# Patient Record
Sex: FEMALE | Race: WHITE | NOT HISPANIC OR LATINO | Employment: OTHER | URBAN - METROPOLITAN AREA
[De-identification: names, ages, dates, MRNs, and addresses within clinical notes are randomized per-mention and may not be internally consistent; named-entity substitution may affect disease eponyms.]

---

## 2017-03-09 ENCOUNTER — OFFICE VISIT (OUTPATIENT)
Dept: FAMILY MEDICINE | Facility: OTHER | Age: 32
End: 2017-03-09
Attending: FAMILY MEDICINE
Payer: COMMERCIAL

## 2017-03-09 VITALS
BODY MASS INDEX: 27.31 KG/M2 | HEART RATE: 76 BPM | WEIGHT: 160 LBS | SYSTOLIC BLOOD PRESSURE: 100 MMHG | DIASTOLIC BLOOD PRESSURE: 62 MMHG | HEIGHT: 64 IN | TEMPERATURE: 97.1 F | RESPIRATION RATE: 14 BRPM

## 2017-03-09 DIAGNOSIS — M62.830 SPASM OF LUMBAR PARASPINOUS MUSCLE: Primary | ICD-10-CM

## 2017-03-09 PROCEDURE — 99214 OFFICE O/P EST MOD 30 MIN: CPT | Performed by: FAMILY MEDICINE

## 2017-03-09 RX ORDER — TIZANIDINE 2 MG/1
2 TABLET ORAL 3 TIMES DAILY PRN
Qty: 30 TABLET | Refills: 0 | Status: SHIPPED | OUTPATIENT
Start: 2017-03-09 | End: 2017-04-13

## 2017-03-09 NOTE — MR AVS SNAPSHOT
"              After Visit Summary   3/9/2017    Rachel Villarreal    MRN: [id number]           Patient Information     Date Of Birth          1985        Visit Information        Provider Department      3/9/2017 2:45 PM Mary Rankin MD [facility]        Today's Diagnoses     Spasm of lumbar paraspinous muscle    -  1       Follow-ups after your visit        Who to contact     If you have questions or need follow up information about today's clinic visit or your schedule please contact [facility] directly at [phone].  Normal or non-critical lab and imaging results will be communicated to you by SeroMatchhart, letter or phone within 4 business days after the clinic has received the results. If you do not hear from us within 7 days, please contact the clinic through UpTapt or phone. If you have a critical or abnormal lab result, we will notify you by phone as soon as possible.  Submit refill requests through YesGraph or call your pharmacy and they will forward the refill request to us. Please allow 3 business days for your refill to be completed.          Additional Information About Your Visit        MyChart Information     YesGraph lets you send messages to your doctor, view your test results, renew your prescriptions, schedule appointments and more. To sign up, go to [email or web address] . Click on \"Log in\" on the left side of the screen, which will take you to the Welcome page. Then click on \"Sign up Now\" on the right side of the page.     You will be asked to enter the access code listed below, as well as some personal information. Please follow the directions to create your username and password.     Your access code is: [email or web address]  Expires: 2017  7:52 AM     Your access code will  in 90 days. If you need help or a new code, please call your [facility] or [phone].        Care EveryWhere ID     This is your Care EveryWhere ID. This could be used by " "other organizations to access your [place] medical records  [id number]        Your Vitals Were     Pulse Temperature Respirations Height BMI (Body Mass Index)       76 97.1  F (36.2  C) (Tympanic) 14 5' 3.5\" (1.613 m) 27.9 kg/m2        Blood Pressure from Last 3 Encounters:   03/09/17 100/62   09/08/16 110/80    Weight from Last 3 Encounters:   03/09/17 160 lb (72.6 kg)   09/08/16 155 lb (70.3 kg)              Today, you had the following     No orders found for display         Today's Medication Changes          These changes are accurate as of: 3/9/17 11:59 PM.  If you have any questions, ask your nurse or doctor.               Start taking these medicines.        Dose/Directions    tiZANidine 2 MG tablet   Commonly known as:  ZANAFLEX   Used for:  Spasm of lumbar paraspinous muscle   Started by:  Mary Rankin MD        Dose:  2 mg   Take 1 tablet (2 mg) by mouth 3 times daily as needed for muscle spasms   Quantity:  30 tablet   Refills:  0            Where to get your medicines      These medications were sent to [facility] [address] [address]  AT [facility] [address]  [address] [facility] [address]     Phone:  [phone]     tiZANidine 2 MG tablet                Primary Care Provider    None Specified       No primary provider on file.        Thank you!     Thank you for choosing [facility]  for your care. Our goal is always to provide you with excellent care. Hearing back from our patients is one way we can continue to improve our services. Please take a few minutes to complete the written survey that you may receive in the mail after your visit with us. Thank you!             Your Updated Medication List - Protect others around you: Learn how to safely use, store and throw away your medicines at www.disposemymeds.org.          This list is accurate as of: 3/9/17 11:59 PM.  Always use your most recent med list.                   Brand " Name Dispense Instructions for use    sertraline 50 MG tablet    ZOLOFT    90 tablet    Take 1 tablet (50 mg) by mouth daily       tiZANidine 2 MG tablet    ZANAFLEX    30 tablet    Take 1 tablet (2 mg) by mouth 3 times daily as needed for muscle spasms       valACYclovir 500 MG tablet    VALTREX     Take 500 mg by mouth daily as needed

## 2017-03-09 NOTE — NURSING NOTE
"Chief Complaint   Patient presents with     Back Pain     Patient reports low back pain x1 week.       Initial /62 (BP Location: Right arm, Patient Position: Chair, Cuff Size: Adult Regular)  Pulse 76  Temp 97.1  F (36.2  C) (Tympanic)  Resp 14  Ht 5' 3.5\" (1.613 m)  Wt 160 lb (72.6 kg)  BMI 27.9 kg/m2 Estimated body mass index is 27.9 kg/(m^2) as calculated from the following:    Height as of this encounter: 5' 3.5\" (1.613 m).    Weight as of this encounter: 160 lb (72.6 kg).  Medication Reconciliation: complete   Analilia Cobos      "

## 2017-03-10 ASSESSMENT — PATIENT HEALTH QUESTIONNAIRE - PHQ9: SUM OF ALL RESPONSES TO PHQ QUESTIONS 1-9: 0

## 2017-03-10 NOTE — PROGRESS NOTES
SUBJECTIVE:                                                    Rachel Villarreal is a 31 year old female who presents to clinic today for the following health issues:    Back Pain      Duration: about one week        Specific cause: none    Description:   Location of pain: low back left  Character of pain: gnawing, cramping and intermittently will get sharp  Pain radiation:none  New numbness or weakness in legs, not attributed to pain:  no     Intensity: moderate    History:   Pain interferes with job: No  History of back problems: no prior back problems  Any previous MRI or X-rays: None  Sees a specialist for back pain:  No    Alleviating factors:   Improved by: NSAIDs and stretch      Precipitating factors:  Worsened by: sitting on couch, then getting up    Functional and Psychosocial Screen (Terese STarT Back):      Not performed today       Accompanying Signs & Symptoms:  Risk of Fracture:  None  Risk of Cauda Equina:  None  Risk of Infection:  None  Risk of Cancer:  None  Risk of Ankylosing Spondylitis:  Onset at age <35, male, AND morning back stiffness. no                        Problem list and histories reviewed & adjusted, as indicated.  Additional history: as documented    Patient Active Problem List   Diagnosis     ACP (advance care planning)     Mild episode of recurrent major depressive disorder (H)     Advance care planning     Past Surgical History   Procedure Laterality Date     Ent surgery  2001     TONSILECTOMY     Gyn surgery  2011     invetro fertilization       Social History   Substance Use Topics     Smoking status: Never Smoker     Smokeless tobacco: Never Used     Alcohol use 0.0 oz/week     0 Standard drinks or equivalent per week      Comment: OCCASIONAL     Family History   Problem Relation Age of Onset     Hypertension Mother      Other Cancer Maternal Grandfather      DIABETES Paternal Grandfather          Current Outpatient Prescriptions   Medication Sig Dispense Refill      "tiZANidine (ZANAFLEX) 2 MG tablet Take 1 tablet (2 mg) by mouth 3 times daily as needed for muscle spasms 30 tablet 0     sertraline (ZOLOFT) 50 MG tablet Take 1 tablet (50 mg) by mouth daily 90 tablet 1     valACYclovir (VALTREX) 500 MG tablet Take 500 mg by mouth daily as needed       Allergies   Allergen Reactions     Penicillins Rash       Reviewed and updated as needed this visit by clinical staff  Tobacco  Allergies  Meds       Reviewed and updated as needed this visit by Provider         ROS:  Constitutional, HEENT, cardiovascular, pulmonary, gi and gu systems are negative, except as otherwise noted.    OBJECTIVE:                                                    /62 (BP Location: Right arm, Patient Position: Chair, Cuff Size: Adult Regular)  Pulse 76  Temp 97.1  F (36.2  C) (Tympanic)  Resp 14  Ht 5' 3.5\" (1.613 m)  Wt 160 lb (72.6 kg)  BMI 27.9 kg/m2  Body mass index is 27.9 kg/(m^2).  GENERAL: healthy, alert and no distress  BACK: no CVA tenderness, paralumbar tenderness present on the left with spasm, no tenderness along the sciatic groove area, straight leg raise negative bilaterally, reflexes 2+ bilaterally and symmetric at the patella  PSYCH: mentation appears normal, affect normal/bright    Diagnostic Test Results:  none      ASSESSMENT/PLAN:                                                      1. Spasm of lumbar paraspinous muscle  Continue NSAIDs.  Use Zanaflex at night.  Consider PT referral if no improvement noted.  - tiZANidine (ZANAFLEX) 2 MG tablet; Take 1 tablet (2 mg) by mouth 3 times daily as needed for muscle spasms  Dispense: 30 tablet; Refill: 0    Over 25 minutes are spent with the patient, over 50% of which was in education and counseling regarding current conditions and treatment/therapy options/risks/benefits/etc.      Mary Rankin MD  [facility]    "

## 2017-04-13 ENCOUNTER — OFFICE VISIT (OUTPATIENT)
Dept: FAMILY MEDICINE | Facility: OTHER | Age: 32
End: 2017-04-13
Attending: FAMILY MEDICINE
Payer: COMMERCIAL

## 2017-04-13 VITALS
HEART RATE: 80 BPM | BODY MASS INDEX: 27.49 KG/M2 | SYSTOLIC BLOOD PRESSURE: 110 MMHG | HEIGHT: 64 IN | WEIGHT: 161 LBS | RESPIRATION RATE: 14 BRPM | DIASTOLIC BLOOD PRESSURE: 70 MMHG

## 2017-04-13 DIAGNOSIS — N92.6 MISSED PERIODS: Primary | ICD-10-CM

## 2017-04-13 DIAGNOSIS — Z32.01 PREGNANCY TEST POSITIVE: ICD-10-CM

## 2017-04-13 LAB — HCG UR QL: POSITIVE

## 2017-04-13 PROCEDURE — 99213 OFFICE O/P EST LOW 20 MIN: CPT | Performed by: FAMILY MEDICINE

## 2017-04-13 PROCEDURE — 81025 URINE PREGNANCY TEST: CPT | Performed by: FAMILY MEDICINE

## 2017-04-13 NOTE — NURSING NOTE
"Chief Complaint   Patient presents with     Confirmation Of Pregnancy     LMP 2-26-17       Initial /70 (BP Location: Left arm, Patient Position: Chair, Cuff Size: Adult Regular)  Pulse 80  Resp 14  Ht 5' 3.5\" (1.613 m)  Wt 161 lb (73 kg)  LMP 02/26/2017  BMI 28.07 kg/m2 Estimated body mass index is 28.07 kg/(m^2) as calculated from the following:    Height as of this encounter: 5' 3.5\" (1.613 m).    Weight as of this encounter: 161 lb (73 kg).  Medication Reconciliation: complete     Skylar Rocha      "

## 2017-04-13 NOTE — MR AVS SNAPSHOT
"              After Visit Summary   4/13/2017    Rachel Villarreal    MRN: [id number]           Patient Information     Date Of Birth          1985        Visit Information        Provider Department      4/13/2017 2:30 PM Mary Rankin MD [facility]        Today's Diagnoses     Missed periods    -  1    Pregnancy test positive           Follow-ups after your visit        Follow-up notes from your care team     Return in about 4 weeks (around 5/11/2017).      Your next 10 appointments already scheduled     May 11, 2017  2:00 PM CDT   (Arrive by 1:45 PM)   New Prenatal with Mary Rankin MD   [facility] ([facility] )    [address] [facility] [address]   [phone]              Who to contact     If you have questions or need follow up information about today's clinic visit or your schedule please contact [facility] directly at [phone].  Normal or non-critical lab and imaging results will be communicated to you by MyChart, letter or phone within 4 business days after the clinic has received the results. If you do not hear from us within 7 days, please contact the clinic through Ultimate Football Networkhart or phone. If you have a critical or abnormal lab result, we will notify you by phone as soon as possible.  Submit refill requests through Split or call your pharmacy and they will forward the refill request to us. Please allow 3 business days for your refill to be completed.          Additional Information About Your Visit        Ultimate Football NetworkharCareShare Information     Split lets you send messages to your doctor, view your test results, renew your prescriptions, schedule appointments and more. To sign up, go to [email or web address] . Click on \"Log in\" on the left side of the screen, which will take you to the Welcome page. Then click on \"Sign up Now\" on the right side of the page.     You will be asked to enter the access code listed below, as well as " "some personal information. Please follow the directions to create your username and password.     Your access code is: [email or web address]  Expires: 2017  8:52 AM     Your access code will  in 90 days. If you need help or a new code, please call your [place] clinic or [phone].        Care EveryWhere ID     This is your Care EveryWhere ID. This could be used by other organizations to access your [place] medical records  [id number]        Your Vitals Were     Pulse Respirations Height Last Period BMI (Body Mass Index)       80 14 5' 3.5\" (1.613 m) 2017 28.07 kg/m2        Blood Pressure from Last 3 Encounters:   17 110/70   17 100/62   16 110/80    Weight from Last 3 Encounters:   17 161 lb (73 kg)   17 160 lb (72.6 kg)   16 155 lb (70.3 kg)              We Performed the Following     HCG qualitative urine          Today's Medication Changes          These changes are accurate as of: 17 11:59 PM.  If you have any questions, ask your nurse or doctor.               Stop taking these medicines if you haven't already. Please contact your care team if you have questions.     tiZANidine 2 MG tablet   Commonly known as:  ZANAFLEX   Stopped by:  Mary Rankin MD                    Primary Care Provider Office Phone # Fax #    Mary Rankin [phone]       [address] [address]        Thank you!     Thank you for choosing [facility]  for your care. Our goal is always to provide you with excellent care. Hearing back from our patients is one way we can continue to improve our services. Please take a few minutes to complete the written survey that you may receive in the mail after your visit with us. Thank you!             Your Updated Medication List - Protect others around you: Learn how to safely use, store and throw away your medicines at www.disposemymeds.org.          This " list is accurate as of: 4/13/17 11:59 PM.  Always use your most recent med list.                   Brand Name Dispense Instructions for use    PRENATAL VITAMINS PO          sertraline 50 MG tablet    ZOLOFT    90 tablet    Take 1 tablet (50 mg) by mouth daily       valACYclovir 500 MG tablet    VALTREX     Take 500 mg by mouth daily as needed

## 2017-04-14 NOTE — PROGRESS NOTES
Subjective:  Rachel Villarreal is a 31 year old female   Chief Complaint   Patient presents with     Confirmation Of Pregnancy     LMP 2-26-17       Patient presents today for confirmation of pregnancy.  LMP was 2/26, pregnancy was not being prevented.  This would be patient's first pregnancy.    Medical, surgical, social, and family histories, medications and allergies reviewed and updated.    Review Of Systems  10 point ROS neg other than the symptoms noted above in the HPI.      Objective:  B/P: 110/70, T: Data Unavailable, P: 80, R: 14  Constitutional: healthy, alert and no distress  Psychiatric: mentation appears normal and affect normal/bright    Results for orders placed or performed in visit on 04/13/17   HCG qualitative urine   Result Value Ref Range    HCG Qual Urine Positive (A) NEG         ASSESSMENT / PLAN:  (N92.6) Missed periods  (primary encounter diagnosis)  Comment:   Plan: HCG qualitative urine            (Z32.01) Pregnancy test positive  Comment:   Plan: With LMP of 2/26, EDC would be 12/2/17, and current gestation would be 6 weeks 3 days.  First OB visit in 4-5 weeks.  Signs of miscarriage discussed.  Follow-up sooner as needed.        Mary Sim MD

## 2017-04-18 ENCOUNTER — TELEPHONE (OUTPATIENT)
Dept: FAMILY MEDICINE | Facility: OTHER | Age: 32
End: 2017-04-18

## 2017-04-18 DIAGNOSIS — F33.0 MILD EPISODE OF RECURRENT MAJOR DEPRESSIVE DISORDER (H): Primary | ICD-10-CM

## 2017-04-18 NOTE — TELEPHONE ENCOUNTER
Called her back and she has been on sertraline for abut 3 years and has a very low sex drive and feels this is important and does not want to feel this way for her entire pregnancy

## 2017-04-18 NOTE — TELEPHONE ENCOUNTER
SEEN recently to confirm pregnancy and calling to request an antidepressant change from sertraline to wellbutrin if that is the one you discussed previously

## 2017-04-19 RX ORDER — BUPROPION HYDROCHLORIDE 150 MG/1
150 TABLET ORAL EVERY MORNING
Qty: 30 TABLET | Refills: 1 | Status: SHIPPED | OUTPATIENT
Start: 2017-04-19 | End: 2017-07-10

## 2017-04-19 NOTE — TELEPHONE ENCOUNTER
Start Wellbutrin 150 mg daily, sent to pharmacy.  Should wean off Zoloft, take 1/2 tablet daily for one week then stop.  Can take both medication together, wean off Zoloft while starting Wellbutrin.

## 2017-05-11 ENCOUNTER — PRENATAL OFFICE VISIT (OUTPATIENT)
Dept: FAMILY MEDICINE | Facility: OTHER | Age: 32
End: 2017-05-11
Attending: FAMILY MEDICINE
Payer: COMMERCIAL

## 2017-05-11 VITALS
BODY MASS INDEX: 28.53 KG/M2 | WEIGHT: 161 LBS | RESPIRATION RATE: 14 BRPM | HEIGHT: 63 IN | DIASTOLIC BLOOD PRESSURE: 64 MMHG | HEART RATE: 88 BPM | SYSTOLIC BLOOD PRESSURE: 110 MMHG | TEMPERATURE: 98.2 F

## 2017-05-11 DIAGNOSIS — Z34.81 ENCOUNTER FOR SUPERVISION OF OTHER NORMAL PREGNANCY, FIRST TRIMESTER: Primary | ICD-10-CM

## 2017-05-11 DIAGNOSIS — A60.00 HERPES SIMPLEX INFECTION OF GENITOURINARY SYSTEM: ICD-10-CM

## 2017-05-11 PROBLEM — Z34.80 SUPERVISION OF OTHER NORMAL PREGNANCY, ANTEPARTUM: Status: ACTIVE | Noted: 2017-05-11

## 2017-05-11 LAB
ALBUMIN UR-MCNC: NEGATIVE MG/DL
AMORPH CRY #/AREA URNS HPF: ABNORMAL /HPF
APPEARANCE UR: CLEAR
BACTERIA #/AREA URNS HPF: ABNORMAL /HPF
BILIRUB UR QL STRIP: NEGATIVE
COLOR UR AUTO: YELLOW
ERYTHROCYTE [DISTWIDTH] IN BLOOD BY AUTOMATED COUNT: 12.4 % (ref 10–15)
GLUCOSE UR STRIP-MCNC: NEGATIVE MG/DL
HCT VFR BLD AUTO: 33.6 % (ref 35–47)
HGB BLD-MCNC: 11.6 G/DL (ref 11.7–15.7)
HGB UR QL STRIP: ABNORMAL
KETONES UR STRIP-MCNC: NEGATIVE MG/DL
LEUKOCYTE ESTERASE UR QL STRIP: ABNORMAL
MCH RBC QN AUTO: 30 PG (ref 26.5–33)
MCHC RBC AUTO-ENTMCNC: 34.5 G/DL (ref 31.5–36.5)
MCV RBC AUTO: 87 FL (ref 78–100)
NITRATE UR QL: NEGATIVE
NON-SQ EPI CELLS #/AREA URNS LPF: ABNORMAL /LPF
PH UR STRIP: 7 PH (ref 5–7)
PLATELET # BLD AUTO: 229 10E9/L (ref 150–450)
RBC # BLD AUTO: 3.87 10E12/L (ref 3.8–5.2)
RBC #/AREA URNS AUTO: ABNORMAL /HPF (ref 0–2)
SP GR UR STRIP: 1.02 (ref 1–1.03)
URN SPEC COLLECT METH UR: ABNORMAL
UROBILINOGEN UR STRIP-ACNC: 0.2 EU/DL (ref 0.2–1)
WBC # BLD AUTO: 9.1 10E9/L (ref 4–11)
WBC #/AREA URNS AUTO: ABNORMAL /HPF (ref 0–2)

## 2017-05-11 PROCEDURE — 81001 URINALYSIS AUTO W/SCOPE: CPT | Performed by: FAMILY MEDICINE

## 2017-05-11 PROCEDURE — 86900 BLOOD TYPING SEROLOGIC ABO: CPT | Performed by: FAMILY MEDICINE

## 2017-05-11 PROCEDURE — 86787 VARICELLA-ZOSTER ANTIBODY: CPT | Mod: 90 | Performed by: FAMILY MEDICINE

## 2017-05-11 PROCEDURE — 86780 TREPONEMA PALLIDUM: CPT | Mod: 90 | Performed by: FAMILY MEDICINE

## 2017-05-11 PROCEDURE — 99207 ZZC FIRST OB VISIT: CPT | Performed by: FAMILY MEDICINE

## 2017-05-11 PROCEDURE — 86762 RUBELLA ANTIBODY: CPT | Mod: 90 | Performed by: FAMILY MEDICINE

## 2017-05-11 PROCEDURE — 85027 COMPLETE CBC AUTOMATED: CPT | Performed by: FAMILY MEDICINE

## 2017-05-11 PROCEDURE — 86901 BLOOD TYPING SEROLOGIC RH(D): CPT | Performed by: FAMILY MEDICINE

## 2017-05-11 PROCEDURE — 86850 RBC ANTIBODY SCREEN: CPT | Performed by: FAMILY MEDICINE

## 2017-05-11 PROCEDURE — 36415 COLL VENOUS BLD VENIPUNCTURE: CPT | Performed by: FAMILY MEDICINE

## 2017-05-11 PROCEDURE — 87389 HIV-1 AG W/HIV-1&-2 AB AG IA: CPT | Mod: 90 | Performed by: FAMILY MEDICINE

## 2017-05-11 PROCEDURE — 87591 N.GONORRHOEAE DNA AMP PROB: CPT | Mod: 90 | Performed by: FAMILY MEDICINE

## 2017-05-11 PROCEDURE — 87491 CHLMYD TRACH DNA AMP PROBE: CPT | Mod: 90 | Performed by: FAMILY MEDICINE

## 2017-05-11 PROCEDURE — 87340 HEPATITIS B SURFACE AG IA: CPT | Mod: 90 | Performed by: FAMILY MEDICINE

## 2017-05-11 PROCEDURE — 99000 SPECIMEN HANDLING OFFICE-LAB: CPT | Performed by: FAMILY MEDICINE

## 2017-05-11 NOTE — NURSING NOTE
"Chief Complaint   Patient presents with     Prenatal Care     Patient is here for a new OB visit, reports nausea. Patient is 10 weeks, 4 days.       Initial /64 (BP Location: Left arm, Patient Position: Chair, Cuff Size: Adult Regular)  Pulse 88  Temp 98.2  F (36.8  C) (Tympanic)  Resp 14  Ht 5' 3\" (1.6 m)  Wt 161 lb (73 kg)  LMP 02/26/2017  Breastfeeding? No  BMI 28.52 kg/m2 Estimated body mass index is 28.52 kg/(m^2) as calculated from the following:    Height as of this encounter: 5' 3\" (1.6 m).    Weight as of this encounter: 161 lb (73 kg).  Medication Reconciliation: complete   Analilia Cobos      "

## 2017-05-11 NOTE — MR AVS SNAPSHOT
"              After Visit Summary   5/11/2017    Rachel Villarreal    MRN: [id number]           Patient Information     Date Of Birth          1985        Visit Information        Provider Department      5/11/2017 2:00 PM Mayr Rankin MD [facility]        Today's Diagnoses     Encounter for supervision of other normal pregnancy, first trimester    -  1    Herpes simplex infection of genitourinary system           Follow-ups after your visit        Follow-up notes from your care team     Return in about 4 weeks (around 6/8/2017).      Your next 10 appointments already scheduled     Jun 08, 2017  3:30 PM CDT   (Arrive by 3:15 PM)   ESTABLISHED PRENATAL with Mary Rankin MD   [facility] ([facility] )    [address]  [facility] [address]   [phone]              Who to contact     If you have questions or need follow up information about today's clinic visit or your schedule please contact [facility] directly at [phone].  Normal or non-critical lab and imaging results will be communicated to you by Mobile Possehart, letter or phone within 4 business days after the clinic has received the results. If you do not hear from us within 7 days, please contact the clinic through Contextorst or phone. If you have a critical or abnormal lab result, we will notify you by phone as soon as possible.  Submit refill requests through Forgotten [place] or call your pharmacy and they will forward the refill request to us. Please allow 3 business days for your refill to be completed.          Additional Information About Your Visit        Mobile Possehart Information     Forgotten [place] lets you send messages to your doctor, view your test results, renew your prescriptions, schedule appointments and more. To sign up, go to [email or web address] . Click on \"Log in\" on the left side of the screen, which will take you to the Welcome page. Then click on \"Sign up Now\" on the right side " "of the page.     You will be asked to enter the access code listed below, as well as some personal information. Please follow the directions to create your username and password.     Your access code is: [email or web address]  Expires: 2017  8:52 AM     Your access code will  in 90 days. If you need help or a new code, please call your [facility] or [phone].        Care EveryWhere ID     This is your Care EveryWhere ID. This could be used by other organizations to access your [place] medical records  [id number]        Your Vitals Were     Pulse Temperature Respirations Height Last Period Breastfeeding?    88 98.2  F (36.8  C) (Tympanic) 14 5' 3\" (1.6 m) 2017 No    BMI (Body Mass Index)                   28.52 kg/m2            Blood Pressure from Last 3 Encounters:   17 110/64   17 110/70   17 100/62    Weight from Last 3 Encounters:   17 161 lb (73 kg)   17 161 lb (73 kg)   17 160 lb (72.6 kg)              We Performed the Following     ABO/Rh type and screen     Anti Treponema     CBC with platelets     CHLAMYDIA TRACHOMATIS PCR     Hepatitis B surface antigen     HIV Antigen Antibody Combo     NEISSERIA GONORRHOEA PCR     Rubella Antibody IgG Quantitative     UA with Microscopic     Varicella Zoster Virus Antibody IgG        Primary Care Provider Office Phone # Fax #    Mary Rankin [phone]       [facility] [address] [place] [address]        Thank you!     Thank you for choosing [facility]  for your care. Our goal is always to provide you with excellent care. Hearing back from our patients is one way we can continue to improve our services. Please take a few minutes to complete the written survey that you may receive in the mail after your visit with us. Thank you!             Your Updated Medication List - Protect others around you: Learn how to safely use, store and throw away your " medicines at www.disposemymeds.org.          This list is accurate as of: 5/11/17  2:53 PM.  Always use your most recent med list.                   Brand Name Dispense Instructions for use    buPROPion 150 MG 24 hr tablet    WELLBUTRIN XL    30 tablet    Take 1 tablet (150 mg) by mouth every morning       PRENATAL VITAMINS PO      Reported on 5/11/2017       valACYclovir 500 MG tablet    VALTREX     Take 500 mg by mouth daily as needed Reported on 5/11/2017

## 2017-05-11 NOTE — PROGRESS NOTES
"SUBJECTIVE: Rachel Villarreal is an 31 year old female here for initial OB visit.    Past Medical History of Father of Baby: No significant medical history    Review of Systems:   Constitutional, HEENT, cardiovascular, pulmonary, gi and gu systems are negative, except as otherwise noted.     History Since Last Menstrual Period: mild nausea    EXAM: Blood pressure 110/64, pulse 88, temperature 98.2  F (36.8  C), temperature source Tympanic, resp. rate 14, height 5' 3\" (1.6 m), weight 161 lb (73 kg), last menstrual period 02/26/2017, not currently breastfeeding.  GENERAL APPEARANCE: healthy, alert and no distress  EYES: EOMI,  PERRL  RESP: lungs clear to auscultation - no rales, rhonchi or wheezes  CV: regular rates and rhythm, normal S1 S2, no S3 or S4 and no murmur, click or rub -  ABDOMEN: fundus just above symphysis, FHTs 160s  PSYCH: mentation appears normal and affect normal/bright    Pelvix exam:  Deferred, pelvis proven    ASSESSMENT/ PLAN:  Follow up in 4 weeks.  NFT testing discussed, she and  will discuss  Normal exercise.  Normal sexual activity.  Prenatal vitamins.  Anticipated weight gain.  Herpes prophylaxis starting at 34 weeks discussed.    Mary Rankin MD      "

## 2017-05-12 LAB
ABO + RH BLD: NORMAL
ABO + RH BLD: NORMAL
BLD GP AB SCN SERPL QL: NORMAL
BLOOD BANK CMNT PATIENT-IMP: NORMAL
SPECIMEN EXP DATE BLD: NORMAL

## 2017-05-12 ASSESSMENT — PATIENT HEALTH QUESTIONNAIRE - PHQ9: SUM OF ALL RESPONSES TO PHQ QUESTIONS 1-9: 4

## 2017-05-13 LAB — T PALLIDUM IGG+IGM SER QL: NEGATIVE

## 2017-05-14 LAB
C TRACH DNA SPEC QL NAA+PROBE: NORMAL
N GONORRHOEA DNA SPEC QL NAA+PROBE: NORMAL
SPECIMEN SOURCE: NORMAL
SPECIMEN SOURCE: NORMAL

## 2017-05-15 LAB
HBV SURFACE AG SERPL QL IA: NONREACTIVE
HIV 1+2 AB+HIV1 P24 AG SERPL QL IA: NORMAL
RUBV IGG SERPL IA-ACNC: 16 IU/ML
VZV IGG SER QL IA: 1.4 AI (ref 0–0.8)

## 2017-06-08 ENCOUNTER — APPOINTMENT (OUTPATIENT)
Dept: LAB | Facility: OTHER | Age: 32
End: 2017-06-08
Attending: FAMILY MEDICINE
Payer: COMMERCIAL

## 2017-06-08 ENCOUNTER — PRENATAL OFFICE VISIT (OUTPATIENT)
Dept: FAMILY MEDICINE | Facility: OTHER | Age: 32
End: 2017-06-08
Attending: FAMILY MEDICINE
Payer: COMMERCIAL

## 2017-06-08 VITALS
HEART RATE: 80 BPM | RESPIRATION RATE: 14 BRPM | SYSTOLIC BLOOD PRESSURE: 92 MMHG | WEIGHT: 161 LBS | DIASTOLIC BLOOD PRESSURE: 62 MMHG | HEIGHT: 63 IN | BODY MASS INDEX: 28.53 KG/M2

## 2017-06-08 DIAGNOSIS — Z34.82 ENCOUNTER FOR SUPERVISION OF OTHER NORMAL PREGNANCY IN SECOND TRIMESTER: Primary | ICD-10-CM

## 2017-06-08 PROCEDURE — 99207 ZZC PRENATAL VISIT: CPT | Performed by: FAMILY MEDICINE

## 2017-06-08 NOTE — MR AVS SNAPSHOT
After Visit Summary   6/8/2017    Rachel Villarreal    MRN: [id number]           Patient Information     Date Of Birth          1985        Visit Information        Provider Department      6/8/2017 3:30 PM Mary Rankin MD [facility]        Today's Diagnoses     Encounter for supervision of other normal pregnancy in second trimester    -  1       Follow-ups after your visit        Follow-up notes from your care team     Return in about 4 weeks (around 7/6/2017).      Your next 10 appointments already scheduled     Jul 10, 2017  3:30 PM CDT   LAB with [facility] ([facility] )    [address]  [facility] [address]   [phone]            Jul 10, 2017  3:45 PM CDT   (Arrive by 3:30 PM)   ESTABLISHED PRENATAL with Mary Rankin MD   [facility] ([facility] )    [address] [facility] [address]   [phone]              Future tests that were ordered for you today     Open Standing Orders        Priority Remaining Interval Expires Ordered    UA without Microscopic Routine 20/20 6/9/2018 6/9/2017            Who to contact     If you have questions or need follow up information about today's clinic visit or your schedule please contact [facility] directly at [phone].  Normal or non-critical lab and imaging results will be communicated to you by MyChart, letter or phone within 4 business days after the clinic has received the results. If you do not hear from us within 7 days, please contact the clinic through MyChart or phone. If you have a critical or abnormal lab result, we will notify you by phone as soon as possible.  Submit refill requests through [employer] or call your pharmacy and they will forward the refill request to us. Please allow 3 business days for your refill to be completed.          Additional Information About Your Visit        MyChart Information      "[employer] lets you send messages to your doctor, view your test results, renew your prescriptions, schedule appointments and more. To sign up, go to [email or web address] . Click on \"Log in\" on the left side of the screen, which will take you to the Welcome page. Then click on \"Sign up Now\" on the right side of the page.     You will be asked to enter the access code listed below, as well as some personal information. Please follow the directions to create your username and password.     Your access code is: [email or web address]  Expires: 2017  5:43 PM     Your access code will  in 90 days. If you need help or a new code, please call your [place] clinic or [phone].        Care EveryWhere ID     This is your Care EveryWhere ID. This could be used by other organizations to access your [place] medical records  [id number]        Your Vitals Were     Pulse Respirations Height Last Period BMI (Body Mass Index)       80 14 5' 3\" (1.6 m) 2017 28.52 kg/m2        Blood Pressure from Last 3 Encounters:   17 92/62   17 110/64   17 110/70    Weight from Last 3 Encounters:   17 161 lb (73 kg)   17 161 lb (73 kg)   17 161 lb (73 kg)               Primary Care Provider Office Phone # Fax #    Marykevin Rankin [phone]       [address] [address]        Thank you!     Thank you for choosing [facility]  for your care. Our goal is always to provide you with excellent care. Hearing back from our patients is one way we can continue to improve our services. Please take a few minutes to complete the written survey that you may receive in the mail after your visit with us. Thank you!             Your Updated Medication List - Protect others around you: Learn how to safely use, store and throw away your medicines at www.disposemymeds.org.          This list is accurate as of: 17 11:59 PM.  Always use " your most recent med list.                   Brand Name Dispense Instructions for use    buPROPion 150 MG 24 hr tablet    WELLBUTRIN XL    30 tablet    Take 1 tablet (150 mg) by mouth every morning       PRENATAL VITAMINS PO      Reported on 5/11/2017       valACYclovir 500 MG tablet    VALTREX     Take 500 mg by mouth daily as needed Reported on 5/11/2017

## 2017-06-08 NOTE — NURSING NOTE
"Chief Complaint   Patient presents with     Prenatal Care     14 weeks,       Initial BP 92/62 (BP Location: Left arm, Patient Position: Chair, Cuff Size: Adult Regular)  Pulse 80  Resp 14  Ht 5' 3\" (1.6 m)  Wt 161 lb (73 kg)  LMP 02/26/2017  BMI 28.52 kg/m2 Estimated body mass index is 28.52 kg/(m^2) as calculated from the following:    Height as of this encounter: 5' 3\" (1.6 m).    Weight as of this encounter: 161 lb (73 kg).  Medication Reconciliation: complete       Skylar Rocha      "

## 2017-06-09 NOTE — PROGRESS NOTES
"S:  Doing well, no cramping or contractions, no bleeding or spotting, good fetal movement    O:  BP 92/62 (BP Location: Left arm, Patient Position: Chair, Cuff Size: Adult Regular)  Pulse 80  Resp 14  Ht 5' 3\" (1.6 m)  Wt 161 lb (73 kg)  LMP 02/26/2017  BMI 28.52 kg/m2  Abd: fundus at 14  FHTs 150s    ASSESSMENT / PLAN:  (Z34.82) Encounter for supervision of other normal pregnancy in second trimester  (primary encounter diagnosis)  Comment:   Plan: Quad screen discussed, patient will consider but likely will decline.  Follow-up in four weeks, sooner as needed.        Mary Rankin MD      "

## 2017-06-29 DIAGNOSIS — F33.0 MILD EPISODE OF RECURRENT MAJOR DEPRESSIVE DISORDER (H): ICD-10-CM

## 2017-06-30 NOTE — TELEPHONE ENCOUNTER
zoloft     Last Written Prescription Date: 10/27/2016- medication dc off med list   Last Fill Quantity: 90, # refills: 0  Last Office Visit with G primary care provider:  4/13/2017   Next 5 appointments (look out 90 days)     Jul 10, 2017  3:45 PM CDT   (Arrive by 3:30 PM)   ESTABLISHED PRENATAL with Mary Rankin MD   [facility] ([facility] )    [address]  [place] [address]   [phone]                   Last PHQ-9 score on record=   PHQ-9 SCORE 5/11/2017   Total Score 4

## 2017-06-30 NOTE — TELEPHONE ENCOUNTER
Zoloft not on current medication list.  Discontinued 4/19/17 with reason none.  Please see notes below.  Medication pended.  Thank you.

## 2017-07-10 ENCOUNTER — PRENATAL OFFICE VISIT (OUTPATIENT)
Dept: FAMILY MEDICINE | Facility: OTHER | Age: 32
End: 2017-07-10
Attending: FAMILY MEDICINE
Payer: COMMERCIAL

## 2017-07-10 VITALS — BODY MASS INDEX: 28.52 KG/M2 | WEIGHT: 161 LBS | SYSTOLIC BLOOD PRESSURE: 100 MMHG | DIASTOLIC BLOOD PRESSURE: 64 MMHG

## 2017-07-10 DIAGNOSIS — Z34.82 ENCOUNTER FOR SUPERVISION OF OTHER NORMAL PREGNANCY IN SECOND TRIMESTER: ICD-10-CM

## 2017-07-10 LAB
ALBUMIN UR-MCNC: NEGATIVE MG/DL
APPEARANCE UR: CLEAR
BILIRUB UR QL STRIP: NEGATIVE
COLOR UR AUTO: YELLOW
GLUCOSE UR STRIP-MCNC: NEGATIVE MG/DL
HGB UR QL STRIP: ABNORMAL
KETONES UR STRIP-MCNC: NEGATIVE MG/DL
LEUKOCYTE ESTERASE UR QL STRIP: NEGATIVE
NITRATE UR QL: NEGATIVE
PH UR STRIP: 6 PH (ref 5–7)
SP GR UR STRIP: 1.02 (ref 1–1.03)
URN SPEC COLLECT METH UR: ABNORMAL
UROBILINOGEN UR STRIP-ACNC: 0.2 EU/DL (ref 0.2–1)

## 2017-07-10 PROCEDURE — 99207 ZZC PRENATAL VISIT: CPT | Performed by: FAMILY MEDICINE

## 2017-07-10 ASSESSMENT — ANXIETY QUESTIONNAIRES
5. BEING SO RESTLESS THAT IT IS HARD TO SIT STILL: NOT AT ALL
GAD7 TOTAL SCORE: 0
2. NOT BEING ABLE TO STOP OR CONTROL WORRYING: NOT AT ALL
1. FEELING NERVOUS, ANXIOUS, OR ON EDGE: NOT AT ALL
7. FEELING AFRAID AS IF SOMETHING AWFUL MIGHT HAPPEN: NOT AT ALL
3. WORRYING TOO MUCH ABOUT DIFFERENT THINGS: NOT AT ALL
4. TROUBLE RELAXING: NOT AT ALL
6. BECOMING EASILY ANNOYED OR IRRITABLE: NOT AT ALL

## 2017-07-10 NOTE — MR AVS SNAPSHOT
After Visit Summary   7/10/2017    Rachel Villarreal    MRN: [id number]           Patient Information     Date Of Birth          1985        Visit Information        Provider Department      7/10/2017 3:45 PM Mary Rankin MD [facility]        Today's Diagnoses     Encounter for supervision of other normal pregnancy in second trimester           Follow-ups after your visit        Follow-up notes from your care team     Return in about 4 weeks (around 8/7/2017).      Your next 10 appointments already scheduled     Jul 17, 2017 12:30 PM CDT   Radiology with [facility]   [facility] ([facility] )    [address] [place] [address]   [phone]            Aug 11, 2017  2:00 PM CDT   LAB with [facility]   [facility] ([facility] )    [address] [place] [address]   [phone]            Aug 11, 2017  2:15 PM CDT   (Arrive by 2:00 PM)   ESTABLISHED PRENATAL with Mary Rankin MD   [facility] ([facility] )    [address]  [place] [address]   [phone]              Who to contact     If you have questions or need follow up information about today's clinic visit or your schedule please contact [facility] directly at [phone].  Normal or non-critical lab and imaging results will be communicated to you by MyChart, letter or phone within 4 business days after the clinic has received the results. If you do not hear from us within 7 days, please contact the clinic through MyChart or phone. If you have a critical or abnormal lab result, we will notify you by phone as soon as possible.  Submit refill requests through [employer] or call your pharmacy and they will forward the refill request to us. Please allow 3 business days for your refill to be completed.          Additional Information About Your Visit        MyChart Information   "   [employer] lets you send messages to your doctor, view your test results, renew your prescriptions, schedule appointments and more. To sign up, go to [email or web address] . Click on \"Log in\" on the left side of the screen, which will take you to the Welcome page. Then click on \"Sign up Now\" on the right side of the page.     You will be asked to enter the access code listed below, as well as some personal information. Please follow the directions to create your username and password.     Your access code is: [email or web address]  Expires: 2017  5:43 PM     Your access code will  in 90 days. If you need help or a new code, please call your [place] clinic or [phone].        Care EveryWhere ID     This is your Care EveryWhere ID. This could be used by other organizations to access your [place] medical records  [id number]        Your Vitals Were     Last Period BMI (Body Mass Index)                2017 28.52 kg/m2           Blood Pressure from Last 3 Encounters:   07/10/17 100/64   17 92/62   17 110/64    Weight from Last 3 Encounters:   07/10/17 161 lb (73 kg)   17 161 lb (73 kg)   17 161 lb (73 kg)                 Today's Medication Changes          These changes are accurate as of: 7/10/17 11:59 PM.  If you have any questions, ask your nurse or doctor.               These medicines have changed or have updated prescriptions.        Dose/Directions    sertraline 50 MG tablet   Commonly known as:  ZOLOFT   This may have changed:  See the new instructions.   Used for:  Mild episode of recurrent major depressive disorder (H)        TAKE 1 TABLET BY MOUTH EVERY DAY   Quantity:  90 tablet   Refills:  0         Stop taking these medicines if you haven't already. Please contact your care team if you have questions.     buPROPion 150 MG 24 hr tablet   Commonly known as:  WELLBUTRIN XL   Stopped by:  Mary Rankin MD                    Primary Care Provider Office Phone # Fax " #    Mary Rankin [phone]       [facility] [address] [facility] [address]        Equal Access to Services     FRANKIE HAGAN : Nabeel Santiago, avi mo, kayleneyashira kachancirilo marin, corrine mortensen haymaisha mcmahoncorazonbakari katz. So [facility] [phone].    ATENCIÓN: Si habla español, tiene a davis disposición servicios gratuitos de asistencia lingüística. Llame al [phone].    We comply with applicable federal civil rights laws and [place] laws. We do not discriminate on the basis of race, color, national origin, age, disability sex, sexual orientation or gender identity.            Thank you!     Thank you for choosing [facility]  for your care. Our goal is always to provide you with excellent care. Hearing back from our patients is one way we can continue to improve our services. Please take a few minutes to complete the written survey that you may receive in the mail after your visit with us. Thank you!             Your Updated Medication List - Protect others around you: Learn how to safely use, store and throw away your medicines at www.disposemymeds.org.          This list is accurate as of: 7/10/17 11:59 PM.  Always use your most recent med list.                   Brand Name Dispense Instructions for use Diagnosis    PRENATAL VITAMINS PO      Reported on 5/11/2017        sertraline 50 MG tablet    ZOLOFT    90 tablet    TAKE 1 TABLET BY MOUTH EVERY DAY    Mild episode of recurrent major depressive disorder (H)       valACYclovir 500 MG tablet    VALTREX     Take 500 mg by mouth daily as needed Reported on 5/11/2017

## 2017-07-10 NOTE — NURSING NOTE
"Chief Complaint   Patient presents with     Prenatal Care     19 weeks        Initial /64 (BP Location: Right arm, Patient Position: Sitting, Cuff Size: Adult Regular)  Wt 161 lb (73 kg)  LMP 02/26/2017  BMI 28.52 kg/m2 Estimated body mass index is 28.52 kg/(m^2) as calculated from the following:    Height as of 6/8/17: 5' 3\" (1.6 m).    Weight as of this encounter: 161 lb (73 kg).  Medication Reconciliation: anne Hutson      "

## 2017-07-11 ASSESSMENT — ANXIETY QUESTIONNAIRES: GAD7 TOTAL SCORE: 0

## 2017-07-11 ASSESSMENT — PATIENT HEALTH QUESTIONNAIRE - PHQ9: SUM OF ALL RESPONSES TO PHQ QUESTIONS 1-9: 0

## 2017-07-13 NOTE — PROGRESS NOTES
S:  Doing well, no cramping or contractions, no bleeding or spotting, good fetal movement    O:  /64 (BP Location: Right arm, Patient Position: Sitting, Cuff Size: Adult Regular)  Wt 161 lb (73 kg)  LMP 02/26/2017  BMI 28.52 kg/m2  Abd: fundus at 19 cm  FHTs 140s    ASSESSMENT / PLAN:  (Z34.82) Encounter for supervision of other normal pregnancy in second trimester  Comment:   Plan: US OB > 14 Weeks Complete Single        OB ultrasound ordered.  Follow-up in four weeks, 1 hr GTT at that time.        Mary Rankin MD

## 2017-07-17 ENCOUNTER — HOSPITAL ENCOUNTER (OUTPATIENT)
Dept: ULTRASOUND IMAGING | Facility: HOSPITAL | Age: 32
Discharge: HOME | End: 2017-07-17
Attending: FAMILY MEDICINE | Admitting: FAMILY MEDICINE
Payer: COMMERCIAL

## 2017-07-17 PROCEDURE — 76805 OB US >/= 14 WKS SNGL FETUS: CPT | Mod: TC

## 2017-08-09 ENCOUNTER — PRENATAL OFFICE VISIT (OUTPATIENT)
Dept: FAMILY MEDICINE | Facility: OTHER | Age: 32
End: 2017-08-09
Attending: FAMILY MEDICINE
Payer: COMMERCIAL

## 2017-08-09 VITALS — DIASTOLIC BLOOD PRESSURE: 64 MMHG | SYSTOLIC BLOOD PRESSURE: 100 MMHG | BODY MASS INDEX: 29.76 KG/M2 | WEIGHT: 168 LBS

## 2017-08-09 DIAGNOSIS — Z34.82 ENCOUNTER FOR SUPERVISION OF OTHER NORMAL PREGNANCY IN SECOND TRIMESTER: ICD-10-CM

## 2017-08-09 LAB
ALBUMIN UR-MCNC: NEGATIVE MG/DL
APPEARANCE UR: CLEAR
BILIRUB UR QL STRIP: NEGATIVE
COLOR UR AUTO: YELLOW
GLUCOSE UR STRIP-MCNC: NEGATIVE MG/DL
HGB BLD-MCNC: 11.4 G/DL (ref 11.7–15.7)
HGB UR QL STRIP: ABNORMAL
KETONES UR STRIP-MCNC: NEGATIVE MG/DL
LEUKOCYTE ESTERASE UR QL STRIP: NEGATIVE
NITRATE UR QL: NEGATIVE
PH UR STRIP: 7 PH (ref 5–7)
SP GR UR STRIP: 1.02 (ref 1–1.03)
URN SPEC COLLECT METH UR: ABNORMAL
UROBILINOGEN UR STRIP-ACNC: 0.2 EU/DL (ref 0.2–1)

## 2017-08-09 PROCEDURE — 99207 ZZC PRENATAL VISIT: CPT | Performed by: FAMILY MEDICINE

## 2017-08-09 PROCEDURE — 36415 COLL VENOUS BLD VENIPUNCTURE: CPT | Performed by: FAMILY MEDICINE

## 2017-08-09 PROCEDURE — 85018 HEMOGLOBIN: CPT | Performed by: FAMILY MEDICINE

## 2017-08-09 PROCEDURE — 82950 GLUCOSE TEST: CPT | Performed by: FAMILY MEDICINE

## 2017-08-09 PROCEDURE — 86850 RBC ANTIBODY SCREEN: CPT | Performed by: FAMILY MEDICINE

## 2017-08-09 ASSESSMENT — PATIENT HEALTH QUESTIONNAIRE - PHQ9: SUM OF ALL RESPONSES TO PHQ QUESTIONS 1-9: 0

## 2017-08-09 ASSESSMENT — ANXIETY QUESTIONNAIRES
1. FEELING NERVOUS, ANXIOUS, OR ON EDGE: NOT AT ALL
3. WORRYING TOO MUCH ABOUT DIFFERENT THINGS: NOT AT ALL
GAD7 TOTAL SCORE: 0
6. BECOMING EASILY ANNOYED OR IRRITABLE: NOT AT ALL
7. FEELING AFRAID AS IF SOMETHING AWFUL MIGHT HAPPEN: NOT AT ALL
2. NOT BEING ABLE TO STOP OR CONTROL WORRYING: NOT AT ALL
5. BEING SO RESTLESS THAT IT IS HARD TO SIT STILL: NOT AT ALL
4. TROUBLE RELAXING: NOT AT ALL

## 2017-08-09 NOTE — NURSING NOTE
"Chief Complaint   Patient presents with     Prenatal Care     23 weeks and 3 days, doing glucose today       Initial /64 (BP Location: Left arm, Patient Position: Sitting, Cuff Size: Adult Regular)  Wt 168 lb (76.2 kg)  LMP 02/26/2017  BMI 29.76 kg/m2 Estimated body mass index is 29.76 kg/(m^2) as calculated from the following:    Height as of 6/8/17: 5' 3\" (1.6 m).    Weight as of this encounter: 168 lb (76.2 kg).  Medication Reconciliation: complete     Gayle Hutson      "

## 2017-08-09 NOTE — NURSING NOTE
Breastfeeding education provided:  - Cue Feeding  - Supply and Demand  - Exclusivity   - Good Latch  - Avoiding Artifical Nipples

## 2017-08-09 NOTE — PROGRESS NOTES
S:  Doing well, no cramping or contractions, no bleeding or spotting, good fetal movement    O:  /64 (BP Location: Left arm, Patient Position: Sitting, Cuff Size: Adult Regular)  Wt 168 lb (76.2 kg)  LMP 02/26/2017  BMI 29.76 kg/m2  Abd: fundus at 23 cm  FHTs 140s    ASSESSMENT / PLAN:  (Z34.82) Encounter for supervision of other normal pregnancy in second trimester  Comment:   Plan: Completing 1 hr GTT today.  Follow-up in four week, sooner as needed.  Baby Friendly reading completed.        Mary Rankin MD

## 2017-08-10 DIAGNOSIS — O99.810 ABNORMAL MATERNAL GLUCOSE TOLERANCE, ANTEPARTUM: Primary | ICD-10-CM

## 2017-08-10 LAB
BLD GP AB SCN SERPL QL: NORMAL
GLUCOSE 1H P 50 G GLC PO SERPL-MCNC: 145 MG/DL (ref 60–129)

## 2017-08-10 ASSESSMENT — ANXIETY QUESTIONNAIRES: GAD7 TOTAL SCORE: 0

## 2017-08-11 DIAGNOSIS — O99.810 ABNORMAL MATERNAL GLUCOSE TOLERANCE, ANTEPARTUM: ICD-10-CM

## 2017-08-11 LAB
GLUCOSE 1H P 100 G GLC PO SERPL-MCNC: 121 MG/DL (ref 60–179)
GLUCOSE 2H P 100 G GLC PO SERPL-MCNC: 93 MG/DL (ref 60–154)
GLUCOSE 3H P 100 G GLC PO SERPL-MCNC: 108 MG/DL (ref 60–139)
GLUCOSE P FAST SERPL-MCNC: 79 MG/DL (ref 60–94)

## 2017-08-11 PROCEDURE — 36415 COLL VENOUS BLD VENIPUNCTURE: CPT | Performed by: FAMILY MEDICINE

## 2017-08-11 PROCEDURE — 82951 GLUCOSE TOLERANCE TEST (GTT): CPT | Performed by: FAMILY MEDICINE

## 2017-08-11 PROCEDURE — 82952 GTT-ADDED SAMPLES: CPT | Performed by: FAMILY MEDICINE

## 2017-09-08 ENCOUNTER — PRENATAL OFFICE VISIT (OUTPATIENT)
Dept: FAMILY MEDICINE | Facility: OTHER | Age: 32
End: 2017-09-08
Attending: FAMILY MEDICINE
Payer: COMMERCIAL

## 2017-09-08 VITALS
SYSTOLIC BLOOD PRESSURE: 104 MMHG | DIASTOLIC BLOOD PRESSURE: 58 MMHG | WEIGHT: 173 LBS | BODY MASS INDEX: 30.65 KG/M2 | HEIGHT: 63 IN

## 2017-09-08 DIAGNOSIS — Z34.82 ENCOUNTER FOR SUPERVISION OF OTHER NORMAL PREGNANCY IN SECOND TRIMESTER: ICD-10-CM

## 2017-09-08 DIAGNOSIS — Z23 NEED FOR VACCINATION: ICD-10-CM

## 2017-09-08 DIAGNOSIS — Z34.83 ENCOUNTER FOR SUPERVISION OF OTHER NORMAL PREGNANCY IN THIRD TRIMESTER: Primary | ICD-10-CM

## 2017-09-08 LAB
ALBUMIN UR-MCNC: NEGATIVE MG/DL
APPEARANCE UR: CLEAR
BILIRUB UR QL STRIP: NEGATIVE
COLOR UR AUTO: YELLOW
GLUCOSE UR STRIP-MCNC: NEGATIVE MG/DL
HGB UR QL STRIP: ABNORMAL
KETONES UR STRIP-MCNC: NEGATIVE MG/DL
LEUKOCYTE ESTERASE UR QL STRIP: NEGATIVE
NITRATE UR QL: NEGATIVE
PH UR STRIP: 6 PH (ref 5–7)
SOURCE: ABNORMAL
SP GR UR STRIP: 1.02 (ref 1–1.03)
UROBILINOGEN UR STRIP-ACNC: 1 EU/DL (ref 0.2–1)

## 2017-09-08 PROCEDURE — 90471 IMMUNIZATION ADMIN: CPT | Performed by: FAMILY MEDICINE

## 2017-09-08 PROCEDURE — 99207 ZZC PRENATAL VISIT: CPT | Performed by: FAMILY MEDICINE

## 2017-09-08 PROCEDURE — 90715 TDAP VACCINE 7 YRS/> IM: CPT | Performed by: FAMILY MEDICINE

## 2017-09-08 ASSESSMENT — ANXIETY QUESTIONNAIRES
7. FEELING AFRAID AS IF SOMETHING AWFUL MIGHT HAPPEN: NOT AT ALL
2. NOT BEING ABLE TO STOP OR CONTROL WORRYING: NOT AT ALL
1. FEELING NERVOUS, ANXIOUS, OR ON EDGE: NOT AT ALL
3. WORRYING TOO MUCH ABOUT DIFFERENT THINGS: NOT AT ALL
IF YOU CHECKED OFF ANY PROBLEMS ON THIS QUESTIONNAIRE, HOW DIFFICULT HAVE THESE PROBLEMS MADE IT FOR YOU TO DO YOUR WORK, TAKE CARE OF THINGS AT HOME, OR GET ALONG WITH OTHER PEOPLE: NOT DIFFICULT AT ALL
6. BECOMING EASILY ANNOYED OR IRRITABLE: NOT AT ALL
5. BEING SO RESTLESS THAT IT IS HARD TO SIT STILL: NOT AT ALL
GAD7 TOTAL SCORE: 0

## 2017-09-08 ASSESSMENT — PATIENT HEALTH QUESTIONNAIRE - PHQ9
SUM OF ALL RESPONSES TO PHQ QUESTIONS 1-9: 0
5. POOR APPETITE OR OVEREATING: NOT AT ALL

## 2017-09-08 NOTE — MR AVS SNAPSHOT
After Visit Summary   9/8/2017    Rachel Villarreal    MRN: [id number]           Patient Information     Date Of Birth          1985        Visit Information        Provider Department      9/8/2017 2:00 PM Mary Rankin MD [facility]        Today's Diagnoses     Encounter for supervision of other normal pregnancy in third trimester    -  1    Need for vaccination           Follow-ups after your visit        Follow-up notes from your care team     Return in about 3 weeks (around 9/29/2017).      Your next 10 appointments already scheduled     Sep 29, 2017  3:45 PM CDT   LAB with [facility] ([facility] )    [address]  [facility] [address]   [phone]            Sep 29, 2017  4:00 PM CDT   (Arrive by 3:45 PM)   ESTABLISHED PRENATAL with Mary Rankin MD   [facility] ([facility] )    [address]  [facility] [address]   [phone]              Who to contact     If you have questions or need follow up information about today's clinic visit or your schedule please contact [facility] directly at [phone].  Normal or non-critical lab and imaging results will be communicated to you by MyChart, letter or phone within 4 business days after the clinic has received the results. If you do not hear from us within 7 days, please contact the clinic through MyChart or phone. If you have a critical or abnormal lab result, we will notify you by phone as soon as possible.  Submit refill requests through [employer] or call your pharmacy and they will forward the refill request to us. Please allow 3 business days for your refill to be completed.          Additional Information About Your Visit        MyChart Information     [employer] lets you send messages to your doctor, view your test results, renew your prescriptions, schedule appointments and more. To sign  "up, go to [email or web address] . Click on \"Log in\" on the left side of the screen, which will take you to the Welcome page. Then click on \"Sign up Now\" on the right side of the page.     You will be asked to enter the access code listed below, as well as some personal information. Please follow the directions to create your username and password.     Your access code is: [email or web address]  Expires: 2017  3:58 PM     Your access code will  in 90 days. If you need help or a new code, please call your [place] clinic or [phone].        Care EveryWhere ID     This is your Care EveryWhere ID. This could be used by other organizations to access your [place] medical records  [id number]        Your Vitals Were     Height Last Period BMI (Body Mass Index)             5' 3\" (1.6 m) 2017 30.65 kg/m2          Blood Pressure from Last 3 Encounters:   17 104/58   17 100/64   07/10/17 100/64    Weight from Last 3 Encounters:   17 173 lb (78.5 kg)   17 168 lb (76.2 kg)   07/10/17 161 lb (73 kg)              We Performed the Following     1st  Administration  [79371]     TDAP VACCINE (ADACEL) [44900.002]          Today's Medication Changes          These changes are accurate as of: 17  3:58 PM.  If you have any questions, ask your nurse or doctor.               These medicines have changed or have updated prescriptions.        Dose/Directions    sertraline 50 MG tablet   Commonly known as:  ZOLOFT   This may have changed:  See the new instructions.   Used for:  Mild episode of recurrent major depressive disorder (H)        TAKE 1 TABLET BY MOUTH EVERY DAY   Quantity:  90 tablet   Refills:  0                Primary Care Provider Office Phone # Fax #    Mary Rankin [phone] [address] [facility] [address]        Equal Access to Services     FRANKIE HAGAN AH: avi Mccord qaybta kaalmada adeegyada, waxay " balbina mcmahonvivi la'aan ah. So [facility] [phone].    ATENCIÓN: Si habla mariela, tiene a davis disposición servicios gratuitos de asistencia lingüística. Saray al [phone].    We comply with applicable federal civil rights laws and [place] laws. We do not discriminate on the basis of race, color, national origin, age, disability sex, sexual orientation or gender identity.            Thank you!     Thank you for choosing [facility]  for your care. Our goal is always to provide you with excellent care. Hearing back from our patients is one way we can continue to improve our services. Please take a few minutes to complete the written survey that you may receive in the mail after your visit with us. Thank you!             Your Updated Medication List - Protect others around you: Learn how to safely use, store and throw away your medicines at www.disposemymeds.org.          This list is accurate as of: 9/8/17  3:58 PM.  Always use your most recent med list.                   Brand Name Dispense Instructions for use Diagnosis    PRENATAL VITAMINS PO      Reported on 5/11/2017        sertraline 50 MG tablet    ZOLOFT    90 tablet    TAKE 1 TABLET BY MOUTH EVERY DAY    Mild episode of recurrent major depressive disorder (H)       valACYclovir 500 MG tablet    VALTREX     Take 500 mg by mouth daily as needed Reported on 5/11/2017

## 2017-09-08 NOTE — PROGRESS NOTES
"S:  Doing well, no cramping or contractions, no bleeding or spotting, good fetal movement    O:  /58  Ht 5' 3\" (1.6 m)  Wt 173 lb (78.5 kg)  LMP 02/26/2017  BMI 30.65 kg/m2  Abd: fundus at 26  FHTs 150s    ASSESSMENT / PLAN:  (Z34.83) Encounter for supervision of other normal pregnancy in third trimester  (primary encounter diagnosis)  Comment:   Plan: Tdap updated today.  Follow-up in about 3 weeks, sooner as needed.    (Z23) Need for vaccination  Comment:   Plan: TDAP VACCINE (ADACEL) [13159.002], 1st          Administration  [81820]                Mary Rankin MD      "

## 2017-09-08 NOTE — NURSING NOTE
"Chief Complaint   Patient presents with     Prenatal Care       Initial /58  Ht 5' 3\" (1.6 m)  Wt 173 lb (78.5 kg)  LMP 02/26/2017  BMI 30.65 kg/m2 Estimated body mass index is 30.65 kg/(m^2) as calculated from the following:    Height as of this encounter: 5' 3\" (1.6 m).    Weight as of this encounter: 173 lb (78.5 kg).  Medication Reconciliation: complete     KARI MCCARTY      "

## 2017-09-09 ASSESSMENT — ANXIETY QUESTIONNAIRES: GAD7 TOTAL SCORE: 0

## 2017-09-17 ENCOUNTER — HEALTH MAINTENANCE LETTER (OUTPATIENT)
Age: 32
End: 2017-09-17

## 2017-09-29 ENCOUNTER — PRENATAL OFFICE VISIT (OUTPATIENT)
Dept: FAMILY MEDICINE | Facility: OTHER | Age: 32
End: 2017-09-29
Attending: FAMILY MEDICINE
Payer: COMMERCIAL

## 2017-09-29 VITALS — WEIGHT: 176 LBS | BODY MASS INDEX: 31.18 KG/M2 | DIASTOLIC BLOOD PRESSURE: 58 MMHG | SYSTOLIC BLOOD PRESSURE: 102 MMHG

## 2017-09-29 DIAGNOSIS — Z34.82 ENCOUNTER FOR SUPERVISION OF OTHER NORMAL PREGNANCY IN SECOND TRIMESTER: ICD-10-CM

## 2017-09-29 DIAGNOSIS — Z34.83 ENCOUNTER FOR SUPERVISION OF OTHER NORMAL PREGNANCY IN THIRD TRIMESTER: Primary | ICD-10-CM

## 2017-09-29 DIAGNOSIS — Z23 NEED FOR PROPHYLACTIC VACCINATION AND INOCULATION AGAINST INFLUENZA: ICD-10-CM

## 2017-09-29 LAB
ALBUMIN UR-MCNC: NEGATIVE MG/DL
APPEARANCE UR: CLEAR
BILIRUB UR QL STRIP: NEGATIVE
COLOR UR AUTO: YELLOW
GLUCOSE UR STRIP-MCNC: NEGATIVE MG/DL
HGB UR QL STRIP: NEGATIVE
KETONES UR STRIP-MCNC: NEGATIVE MG/DL
LEUKOCYTE ESTERASE UR QL STRIP: ABNORMAL
NITRATE UR QL: NEGATIVE
PH UR STRIP: 6 PH (ref 5–7)
SOURCE: ABNORMAL
SP GR UR STRIP: <=1.005 (ref 1–1.03)
UROBILINOGEN UR STRIP-ACNC: 0.2 EU/DL (ref 0.2–1)

## 2017-09-29 PROCEDURE — 99207 ZZC PRENATAL VISIT: CPT | Performed by: FAMILY MEDICINE

## 2017-09-29 PROCEDURE — 90686 IIV4 VACC NO PRSV 0.5 ML IM: CPT | Performed by: FAMILY MEDICINE

## 2017-09-29 PROCEDURE — 90471 IMMUNIZATION ADMIN: CPT | Performed by: FAMILY MEDICINE

## 2017-09-29 ASSESSMENT — ANXIETY QUESTIONNAIRES
1. FEELING NERVOUS, ANXIOUS, OR ON EDGE: NOT AT ALL
7. FEELING AFRAID AS IF SOMETHING AWFUL MIGHT HAPPEN: NOT AT ALL
6. BECOMING EASILY ANNOYED OR IRRITABLE: NOT AT ALL
3. WORRYING TOO MUCH ABOUT DIFFERENT THINGS: NOT AT ALL
5. BEING SO RESTLESS THAT IT IS HARD TO SIT STILL: NOT AT ALL
2. NOT BEING ABLE TO STOP OR CONTROL WORRYING: NOT AT ALL
GAD7 TOTAL SCORE: 0
4. TROUBLE RELAXING: NOT AT ALL

## 2017-09-29 ASSESSMENT — PATIENT HEALTH QUESTIONNAIRE - PHQ9: SUM OF ALL RESPONSES TO PHQ QUESTIONS 1-9: 0

## 2017-09-29 NOTE — MR AVS SNAPSHOT
After Visit Summary   9/29/2017    Rachel Villarreal    MRN: [id number]           Patient Information     Date Of Birth          1985        Visit Information        Provider Department      9/29/2017 4:00 PM Mary Rankin MD [facility]        Today's Diagnoses     Encounter for supervision of other normal pregnancy in third trimester    -  1    Need for prophylactic vaccination and inoculation against influenza           Follow-ups after your visit        Follow-up notes from your care team     Return in about 2 weeks (around 10/13/2017).      Your next 10 appointments already scheduled     Oct 12, 2017  4:15 PM CDT   LAB with MT LAB   [facility] ([facility] )    [address]  [place] [address]   [phone]            Oct 12, 2017  4:30 PM CDT   (Arrive by 4:15 PM)   ESTABLISHED PRENATAL with Mary Rankin MD   [facility] ([facility] )    [address]  [place] [address]   [phone]            Oct 27, 2017  3:45 PM CDT   LAB with MT LAB   [facility] ([facility] )    [address]  [place] [address]   [phone]            Oct 27, 2017  4:00 PM CDT   (Arrive by 3:45 PM)   ESTABLISHED PRENATAL with Mary Rankin MD   [facility] ([facility] )    [address]  [place] [address]   [phone]            Nov 10, 2017  3:45 PM CST   LAB with MT LAB   [facility] ([facility] )    [address]  [place] [address]   [phone]            Nov 10, 2017  4:00 PM CST   (Arrive by 3:45 PM)   ESTABLISHED PRENATAL with Mary Rankin MD   [facility] ([facility] )    [address]  [place] [address]   [phone]            Nov 17, 2017  3:45 PM  "CST   LAB with MT LAB   [facility] ([facility] )    [address]  [place] [address]   [phone]            Nov 17, 2017  4:00 PM CST   (Arrive by 3:45 PM)   ESTABLISHED PRENATAL with Mary Rankin MD   [facility] ([facility] )    [address]  [place] [address]   [phone]            Nov 21, 2017  3:45 PM CST   LAB with MT LAB   [facility] ([facility] )    [address]  [place] [address]   [phone]            Nov 21, 2017  4:00 PM CST   (Arrive by 3:45 PM)   ESTABLISHED PRENATAL with Mary Rankin MD   [facility] ([facility] )    [address] [place] [address]   [phone]              Who to contact     If you have questions or need follow up information about today's clinic visit or your schedule please contact [facility] directly at [phone].  Normal or non-critical lab and imaging results will be communicated to you by MyChart, letter or phone within 4 business days after the clinic has received the results. If you do not hear from us within 7 days, please contact the clinic through BoB Partnershart or phone. If you have a critical or abnormal lab result, we will notify you by phone as soon as possible.  Submit refill requests through Right90 or call your pharmacy and they will forward the refill request to us. Please allow 3 business days for your refill to be completed.          Additional Information About Your Visit        MyChart Information     [email or web address] lets you send messages to your doctor, view your test results, renew your prescriptions, schedule appointments and more. To sign up, go to [email or web address] . Click on \"Log in\" on the left side of the screen, which will take you to the Welcome page. Then click on \"Sign up Now\" on the right side of the " page.     You will be asked to enter the access code listed below, as well as some personal information. Please follow the directions to create your username and password.     Your access code is: [email or web address]  Expires: 2017  3:58 PM     Your access code will  in 90 days. If you need help or a new code, please call your [place] clinic or [phone].        Care EveryWhere ID     This is your Care EveryWhere ID. This could be used by other organizations to access your [place] medical records  [id number]        Your Vitals Were     Last Period BMI (Body Mass Index)                2017 31.18 kg/m2           Blood Pressure from Last 3 Encounters:   17 102/58   17 104/58   17 100/64    Weight from Last 3 Encounters:   17 176 lb (79.8 kg)   17 173 lb (78.5 kg)   17 168 lb (76.2 kg)              We Performed the Following     FLU VAC, SPLIT VIRUS IM > 3 YO (QUADRIVALENT) [38705]     Vaccine Administration, Initial [96866]          Today's Medication Changes          These changes are accurate as of: 17  4:24 PM.  If you have any questions, ask your nurse or doctor.               These medicines have changed or have updated prescriptions.        Dose/Directions    sertraline 50 MG tablet   Commonly known as:  ZOLOFT   This may have changed:  See the new instructions.   Used for:  Mild episode of recurrent major depressive disorder (H)        TAKE 1 TABLET BY MOUTH EVERY DAY   Quantity:  90 tablet   Refills:  0                Primary Care Provider Office Phone # Fax #    Mary Rankin [phone] [address] [facility] [address]        Equal Access to Services     MARIE HAGAN : Nabeel Santiago, avi mo, stew linkalmacorrine musa. So [facility] [phone].    ATENCIÓN: Si habla español, tiene a davis disposición servicios gratuitos de asistencia lingüística.  Saray sanabria [phone].    We comply with applicable federal civil rights laws and [place] laws. We do not discriminate on the basis of race, color, national origin, age, disability, sex, sexual orientation, or gender identity.            Thank you!     Thank you for choosing [facility]  for your care. Our goal is always to provide you with excellent care. Hearing back from our patients is one way we can continue to improve our services. Please take a few minutes to complete the written survey that you may receive in the mail after your visit with us. Thank you!             Your Updated Medication List - Protect others around you: Learn how to safely use, store and throw away your medicines at www.disposemymeds.org.          This list is accurate as of: 9/29/17  4:24 PM.  Always use your most recent med list.                   Brand Name Dispense Instructions for use Diagnosis    PRENATAL VITAMINS PO      Reported on 5/11/2017        sertraline 50 MG tablet    ZOLOFT    90 tablet    TAKE 1 TABLET BY MOUTH EVERY DAY    Mild episode of recurrent major depressive disorder (H)       valACYclovir 500 MG tablet    VALTREX     Take 500 mg by mouth daily as needed Reported on 5/11/2017

## 2017-09-29 NOTE — PROGRESS NOTES
Injectable Influenza Immunization Documentation    1.  Is the person to be vaccinated sick today?   No    2. Does the person to be vaccinated have an allergy to a component   of the vaccine?   No    3. Has the person to be vaccinated ever had a serious reaction   to influenza vaccine in the past?   No    4. Has the person to be vaccinated ever had Guillain-Barré syndrome?   No    Form completed by Gayle Hutson

## 2017-09-29 NOTE — PROGRESS NOTES
S:  Doing well, no cramping or contractions, no bleeding or spotting, good fetal movement    O:  /58  Wt 176 lb (79.8 kg)  LMP 02/26/2017  BMI 31.18 kg/m2  Abd: gravid  FHTs 140-150s    ASSESSMENT / PLAN:  (Z34.83) Encounter for supervision of other normal pregnancy in third trimester  (primary encounter diagnosis)  Comment:   Plan: Follow-up in 2 weeks, sooner as needed.    (Z23) Need for prophylactic vaccination and inoculation against influenza  Comment:   Plan: FLU VAC, SPLIT VIRUS IM > 3 YO (QUADRIVALENT)         [33114], Vaccine Administration, Initial         [00003]        Updated.        Mary Rankin MD

## 2017-09-29 NOTE — NURSING NOTE
"Chief Complaint   Patient presents with     Prenatal Care     30 weeks, soing well       Initial LMP 02/26/2017 Estimated body mass index is 30.65 kg/(m^2) as calculated from the following:    Height as of 9/8/17: 5' 3\" (1.6 m).    Weight as of 9/8/17: 173 lb (78.5 kg).  Medication Reconciliation: complete     Gayle Hutson      "

## 2017-09-30 ASSESSMENT — ANXIETY QUESTIONNAIRES: GAD7 TOTAL SCORE: 0

## 2017-10-12 ENCOUNTER — PRENATAL OFFICE VISIT (OUTPATIENT)
Dept: FAMILY MEDICINE | Facility: OTHER | Age: 32
End: 2017-10-12
Attending: FAMILY MEDICINE
Payer: COMMERCIAL

## 2017-10-12 VITALS
TEMPERATURE: 96.9 F | WEIGHT: 176 LBS | HEIGHT: 63 IN | HEART RATE: 90 BPM | DIASTOLIC BLOOD PRESSURE: 62 MMHG | OXYGEN SATURATION: 98 % | RESPIRATION RATE: 14 BRPM | SYSTOLIC BLOOD PRESSURE: 102 MMHG | BODY MASS INDEX: 31.18 KG/M2

## 2017-10-12 DIAGNOSIS — Z34.82 ENCOUNTER FOR SUPERVISION OF OTHER NORMAL PREGNANCY IN SECOND TRIMESTER: ICD-10-CM

## 2017-10-12 DIAGNOSIS — F33.0 MILD EPISODE OF RECURRENT MAJOR DEPRESSIVE DISORDER (H): ICD-10-CM

## 2017-10-12 DIAGNOSIS — Z34.83 ENCOUNTER FOR SUPERVISION OF OTHER NORMAL PREGNANCY IN THIRD TRIMESTER: ICD-10-CM

## 2017-10-12 LAB
ALBUMIN UR-MCNC: NEGATIVE MG/DL
APPEARANCE UR: CLEAR
BILIRUB UR QL STRIP: NEGATIVE
COLOR UR AUTO: YELLOW
GLUCOSE UR STRIP-MCNC: NEGATIVE MG/DL
HGB UR QL STRIP: ABNORMAL
KETONES UR STRIP-MCNC: NEGATIVE MG/DL
LEUKOCYTE ESTERASE UR QL STRIP: NEGATIVE
NITRATE UR QL: NEGATIVE
PH UR STRIP: 7.5 PH (ref 5–7)
SOURCE: ABNORMAL
SP GR UR STRIP: 1.01 (ref 1–1.03)
UROBILINOGEN UR STRIP-ACNC: 0.2 EU/DL (ref 0.2–1)

## 2017-10-12 PROCEDURE — 99207 ZZC PRENATAL VISIT: CPT | Performed by: FAMILY MEDICINE

## 2017-10-12 ASSESSMENT — ANXIETY QUESTIONNAIRES
7. FEELING AFRAID AS IF SOMETHING AWFUL MIGHT HAPPEN: NOT AT ALL
3. WORRYING TOO MUCH ABOUT DIFFERENT THINGS: NOT AT ALL
5. BEING SO RESTLESS THAT IT IS HARD TO SIT STILL: NOT AT ALL
6. BECOMING EASILY ANNOYED OR IRRITABLE: NOT AT ALL
GAD7 TOTAL SCORE: 0
2. NOT BEING ABLE TO STOP OR CONTROL WORRYING: NOT AT ALL
1. FEELING NERVOUS, ANXIOUS, OR ON EDGE: NOT AT ALL

## 2017-10-12 ASSESSMENT — PATIENT HEALTH QUESTIONNAIRE - PHQ9
SUM OF ALL RESPONSES TO PHQ QUESTIONS 1-9: 0
5. POOR APPETITE OR OVEREATING: NOT AT ALL

## 2017-10-12 NOTE — NURSING NOTE
"Chief Complaint   Patient presents with     Prenatal Care     34 weeks, 2 days.       Initial /62 (BP Location: Left arm, Patient Position: Sitting, Cuff Size: Adult Regular)  Pulse 90  Temp 96.9  F (36.1  C)  Resp 14  Ht 5' 3\" (1.6 m)  Wt 176 lb (79.8 kg)  LMP 02/26/2017  SpO2 98%  BMI 31.18 kg/m2 Estimated body mass index is 31.18 kg/(m^2) as calculated from the following:    Height as of this encounter: 5' 3\" (1.6 m).    Weight as of this encounter: 176 lb (79.8 kg).  Medication Reconciliation: complete   Analilia Cobos        "

## 2017-10-12 NOTE — MR AVS SNAPSHOT
After Visit Summary   10/12/2017    Rachel Villarreal    MRN: [id number]           Patient Information     Date Of Birth          1985        Visit Information        Provider Department      10/12/2017 4:30 PM Mary Rankin MD [facility]        Today's Diagnoses     Encounter for supervision of other normal pregnancy in third trimester        Mild episode of recurrent major depressive disorder (H)           Follow-ups after your visit        Follow-up notes from your care team     Return in about 2 weeks (around 10/26/2017).      Your next 10 appointments already scheduled     Oct 27, 2017  3:45 PM CDT   LAB with MT LAB   [facility] ([facility] )    [address]  [place] [address]   [phone]            Oct 27, 2017  4:00 PM CDT   (Arrive by 3:45 PM)   ESTABLISHED PRENATAL with Mary Rankin MD   [facility] ([facility] )    [address]  [place] [address]   [phone]            Nov 10, 2017  3:45 PM CST   LAB with MT LAB   [facility] ([facility] )    [address]  [place] [address]   [phone]            Nov 10, 2017  4:00 PM CST   (Arrive by 3:45 PM)   ESTABLISHED PRENATAL with Mary Rankin MD   [facility] ([facility] )    [address]  [place] [address]   [phone]            Nov 17, 2017  3:45 PM CST   LAB with MT LAB   [facility] ([facility] )    [address]  [place] [address]   [phone]            Nov 17, 2017  4:00 PM CST   (Arrive by 3:45 PM)   ESTABLISHED PRENATAL with Mary Rankin MD   [facility]    [address]  [place] [address]   [phone]            Nov 21, 2017  3:45 PM CST   LAB with  "MT LAB   [facility] ([facility] )    [address]  [place] [address]   [phone]            2017  4:00 PM CST   (Arrive by 3:45 PM)   ESTABLISHED PRENATAL with Mary Rankin MD   [facility] ([facility] )    [address]  [place] [address]   [phone]              Who to contact     If you have questions or need follow up information about today's clinic visit or your schedule please contact [facility] directly at [phone].  Normal or non-critical lab and imaging results will be communicated to you by MyChart, letter or phone within 4 business days after the clinic has received the results. If you do not hear from us within 7 days, please contact the clinic through MyChart or phone. If you have a critical or abnormal lab result, we will notify you by phone as soon as possible.  Submit refill requests through Photo Rankr or call your pharmacy and they will forward the refill request to us. Please allow 3 business days for your refill to be completed.          Additional Information About Your Visit        Photo Rankr Information     Photo Rankr lets you send messages to your doctor, view your test results, renew your prescriptions, schedule appointments and more. To sign up, go to [email or web address] . Click on \"Log in\" on the left side of the screen, which will take you to the Welcome page. Then click on \"Sign up Now\" on the right side of the page.     You will be asked to enter the access code listed below, as well as some personal information. Please follow the directions to create your username and password.     Your access code is: [email or web address]  Expires: 2017  3:58 PM     Your access code will  in 90 days. If you need help or a new code, please call your [facility] or [phone].        Care EveryWhere ID     This is your Care EveryWhere ID. This could be " "used by other organizations to access your [place] medical records  [id number]        Your Vitals Were     Pulse Temperature Respirations Height Last Period Pulse Oximetry    90 96.9  F (36.1  C) 14 5' 3\" (1.6 m) 02/26/2017 98%    BMI (Body Mass Index)                   31.18 kg/m2            Blood Pressure from Last 3 Encounters:   10/12/17 102/62   09/29/17 102/58   09/08/17 104/58    Weight from Last 3 Encounters:   10/12/17 176 lb (79.8 kg)   09/29/17 176 lb (79.8 kg)   09/08/17 173 lb (78.5 kg)              Today, you had the following     No orders found for display         Today's Medication Changes          These changes are accurate as of: 10/12/17  5:27 PM.  If you have any questions, ask your nurse or doctor.               These medicines have changed or have updated prescriptions.        Dose/Directions    sertraline 50 MG tablet   Commonly known as:  ZOLOFT   This may have changed:  See the new instructions.   Used for:  Mild episode of recurrent major depressive disorder (H)   Changed by:  Mary Rankin MD        Dose:  50 mg   Take 1 tablet (50 mg) by mouth daily   Quantity:  90 tablet   Refills:  0            Where to get your medicines      These medications were sent to [facility] [address] [place] [address]  AT [facility] [address]  [address] [place] [address] [address] [facility] [address]     Phone:  [phone]     sertraline 50 MG tablet                Primary Care Provider Office Phone # Fax #    Mary Rankin [phone] [address] [facility] [address]        Equal Access to Services     MARIE HAGAN : Hadjulio Santiago, waaxda luqadaha, qaybta kaalcorrine campbell. So [facility] [phone].    ATENCIÓN: Si habla español, tiene a davis disposición servicios gratuitos de asistencia lingüística. Llame al [phone].    We comply with applicable federal civil rights laws and " [place] laws. We do not discriminate on the basis of race, color, national origin, age, disability, sex, sexual orientation, or gender identity.            Thank you!     Thank you for choosing [facility]  for your care. Our goal is always to provide you with excellent care. Hearing back from our patients is one way we can continue to improve our services. Please take a few minutes to complete the written survey that you may receive in the mail after your visit with us. Thank you!             Your Updated Medication List - Protect others around you: Learn how to safely use, store and throw away your medicines at www.disposemymeds.org.          This list is accurate as of: 10/12/17  5:27 PM.  Always use your most recent med list.                   Brand Name Dispense Instructions for use Diagnosis    PRENATAL VITAMINS PO      Reported on 5/11/2017        sertraline 50 MG tablet    ZOLOFT    90 tablet    Take 1 tablet (50 mg) by mouth daily    Mild episode of recurrent major depressive disorder (H)       valACYclovir 500 MG tablet    VALTREX     Take 500 mg by mouth daily as needed Reported on 5/11/2017

## 2017-10-12 NOTE — PROGRESS NOTES
"S:  Doing well, no cramping or contractions, no bleeding or spotting, good fetal movement    O:  /62 (BP Location: Left arm, Patient Position: Sitting, Cuff Size: Adult Regular)  Pulse 90  Temp 96.9  F (36.1  C)  Resp 14  Ht 5' 3\" (1.6 m)  Wt 176 lb (79.8 kg)  LMP 02/26/2017  SpO2 98%  BMI 31.18 kg/m2  Abd: fundus at 32 cm  FHTs 140s    ASSESSMENT / PLAN:  (Z34.83) Encounter for supervision of other normal pregnancy in third trimester  Comment:   Plan: Follow-up in two weeks, start HSV prophylaxis at that time    (F33.0) Mild episode of recurrent major depressive disorder (H)  Comment:   Plan: sertraline (ZOLOFT) 50 MG tablet        Refilled, increase to 50 mg daily.        Mary Rankin MD      "

## 2017-10-13 ASSESSMENT — ANXIETY QUESTIONNAIRES: GAD7 TOTAL SCORE: 0

## 2017-10-27 ENCOUNTER — PRENATAL OFFICE VISIT (OUTPATIENT)
Dept: FAMILY MEDICINE | Facility: OTHER | Age: 32
End: 2017-10-27
Attending: FAMILY MEDICINE
Payer: COMMERCIAL

## 2017-10-27 VITALS
SYSTOLIC BLOOD PRESSURE: 108 MMHG | TEMPERATURE: 97.9 F | RESPIRATION RATE: 20 BRPM | DIASTOLIC BLOOD PRESSURE: 70 MMHG | HEART RATE: 89 BPM | WEIGHT: 174 LBS | BODY MASS INDEX: 30.83 KG/M2 | OXYGEN SATURATION: 99 % | HEIGHT: 63 IN

## 2017-10-27 DIAGNOSIS — Z34.82 ENCOUNTER FOR SUPERVISION OF OTHER NORMAL PREGNANCY IN SECOND TRIMESTER: ICD-10-CM

## 2017-10-27 DIAGNOSIS — A60.00 HERPES SIMPLEX INFECTION OF GENITOURINARY SYSTEM: ICD-10-CM

## 2017-10-27 DIAGNOSIS — Z34.83 ENCOUNTER FOR SUPERVISION OF OTHER NORMAL PREGNANCY IN THIRD TRIMESTER: Primary | ICD-10-CM

## 2017-10-27 LAB
ALBUMIN UR-MCNC: NEGATIVE MG/DL
APPEARANCE UR: CLEAR
BILIRUB UR QL STRIP: NEGATIVE
COLOR UR AUTO: YELLOW
GLUCOSE UR STRIP-MCNC: NEGATIVE MG/DL
HGB UR QL STRIP: NEGATIVE
KETONES UR STRIP-MCNC: NEGATIVE MG/DL
LEUKOCYTE ESTERASE UR QL STRIP: NEGATIVE
NITRATE UR QL: NEGATIVE
PH UR STRIP: 6 PH (ref 5–7)
SOURCE: NORMAL
SP GR UR STRIP: 1.02 (ref 1–1.03)
UROBILINOGEN UR STRIP-ACNC: 0.2 EU/DL (ref 0.2–1)

## 2017-10-27 PROCEDURE — 99207 ZZC PRENATAL VISIT: CPT | Performed by: FAMILY MEDICINE

## 2017-10-27 RX ORDER — ACYCLOVIR 400 MG/1
400 TABLET ORAL 2 TIMES DAILY
Qty: 60 TABLET | Refills: 2 | Status: ON HOLD | OUTPATIENT
Start: 2017-10-27 | End: 2017-11-27

## 2017-10-27 ASSESSMENT — ANXIETY QUESTIONNAIRES
GAD7 TOTAL SCORE: 0
5. BEING SO RESTLESS THAT IT IS HARD TO SIT STILL: NOT AT ALL
6. BECOMING EASILY ANNOYED OR IRRITABLE: NOT AT ALL
1. FEELING NERVOUS, ANXIOUS, OR ON EDGE: NOT AT ALL
2. NOT BEING ABLE TO STOP OR CONTROL WORRYING: NOT AT ALL
7. FEELING AFRAID AS IF SOMETHING AWFUL MIGHT HAPPEN: NOT AT ALL
4. TROUBLE RELAXING: NOT AT ALL
3. WORRYING TOO MUCH ABOUT DIFFERENT THINGS: NOT AT ALL

## 2017-10-27 ASSESSMENT — PATIENT HEALTH QUESTIONNAIRE - PHQ9: SUM OF ALL RESPONSES TO PHQ QUESTIONS 1-9: 0

## 2017-10-27 ASSESSMENT — PAIN SCALES - GENERAL: PAINLEVEL: NO PAIN (0)

## 2017-10-27 NOTE — MR AVS SNAPSHOT
After Visit Summary   10/27/2017    Rachel Villarreal    MRN: [id number]           Patient Information     Date Of Birth          1985        Visit Information        Provider Department      10/27/2017 4:00 PM Mary Rankin MD [facility]        Today's Diagnoses     Encounter for supervision of other normal pregnancy in third trimester    -  1    Herpes simplex infection of genitourinary system           Follow-ups after your visit        Follow-up notes from your care team     Return in about 2 weeks (around 11/10/2017).      Your next 10 appointments already scheduled     Nov 10, 2017  3:45 PM CST   LAB with MT LAB   [facility] ([facility] )    [address]  [place] [address]   [phone]            Nov 10, 2017  4:00 PM CST   (Arrive by 3:45 PM)   ESTABLISHED PRENATAL with Mary Rankin MD   [facility] ([facility] )    [address]  [place] [address]   [phone]            Nov 17, 2017  3:45 PM CST   LAB with MT LAB   [facility] ([facility] )    [address]  [place] [address]   [phone]            Nov 17, 2017  4:00 PM CST   (Arrive by 3:45 PM)   ESTABLISHED PRENATAL with Mary Rankin MD   [facility] ([facility] )    [address]  [place] [address]   [phone]            Nov 21, 2017  3:45 PM CST   LAB with MT LAB   [facility]    [address]  [place] [address]   [phone]            Nov 21, 2017  4:00 PM CST   (Arrive by 3:45 PM)   ESTABLISHED PRENATAL with Mary Rankin MD   [facility] ([facility] )    [address]  [place] [address]   [phone]              Who to contact     If you have  "questions or need follow up information about today's clinic visit or your schedule please contact [facility] directly at [phone].  Normal or non-critical lab and imaging results will be communicated to you by MyChart, letter or phone within 4 business days after the clinic has received the results. If you do not hear from us within 7 days, please contact the clinic through Durianahart or phone. If you have a critical or abnormal lab result, we will notify you by phone as soon as possible.  Submit refill requests through Marketo or call your pharmacy and they will forward the refill request to us. Please allow 3 business days for your refill to be completed.          Additional Information About Your Visit        DurianaharGecko Audio Information     Marketo lets you send messages to your doctor, view your test results, renew your prescriptions, schedule appointments and more. To sign up, go to [email or web address] . Click on \"Log in\" on the left side of the screen, which will take you to the Welcome page. Then click on \"Sign up Now\" on the right side of the page.     You will be asked to enter the access code listed below, as well as some personal information. Please follow the directions to create your username and password.     Your access code is: [email or web address]  Expires: 2017  3:58 PM     Your access code will  in 90 days. If you need help or a new code, please call your [place] clinic or [phone].        Care EveryWhere ID     This is your Care EveryWhere ID. This could be used by other organizations to access your [place] medical records  [id number]        Your Vitals Were     Pulse Temperature Respirations Height Last Period Pulse Oximetry    89 97.9  F (36.6  C) (Tympanic) 20 5' 3\" (1.6 m) 2017 99%    BMI (Body Mass Index)                   30.82 kg/m2            Blood Pressure from Last 3 Encounters:   10/27/17 108/70   10/12/17 102/62   17 102/58    Weight from Last 3 " Encounters:   10/27/17 174 lb (78.9 kg)   10/12/17 176 lb (79.8 kg)   09/29/17 176 lb (79.8 kg)              Today, you had the following     No orders found for display         Today's Medication Changes          These changes are accurate as of: 10/27/17  5:06 PM.  If you have any questions, ask your nurse or doctor.               Start taking these medicines.        Dose/Directions    acyclovir 400 MG tablet   Commonly known as:  ZOVIRAX   Used for:  Herpes simplex infection of genitourinary system   Started by:  Mary Rnakin MD        Dose:  400 mg   Take 1 tablet (400 mg) by mouth 2 times daily   Quantity:  60 tablet   Refills:  2            Where to get your medicines      These medications were sent to [facility] [address] [place] [address]  AT [facility] [address]  [address] [place] [address] [place] [address]     Phone:  [phone]     acyclovir 400 MG tablet                Primary Care Provider Office Phone # Fax #    Mary Rankin [phone] [address] [facility] [address]        Equal Access to Services     MARIE HAGAN AH: Hadii alanis ku hadasho Sopaali, waaxda luqadaha, qaybta kaalmada adeegyada, corrine christina . So [facility] [phone].    ATENCIÓN: Si habla español, tiene a davis disposición servicios gratuitos de asistencia lingüística. Llame al [phone].    We comply with applicable federal civil rights laws and [place] laws. We do not discriminate on the basis of race, color, national origin, age, disability, sex, sexual orientation, or gender identity.            Thank you!     Thank you for choosing [facility]  for your care. Our goal is always to provide you with excellent care. Hearing back from our patients is one way we can continue to improve our services. Please take a few minutes to complete the written survey that you may receive in the mail after your visit with us.  Thank you!             Your Updated Medication List - Protect others around you: Learn how to safely use, store and throw away your medicines at www.disposemymeds.org.          This list is accurate as of: 10/27/17  5:06 PM.  Always use your most recent med list.                   Brand Name Dispense Instructions for use Diagnosis    acyclovir 400 MG tablet    ZOVIRAX    60 tablet    Take 1 tablet (400 mg) by mouth 2 times daily    Herpes simplex infection of genitourinary system       PRENATAL VITAMINS PO      Reported on 5/11/2017        sertraline 50 MG tablet    ZOLOFT    90 tablet    Take 1 tablet (50 mg) by mouth daily    Mild episode of recurrent major depressive disorder (H)       valACYclovir 500 MG tablet    VALTREX     Take 500 mg by mouth daily as needed Reported on 5/11/2017

## 2017-10-27 NOTE — PROGRESS NOTES
"S:  Doing well, no cramping or contractions, no bleeding or spotting, good fetal movement    O:  /70 (BP Location: Right arm, Patient Position: Chair, Cuff Size: Adult Regular)  Pulse 89  Temp 97.9  F (36.6  C) (Tympanic)  Resp 20  Ht 5' 3\" (1.6 m)  Wt 174 lb (78.9 kg)  LMP 02/26/2017  SpO2 99%  BMI 30.82 kg/m2  Abd: fundus at 34 cm  FHTs 150s    ASSESSMENT / PLAN:  (Z34.83) Encounter for supervision of other normal pregnancy in third trimester  (primary encounter diagnosis)  Comment:   Plan: Follow-up in 2 weeks, GBS and cervical exam at that time    (A60.00) Herpes simplex infection of genitourinary system  Comment:   Plan: acyclovir (ZOVIRAX) 400 MG tablet        Will start HSV prophylaxis at this time.        Mary Rankin MD      "

## 2017-10-27 NOTE — NURSING NOTE
"Chief Complaint   Patient presents with     Prenatal Care     34w5d       Initial /70 (BP Location: Right arm, Patient Position: Chair, Cuff Size: Adult Regular)  Pulse 89  Temp 97.9  F (36.6  C) (Tympanic)  Resp 20  Ht 5' 3\" (1.6 m)  Wt 174 lb (78.9 kg)  LMP 02/26/2017  SpO2 99%  BMI 30.82 kg/m2 Estimated body mass index is 30.82 kg/(m^2) as calculated from the following:    Height as of this encounter: 5' 3\" (1.6 m).    Weight as of this encounter: 174 lb (78.9 kg).  Medication Reconciliation: anne iSnha      "

## 2017-10-28 ASSESSMENT — ANXIETY QUESTIONNAIRES: GAD7 TOTAL SCORE: 0

## 2017-11-10 ENCOUNTER — PRENATAL OFFICE VISIT (OUTPATIENT)
Dept: FAMILY MEDICINE | Facility: OTHER | Age: 32
End: 2017-11-10
Attending: FAMILY MEDICINE
Payer: COMMERCIAL

## 2017-11-10 VITALS
HEART RATE: 88 BPM | DIASTOLIC BLOOD PRESSURE: 70 MMHG | HEIGHT: 63 IN | RESPIRATION RATE: 14 BRPM | TEMPERATURE: 97.8 F | SYSTOLIC BLOOD PRESSURE: 110 MMHG | BODY MASS INDEX: 32.07 KG/M2 | WEIGHT: 181 LBS

## 2017-11-10 DIAGNOSIS — Z34.83 ENCOUNTER FOR SUPERVISION OF OTHER NORMAL PREGNANCY IN THIRD TRIMESTER: Primary | ICD-10-CM

## 2017-11-10 DIAGNOSIS — Z34.82 ENCOUNTER FOR SUPERVISION OF OTHER NORMAL PREGNANCY IN SECOND TRIMESTER: ICD-10-CM

## 2017-11-10 PROCEDURE — 99207 ZZC PRENATAL VISIT: CPT | Performed by: FAMILY MEDICINE

## 2017-11-10 PROCEDURE — 87653 STREP B DNA AMP PROBE: CPT | Performed by: FAMILY MEDICINE

## 2017-11-10 ASSESSMENT — ANXIETY QUESTIONNAIRES
3. WORRYING TOO MUCH ABOUT DIFFERENT THINGS: NOT AT ALL
2. NOT BEING ABLE TO STOP OR CONTROL WORRYING: NOT AT ALL
1. FEELING NERVOUS, ANXIOUS, OR ON EDGE: NOT AT ALL
GAD7 TOTAL SCORE: 0
5. BEING SO RESTLESS THAT IT IS HARD TO SIT STILL: NOT AT ALL
7. FEELING AFRAID AS IF SOMETHING AWFUL MIGHT HAPPEN: NOT AT ALL
6. BECOMING EASILY ANNOYED OR IRRITABLE: NOT AT ALL

## 2017-11-10 ASSESSMENT — PATIENT HEALTH QUESTIONNAIRE - PHQ9
SUM OF ALL RESPONSES TO PHQ QUESTIONS 1-9: 0
5. POOR APPETITE OR OVEREATING: NOT AT ALL

## 2017-11-10 NOTE — PROGRESS NOTES
"S:  Doing well, no cramping or contractions, no bleeding or spotting, good fetal movement    O:  /70 (BP Location: Right arm, Patient Position: Sitting, Cuff Size: Adult Regular)  Pulse 88  Temp 97.8  F (36.6  C) (Tympanic)  Resp 14  Ht 5' 3\" (1.6 m)  Wt 181 lb (82.1 kg)  LMP 02/26/2017  BMI 32.06 kg/m2  Abd: vtx by Leopolds  FHTs 140s  Cervix thinning, -4 station, pretty sure vtx positioning    ASSESSMENT / PLAN:  (Z34.83) Encounter for supervision of other normal pregnancy in third trimester  (primary encounter diagnosis)  Comment:   Plan: Group B strep PCR, US OB Limited One Or More         Fetuses ([place])        GBS collected, will order ultrasound for fetal positioning.  Follow-up weekly through delivery.        Mary Rankin MD      "

## 2017-11-10 NOTE — MR AVS SNAPSHOT
After Visit Summary   11/10/2017    Rachel Villarreal    MRN: [id number]           Patient Information     Date Of Birth          1985        Visit Information        Provider Department      11/10/2017 4:00 PM Mary Rankin MD [facility]        Today's Diagnoses     Encounter for supervision of other normal pregnancy in third trimester    -  1       Follow-ups after your visit        Follow-up notes from your care team     Return in about 1 week (around 11/17/2017).      Your next 10 appointments already scheduled     Nov 16, 2017  3:45 PM CST   LAB with MT LAB   [facility] ([facility] [place] )    [address]  [place] [address]   [phone]            Nov 16, 2017  4:00 PM CST   (Arrive by 3:45 PM)   ESTABLISHED PRENATAL with Mary Rankin MD   [facility] ([facility] [place] )    [address]  [place] [address]   [phone]            Nov 21, 2017  3:45 PM CST   LAB with MT LAB   [facility] ([facility] [place] )    [address]  [place] [address]   [phone]            Nov 21, 2017  4:00 PM CST   (Arrive by 3:45 PM)   ESTABLISHED PRENATAL with Mary Rankin MD   [facility] ([facility] [place] )    [address]  [place] [address]   [phone]              Future tests that were ordered for you today     Open Future Orders        Priority Expected Expires Ordered    US OB Limited One Or More Fetuses ([place]) Routine  11/10/2018 11/10/2017            Who to contact     If you have questions or need follow up information about today's clinic visit or your schedule please contact [facility] directly at [phone].  Normal or non-critical lab and imaging results will be communicated to you by MyChart, letter or phone within 4 business days after the clinic  "has received the results. If you do not hear from us within 7 days, please contact the clinic through [employer] or phone. If you have a critical or abnormal lab result, we will notify you by phone as soon as possible.  Submit refill requests through [employer] or call your pharmacy and they will forward the refill request to us. Please allow 3 business days for your refill to be completed.          Additional Information About Your Visit        [employer] Information     [employer] lets you send messages to your doctor, view your test results, renew your prescriptions, schedule appointments and more. To sign up, go to [email or web address] . Click on \"Log in\" on the left side of the screen, which will take you to the Welcome page. Then click on \"Sign up Now\" on the right side of the page.     You will be asked to enter the access code listed below, as well as some personal information. Please follow the directions to create your username and password.     Your access code is: [email or web address]  Expires: 2017  2:58 PM     Your access code will  in 90 days. If you need help or a new code, please call your [place] clinic or [phone].        Care EveryWhere ID     This is your Care EveryWhere ID. This could be used by other organizations to access your [place] medical records  [id number]        Your Vitals Were     Pulse Temperature Respirations Height Last Period BMI (Body Mass Index)    88 97.8  F (36.6  C) (Tympanic) 14 5' 3\" (1.6 m) 2017 32.06 kg/m2       Blood Pressure from Last 3 Encounters:   11/10/17 110/70   10/27/17 108/70   10/12/17 102/62    Weight from Last 3 Encounters:   11/10/17 181 lb (82.1 kg)   10/27/17 174 lb (78.9 kg)   10/12/17 176 lb (79.8 kg)              We Performed the Following     Group B strep PCR        Primary Care Provider Office Phone # Fax #    Mary Rankin [phone] [address] [facility] [address]        Equal Access to " Services     [facility]: Hadii alanis Santiago, wafritzda luqadaha, qaybta kaalmacirilo marin, corrine katz. So [facility] [phone].    ATENCIÓN: Si habla mariela, tiene a davis disposición servicios gratuitos de asistencia lingüística. Llame al [phone].    We comply with applicable federal civil rights laws and [place] laws. We do not discriminate on the basis of race, color, national origin, age, disability, sex, sexual orientation, or gender identity.            Thank you!     Thank you for choosing [facility]  for your care. Our goal is always to provide you with excellent care. Hearing back from our patients is one way we can continue to improve our services. Please take a few minutes to complete the written survey that you may receive in the mail after your visit with us. Thank you!             Your Updated Medication List - Protect others around you: Learn how to safely use, store and throw away your medicines at www.disposemymeds.org.          This list is accurate as of: 11/10/17  5:47 PM.  Always use your most recent med list.                   Brand Name Dispense Instructions for use Diagnosis    acyclovir 400 MG tablet    ZOVIRAX    60 tablet    Take 1 tablet (400 mg) by mouth 2 times daily    Herpes simplex infection of genitourinary system       PRENATAL VITAMINS PO      Reported on 5/11/2017        sertraline 50 MG tablet    ZOLOFT    90 tablet    Take 1 tablet (50 mg) by mouth daily    Mild episode of recurrent major depressive disorder (H)       valACYclovir 500 MG tablet    VALTREX     Take 500 mg by mouth daily as needed Reported on 5/11/2017

## 2017-11-10 NOTE — NURSING NOTE
"Chief Complaint   Patient presents with     Prenatal Care     36 weeks, 6 days.       Initial /70 (BP Location: Right arm, Patient Position: Sitting, Cuff Size: Adult Regular)  Pulse 88  Temp 97.8  F (36.6  C) (Tympanic)  Resp 14  Ht 5' 3\" (1.6 m)  Wt 181 lb (82.1 kg)  LMP 02/26/2017  BMI 32.06 kg/m2 Estimated body mass index is 32.06 kg/(m^2) as calculated from the following:    Height as of this encounter: 5' 3\" (1.6 m).    Weight as of this encounter: 181 lb (82.1 kg).  Medication Reconciliation: complete   Analilia Cobos      "

## 2017-11-10 NOTE — PROGRESS NOTES
Breastfeeding education provided:  - Supporting a non-medicated birth  - Skin to Skin  - Non-separation of mother and baby

## 2017-11-11 ASSESSMENT — ANXIETY QUESTIONNAIRES: GAD7 TOTAL SCORE: 0

## 2017-11-14 LAB
GP B STREP DNA SPEC QL NAA+PROBE: NEGATIVE
SPECIMEN SOURCE: NORMAL

## 2017-11-15 ENCOUNTER — HOSPITAL ENCOUNTER (OUTPATIENT)
Dept: ULTRASOUND IMAGING | Facility: HOSPITAL | Age: 32
Discharge: HOME | End: 2017-11-15
Attending: FAMILY MEDICINE | Admitting: FAMILY MEDICINE
Payer: COMMERCIAL

## 2017-11-15 DIAGNOSIS — Z34.83 ENCOUNTER FOR SUPERVISION OF OTHER NORMAL PREGNANCY IN THIRD TRIMESTER: ICD-10-CM

## 2017-11-15 PROCEDURE — 76815 OB US LIMITED FETUS(S): CPT | Mod: TC

## 2017-11-16 ENCOUNTER — PRENATAL OFFICE VISIT (OUTPATIENT)
Dept: FAMILY MEDICINE | Facility: OTHER | Age: 32
End: 2017-11-16
Attending: FAMILY MEDICINE
Payer: COMMERCIAL

## 2017-11-16 VITALS
SYSTOLIC BLOOD PRESSURE: 120 MMHG | WEIGHT: 181 LBS | DIASTOLIC BLOOD PRESSURE: 66 MMHG | RESPIRATION RATE: 14 BRPM | BODY MASS INDEX: 32.06 KG/M2 | HEART RATE: 80 BPM

## 2017-11-16 DIAGNOSIS — Z34.83 ENCOUNTER FOR SUPERVISION OF OTHER NORMAL PREGNANCY IN THIRD TRIMESTER: ICD-10-CM

## 2017-11-16 DIAGNOSIS — Z34.82 ENCOUNTER FOR SUPERVISION OF OTHER NORMAL PREGNANCY IN SECOND TRIMESTER: ICD-10-CM

## 2017-11-16 LAB
ALBUMIN UR-MCNC: NEGATIVE MG/DL
APPEARANCE UR: ABNORMAL
BILIRUB UR QL STRIP: NEGATIVE
COLOR UR AUTO: YELLOW
GLUCOSE UR STRIP-MCNC: NEGATIVE MG/DL
HGB UR QL STRIP: ABNORMAL
KETONES UR STRIP-MCNC: NEGATIVE MG/DL
LEUKOCYTE ESTERASE UR QL STRIP: ABNORMAL
NITRATE UR QL: NEGATIVE
PH UR STRIP: 6.5 PH (ref 5–7)
SOURCE: ABNORMAL
SP GR UR STRIP: 1.01 (ref 1–1.03)
UROBILINOGEN UR STRIP-ACNC: 1 EU/DL (ref 0.2–1)

## 2017-11-16 PROCEDURE — 99207 ZZC PRENATAL VISIT: CPT | Performed by: FAMILY MEDICINE

## 2017-11-16 ASSESSMENT — PATIENT HEALTH QUESTIONNAIRE - PHQ9: SUM OF ALL RESPONSES TO PHQ QUESTIONS 1-9: 0

## 2017-11-16 ASSESSMENT — ANXIETY QUESTIONNAIRES
7. FEELING AFRAID AS IF SOMETHING AWFUL MIGHT HAPPEN: NOT AT ALL
5. BEING SO RESTLESS THAT IT IS HARD TO SIT STILL: NOT AT ALL
6. BECOMING EASILY ANNOYED OR IRRITABLE: NOT AT ALL
2. NOT BEING ABLE TO STOP OR CONTROL WORRYING: NOT AT ALL
IF YOU CHECKED OFF ANY PROBLEMS ON THIS QUESTIONNAIRE, HOW DIFFICULT HAVE THESE PROBLEMS MADE IT FOR YOU TO DO YOUR WORK, TAKE CARE OF THINGS AT HOME, OR GET ALONG WITH OTHER PEOPLE: NOT DIFFICULT AT ALL
4. TROUBLE RELAXING: NOT AT ALL
GAD7 TOTAL SCORE: 0
3. WORRYING TOO MUCH ABOUT DIFFERENT THINGS: NOT AT ALL
1. FEELING NERVOUS, ANXIOUS, OR ON EDGE: NOT AT ALL

## 2017-11-16 NOTE — NURSING NOTE
"Chief Complaint   Patient presents with     Prenatal Care       Initial /66 (BP Location: Left arm, Patient Position: Sitting, Cuff Size: Adult Regular)  Pulse 80  Resp 14  Wt 181 lb (82.1 kg)  LMP 02/26/2017  BMI 32.06 kg/m2 Estimated body mass index is 32.06 kg/(m^2) as calculated from the following:    Height as of 11/10/17: 5' 3\" (1.6 m).    Weight as of this encounter: 181 lb (82.1 kg).  Medication Reconciliation: complete       Skylar Rocha      "

## 2017-11-16 NOTE — PROGRESS NOTES
S:  Doing well, no cramping or contractions, no bleeding or spotting, good fetal movement    O:  /66 (BP Location: Left arm, Patient Position: Sitting, Cuff Size: Adult Regular)  Pulse 80  Resp 14  Wt 181 lb (82.1 kg)  LMP 02/26/2017  BMI 32.06 kg/m2  Abd: vtx by Leopolds  FHTs 140    ASSESSMENT / PLAN:  (Z34.83) Encounter for supervision of other normal pregnancy in third trimester  Comment:   Plan: GBS was negative.  Follow-up weekly through delivery        Mary Rankin MD

## 2017-11-17 ASSESSMENT — ANXIETY QUESTIONNAIRES: GAD7 TOTAL SCORE: 0

## 2017-11-21 ENCOUNTER — PRENATAL OFFICE VISIT (OUTPATIENT)
Dept: FAMILY MEDICINE | Facility: OTHER | Age: 32
End: 2017-11-21
Attending: FAMILY MEDICINE
Payer: COMMERCIAL

## 2017-11-21 VITALS — SYSTOLIC BLOOD PRESSURE: 108 MMHG | DIASTOLIC BLOOD PRESSURE: 68 MMHG | BODY MASS INDEX: 32.06 KG/M2 | WEIGHT: 181 LBS

## 2017-11-21 DIAGNOSIS — Z34.82 ENCOUNTER FOR SUPERVISION OF OTHER NORMAL PREGNANCY IN SECOND TRIMESTER: ICD-10-CM

## 2017-11-21 DIAGNOSIS — Z34.83 ENCOUNTER FOR SUPERVISION OF OTHER NORMAL PREGNANCY IN THIRD TRIMESTER: ICD-10-CM

## 2017-11-21 LAB
ALBUMIN UR-MCNC: NEGATIVE MG/DL
APPEARANCE UR: ABNORMAL
BILIRUB UR QL STRIP: NEGATIVE
COLOR UR AUTO: YELLOW
GLUCOSE UR STRIP-MCNC: NEGATIVE MG/DL
HGB UR QL STRIP: NEGATIVE
KETONES UR STRIP-MCNC: NEGATIVE MG/DL
LEUKOCYTE ESTERASE UR QL STRIP: ABNORMAL
NITRATE UR QL: NEGATIVE
PH UR STRIP: 6 PH (ref 5–7)
SOURCE: ABNORMAL
SP GR UR STRIP: 1.02 (ref 1–1.03)
UROBILINOGEN UR STRIP-ACNC: 0.2 EU/DL (ref 0.2–1)

## 2017-11-21 PROCEDURE — 99207 ZZC PRENATAL VISIT: CPT | Performed by: FAMILY MEDICINE

## 2017-11-21 ASSESSMENT — ANXIETY QUESTIONNAIRES
3. WORRYING TOO MUCH ABOUT DIFFERENT THINGS: NOT AT ALL
6. BECOMING EASILY ANNOYED OR IRRITABLE: NOT AT ALL
4. TROUBLE RELAXING: NOT AT ALL
GAD7 TOTAL SCORE: 0
1. FEELING NERVOUS, ANXIOUS, OR ON EDGE: NOT AT ALL
7. FEELING AFRAID AS IF SOMETHING AWFUL MIGHT HAPPEN: NOT AT ALL
5. BEING SO RESTLESS THAT IT IS HARD TO SIT STILL: NOT AT ALL
2. NOT BEING ABLE TO STOP OR CONTROL WORRYING: NOT AT ALL

## 2017-11-21 ASSESSMENT — PATIENT HEALTH QUESTIONNAIRE - PHQ9: SUM OF ALL RESPONSES TO PHQ QUESTIONS 1-9: 0

## 2017-11-21 NOTE — MR AVS SNAPSHOT
After Visit Summary   11/21/2017    Rachel Villarreal    MRN: [id number]           Patient Information     Date Of Birth          1985        Visit Information        Provider Department      11/21/2017 4:00 PM Mary Rankin MD [facility]        Today's Diagnoses     Encounter for supervision of other normal pregnancy in third trimester           Follow-ups after your visit        Follow-up notes from your care team     Return if symptoms worsen or fail to improve.      Your next 10 appointments already scheduled     Nov 27, 2017  4:15 PM CST   LAB with MT LAB   [facility] ([facility] )    [address]  [facility] [address]   [phone]            Nov 27, 2017  4:30 PM CST   (Arrive by 4:15 PM)   ESTABLISHED PRENATAL with Mary Rankin MD   [facility] ([facility] )    [address]  [facility] [address]   [phone]              Who to contact     If you have questions or need follow up information about today's clinic visit or your schedule please contact [facility] directly at [phone].  Normal or non-critical lab and imaging results will be communicated to you by MyChart, letter or phone within 4 business days after the clinic has received the results. If you do not hear from us within 7 days, please contact the clinic through [employer] or phone. If you have a critical or abnormal lab result, we will notify you by phone as soon as possible.  Submit refill requests through [employer] or call your pharmacy and they will forward the refill request to us. Please allow 3 business days for your refill to be completed.          Additional Information About Your Visit        MyChart Information     [employer] lets you send messages to your doctor, view your test results, renew your prescriptions, schedule appointments and more. To sign up, go to  [email or web address] . Click on \"Log in\" on the left side of the screen, which will take you to the Welcome page. Then click on \"Sign up Now\" on the right side of the page.     You will be asked to enter the access code listed below, as well as some personal information. Please follow the directions to create your username and password.     Your access code is: [email or web address]  Expires: 2017  2:58 PM     Your access code will  in 90 days. If you need help or a new code, please call your [place] clinic or [phone].        Care EveryWhere ID     This is your Care EveryWhere ID. This could be used by other organizations to access your [place] medical records  [id number]        Your Vitals Were     Last Period BMI (Body Mass Index)                2017 32.06 kg/m2           Blood Pressure from Last 3 Encounters:   17 108/68   17 120/66   11/10/17 110/70    Weight from Last 3 Encounters:   17 181 lb (82.1 kg)   17 181 lb (82.1 kg)   11/10/17 181 lb (82.1 kg)              Today, you had the following     No orders found for display       Primary Care Provider Office Phone # Fax #    Mary Rankin [phone] [address] [facility] [address]        Equal Access to Services     [facility]: Hadii alanis mahajan hadasho Somarco a, waaxda luqadaha, qaybta kaalmada baltada, corrine katz. So [facility] [phone].    ATENCIÓN: Si habla español, tiene a davis disposición servicios gratuitos de asistencia lingüística. Llame al [phone].    We comply with applicable federal civil rights laws and [place] laws. We do not discriminate on the basis of race, color, national origin, age, disability, sex, sexual orientation, or gender identity.            Thank you!     Thank you for choosing [facility]  for your care. Our goal is always to provide you with excellent care. Hearing back from our patients is one " way we can continue to improve our services. Please take a few minutes to complete the written survey that you may receive in the mail after your visit with us. Thank you!             Your Updated Medication List - Protect others around you: Learn how to safely use, store and throw away your medicines at www.disposemymeds.org.          This list is accurate as of: 11/21/17 11:59 PM.  Always use your most recent med list.                   Brand Name Dispense Instructions for use Diagnosis    acyclovir 400 MG tablet    ZOVIRAX    60 tablet    Take 1 tablet (400 mg) by mouth 2 times daily    Herpes simplex infection of genitourinary system       PRENATAL VITAMINS PO      Reported on 5/11/2017        sertraline 50 MG tablet    ZOLOFT    90 tablet    Take 1 tablet (50 mg) by mouth daily    Mild episode of recurrent major depressive disorder (H)       valACYclovir 500 MG tablet    VALTREX     Take 500 mg by mouth daily as needed Reported on 5/11/2017

## 2017-11-21 NOTE — NURSING NOTE
"Chief Complaint   Patient presents with     Prenatal Care     38 weeks and 1 day, some tightening since last visit       Initial /68 (BP Location: Left arm, Patient Position: Sitting, Cuff Size: Adult Regular)  Wt 181 lb (82.1 kg)  LMP 02/26/2017  BMI 32.06 kg/m2 Estimated body mass index is 32.06 kg/(m^2) as calculated from the following:    Height as of 11/10/17: 5' 3\" (1.6 m).    Weight as of this encounter: 181 lb (82.1 kg).  Medication Reconciliation: complete     Gayle Hutson      "

## 2017-11-22 ASSESSMENT — ANXIETY QUESTIONNAIRES: GAD7 TOTAL SCORE: 0

## 2017-11-24 NOTE — PROGRESS NOTES
S:  Doing well, no cramping or contractions, no bleeding or spotting, good fetal movement    O:  /68 (BP Location: Left arm, Patient Position: Sitting, Cuff Size: Adult Regular)  Wt 181 lb (82.1 kg)  LMP 02/26/2017  BMI 32.06 kg/m2  Abd: vtx by Leopolds  FHTs 140s  Cervix 1.5/40/-3    ASSESSMENT / PLAN:  (Z34.83) Encounter for supervision of other normal pregnancy in third trimester  Comment:   Plan: Follow-up weekly through delivery        Mary Rankin MD

## 2017-11-25 ENCOUNTER — HOSPITAL ENCOUNTER (EMERGENCY)
Facility: HOSPITAL | Age: 32
End: 2017-11-25
Payer: COMMERCIAL

## 2017-11-25 ENCOUNTER — NURSE TRIAGE (OUTPATIENT)
Dept: NURSING | Facility: CLINIC | Age: 32
End: 2017-11-25

## 2017-11-25 ENCOUNTER — HOSPITAL ENCOUNTER (INPATIENT)
Facility: HOSPITAL | Age: 32
LOS: 2 days | Discharge: HOME | End: 2017-11-27
Attending: FAMILY MEDICINE | Admitting: FAMILY MEDICINE
Payer: COMMERCIAL

## 2017-11-25 PROBLEM — Z36.89 ENCOUNTER FOR TRIAGE IN PREGNANT PATIENT: Status: ACTIVE | Noted: 2017-11-25

## 2017-11-25 LAB
A1 MICROGLOB PLACENTAL VAG QL: POSITIVE
ABO + RH BLD: NORMAL
ABO + RH BLD: NORMAL
ERYTHROCYTE [DISTWIDTH] IN BLOOD BY AUTOMATED COUNT: 12.9 % (ref 10–15)
HCT VFR BLD AUTO: 32.8 % (ref 35–47)
HGB BLD-MCNC: 10.9 G/DL (ref 11.7–15.7)
MCH RBC QN AUTO: 27.4 PG (ref 26.5–33)
MCHC RBC AUTO-ENTMCNC: 33.2 G/DL (ref 31.5–36.5)
MCV RBC AUTO: 82 FL (ref 78–100)
PLATELET # BLD AUTO: 370 10E9/L (ref 150–450)
RBC # BLD AUTO: 3.98 10E12/L (ref 3.8–5.2)
SPECIMEN EXP DATE BLD: NORMAL
WBC # BLD AUTO: 9.2 10E9/L (ref 4–11)

## 2017-11-25 PROCEDURE — 3E0R3BZ INTRODUCTION OF ANESTHETIC AGENT INTO SPINAL CANAL, PERCUTANEOUS APPROACH: ICD-10-PCS | Performed by: NURSE ANESTHETIST, CERTIFIED REGISTERED

## 2017-11-25 PROCEDURE — 12000031 ZZH R&B OB CRITICAL

## 2017-11-25 PROCEDURE — 86901 BLOOD TYPING SEROLOGIC RH(D): CPT | Performed by: FAMILY MEDICINE

## 2017-11-25 PROCEDURE — 85027 COMPLETE CBC AUTOMATED: CPT | Performed by: FAMILY MEDICINE

## 2017-11-25 PROCEDURE — 86900 BLOOD TYPING SEROLOGIC ABO: CPT | Performed by: FAMILY MEDICINE

## 2017-11-25 PROCEDURE — 36415 COLL VENOUS BLD VENIPUNCTURE: CPT | Performed by: FAMILY MEDICINE

## 2017-11-25 PROCEDURE — 84112 EVAL AMNIOTIC FLUID PROTEIN: CPT | Performed by: FAMILY MEDICINE

## 2017-11-25 PROCEDURE — 0KQM0ZZ REPAIR PERINEUM MUSCLE, OPEN APPROACH: ICD-10-PCS | Performed by: FAMILY MEDICINE

## 2017-11-25 PROCEDURE — 25000128 H RX IP 250 OP 636: Performed by: FAMILY MEDICINE

## 2017-11-25 PROCEDURE — 00HU33Z INSERTION OF INFUSION DEVICE INTO SPINAL CANAL, PERCUTANEOUS APPROACH: ICD-10-PCS | Performed by: NURSE ANESTHETIST, CERTIFIED REGISTERED

## 2017-11-25 PROCEDURE — 86780 TREPONEMA PALLIDUM: CPT | Performed by: FAMILY MEDICINE

## 2017-11-25 RX ORDER — ONDANSETRON 2 MG/ML
4 INJECTION INTRAMUSCULAR; INTRAVENOUS EVERY 6 HOURS PRN
Status: DISCONTINUED | OUTPATIENT
Start: 2017-11-25 | End: 2017-11-26

## 2017-11-25 RX ORDER — ACETAMINOPHEN 325 MG/1
650 TABLET ORAL EVERY 4 HOURS PRN
Status: DISCONTINUED | OUTPATIENT
Start: 2017-11-25 | End: 2017-11-26

## 2017-11-25 RX ORDER — CARBOPROST TROMETHAMINE 250 UG/ML
250 INJECTION, SOLUTION INTRAMUSCULAR
Status: DISCONTINUED | OUTPATIENT
Start: 2017-11-25 | End: 2017-11-26

## 2017-11-25 RX ORDER — OXYTOCIN/0.9 % SODIUM CHLORIDE 30/500 ML
100-340 PLASTIC BAG, INJECTION (ML) INTRAVENOUS CONTINUOUS PRN
Status: DISCONTINUED | OUTPATIENT
Start: 2017-11-25 | End: 2017-11-26

## 2017-11-25 RX ORDER — ONDANSETRON 2 MG/ML
4 INJECTION INTRAMUSCULAR; INTRAVENOUS EVERY 6 HOURS PRN
Status: DISCONTINUED | OUTPATIENT
Start: 2017-11-25 | End: 2017-11-25

## 2017-11-25 RX ORDER — OXYTOCIN 10 [USP'U]/ML
10 INJECTION, SOLUTION INTRAMUSCULAR; INTRAVENOUS
Status: DISCONTINUED | OUTPATIENT
Start: 2017-11-25 | End: 2017-11-26

## 2017-11-25 RX ORDER — SODIUM CHLORIDE, SODIUM LACTATE, POTASSIUM CHLORIDE, CALCIUM CHLORIDE 600; 310; 30; 20 MG/100ML; MG/100ML; MG/100ML; MG/100ML
INJECTION, SOLUTION INTRAVENOUS CONTINUOUS
Status: DISCONTINUED | OUTPATIENT
Start: 2017-11-25 | End: 2017-11-26

## 2017-11-25 RX ORDER — EPHEDRINE SULFATE 50 MG/ML
INJECTION, SOLUTION INTRAMUSCULAR; INTRAVENOUS; SUBCUTANEOUS
Status: COMPLETED
Start: 2017-11-25 | End: 2017-11-26

## 2017-11-25 RX ORDER — OXYCODONE AND ACETAMINOPHEN 5; 325 MG/1; MG/1
1 TABLET ORAL
Status: DISCONTINUED | OUTPATIENT
Start: 2017-11-25 | End: 2017-11-26

## 2017-11-25 RX ORDER — METHYLERGONOVINE MALEATE 0.2 MG/ML
200 INJECTION INTRAVENOUS
Status: DISCONTINUED | OUTPATIENT
Start: 2017-11-25 | End: 2017-11-26

## 2017-11-25 RX ORDER — IBUPROFEN 800 MG/1
800 TABLET, FILM COATED ORAL
Status: COMPLETED | OUTPATIENT
Start: 2017-11-25 | End: 2017-11-26

## 2017-11-25 RX ORDER — NALOXONE HYDROCHLORIDE 0.4 MG/ML
.1-.4 INJECTION, SOLUTION INTRAMUSCULAR; INTRAVENOUS; SUBCUTANEOUS
Status: DISCONTINUED | OUTPATIENT
Start: 2017-11-25 | End: 2017-11-26

## 2017-11-25 RX ORDER — MISOPROSTOL 200 UG/1
TABLET ORAL
Status: DISCONTINUED
Start: 2017-11-25 | End: 2017-11-26 | Stop reason: WASHOUT

## 2017-11-25 RX ADMIN — SODIUM CHLORIDE, POTASSIUM CHLORIDE, SODIUM LACTATE AND CALCIUM CHLORIDE 100 ML/HR: 600; 310; 30; 20 INJECTION, SOLUTION INTRAVENOUS at 23:45

## 2017-11-25 RX ADMIN — SODIUM CHLORIDE, POTASSIUM CHLORIDE, SODIUM LACTATE AND CALCIUM CHLORIDE 500 ML: 600; 310; 30; 20 INJECTION, SOLUTION INTRAVENOUS at 23:30

## 2017-11-25 NOTE — IP AVS SNAPSHOT
[facility]    [address] [address]    Phone:  [phone]    Fax:  [phone]                                       After Visit Summary   11/25/2017    Rachel Villarreal    MRN: [id number]           After Visit Summary Signature Page     I have received my discharge instructions, and my questions have been answered. I have discussed any challenges I see with this plan with the nurse or doctor.    ..........................................................................................................................................  Patient/Patient Representative Signature      ..........................................................................................................................................  Patient Representative Print Name and Relationship to Patient    ..................................................               ................................................  Date                                            Time    ..........................................................................................................................................  Reviewed by Signature/Title    ...................................................              ..............................................  Date                                                            Time

## 2017-11-25 NOTE — IP AVS SNAPSHOT
MRN:[id number]                      After Visit Summary   11/25/2017    Rachel Villarreal    MRN: [id number]           Thank you!     Thank you for choosing [facility] for your care. Our goal is always to provide you with excellent care. Hearing back from our patients is one way we can continue to improve our services. Please take a few minutes to complete the written survey that you may receive in the mail after you visit with us. Thank you!        Patient Information     Date Of Birth          1985        Designated Caregiver       Most Recent Value    Caregiver    Will someone help with your care after discharge? no      About your hospital stay     You were admitted on:  November 25, 2017 You last received care in the:  [facility]    You were discharged on:  November 27, 2017       Who to Call     For medical emergencies, please call 911.  For non-urgent questions about your medical care, please call your primary care provider or clinic, [phone]          Attending Provider     Provider Specialty    Mary Rankin MD Family Practice       Primary Care Provider Office Phone # Fax #    Mary Rankin [phone]      Your next 10 appointments already scheduled     Jan 08, 2018  3:45 PM CST   (Arrive by 3:30 PM)   SHORT with Mary Rankin MD   [facility] ([facility] )    [address]  [facility] [address]   [phone]              Further instructions from your care team       Follow-up with Dr. Sim in 6 weeks for postpartum check.      Postpartum Vaginal Delivery Instructions    Activity    Ask family and friends for help when you need it.  Do not place anything in your vagina until approved by your Physician .  You are not restricted on other activities, but take it easy for a few weeks to allow your body to recover from delivery.  You are able to do any activities you feel up to that  point.  No driving until you have stopped taking narcotic pain medications.    Call your health care provider if you have any of these symptoms:    Increased pain, swelling, redness, or fluid around your stiches from an episiotomy or perineal tear.  A fever above 100.4 F (38 C) with or without chills when placing a thermometer under your tongue.  You soak a sanitary pad with blood within 1 hour, or you see blood clots larger than a golf ball.  Bleeding that lasts more than 6 weeks.  Vaginal discharge that smells bad.  Severe pain, cramping or tenderness in your lower belly area.  A need to urinate more frequently (use the toilet more often), more urgently (use the toilet very quickly), or it burns when you urinate.  Nausea and vomiting.  Redness, swelling or pain around a vein in your leg.  Problems breastfeeding or a red or painful area on your breast.  Chest pain and cough or are gasping for air.  Problems coping with sadness, anxiety, or depression.  If you have any concerns about hurting yourself or the baby, call your provider immediately. The Safe Place for Newborns law allows you to bring your baby to the hospital up to 7 days, no questions asked.  You have questions or concerns after you return home.    Keep your hands clean:  Always wash your hands before touching your perineal area and stitches.  This helps reduce your risk of infection.  If your hands aren't dirty, you may use an alcohol hand-rub to clean your hands. Keep your nails clean and short.      Pending Results     Date and Time Order Name Status Description    11/25/2017 1841 Anti Treponema In process             Statement of Approval     Ordered          11/27/17 0940  I have reviewed and agree with all the recommendations and orders detailed in this document.  EFFECTIVE NOW     Approved and electronically signed by:  Mary Rankin MD             Admission Information     Date & Time Provider Department Dept. Phone    11/25/2017   "Mary Landa MD [facility] [phone]      Your Vitals Were     Blood Pressure Pulse Temperature Respirations Last Period Pulse Oximetry    124/88 90 97.9  F (36.6  C) (Oral) 18 2017 98%      MyChart Information     [employer] lets you send messages to your doctor, view your test results, renew your prescriptions, schedule appointments and more. To sign up, go to [email or web address] . Click on \"Log in\" on the left side of the screen, which will take you to the Welcome page. Then click on \"Sign up Now\" on the right side of the page.     You will be asked to enter the access code listed below, as well as some personal information. Please follow the directions to create your username and password.     Your access code is: [email or web address]  Expires: 2017  2:58 PM     Your access code will  in 90 days. If you need help or a new code, please call your [facility] clinic or [phone].        Care EveryWhere ID     This is your Care EveryWhere ID. This could be used by other organizations to access your [facility] medical records  [id number]        Equal Access to Services     FRANKIE HAGAN AH: Nabeel Santiago, avi mo, stew marin, corrine katz. So [facility] [phone].    ATENCIÓN: Si habla español, tiene a davis disposición servicios gratuitos de asistencia lingüística. Llame al [phone].    We comply with applicable federal civil rights laws and [place] laws. We do not discriminate on the basis of race, color, national origin, age, disability, sex, sexual orientation, or gender identity.               Review of your medicines      START taking        Dose / Directions    acetaminophen 325 MG tablet   Commonly known as:  TYLENOL        Dose:  650 mg   Take 2 tablets (650 mg) by mouth every 4 hours as needed for mild pain or fever (greater than or equal to 38? C /100.4? F (oral) or 38.5? C/ 101.4? F (core).)   Quantity:  100 " tablet   Refills:  0       docusate sodium 100 MG capsule   Commonly known as:  COLACE        Dose:  100 mg   Take 1 capsule (100 mg) by mouth 2 times daily as needed for constipation   Quantity:  60 capsule   Refills:  0       ferrous sulfate 325 (65 FE) MG tablet   Commonly known as:  IRON        Dose:  325 mg   Take 1 tablet (325 mg) by mouth 2 times daily   Quantity:  60 tablet   Refills:  0       ibuprofen 800 MG tablet   Commonly known as:  ADVIL/MOTRIN        Dose:  800 mg   Take 1 tablet (800 mg) by mouth every 8 hours as needed for other (cramping)   Quantity:  90 tablet   Refills:  1       lanolin ointment        Apply topically every hour as needed for dry skin (sore nipples)   Quantity:  1 g   Refills:  0         CONTINUE these medicines which have NOT CHANGED        Dose / Directions    PRENATAL VITAMINS PO        Reported on 5/11/2017   Refills:  0       sertraline 50 MG tablet   Commonly known as:  ZOLOFT   Used for:  Mild episode of recurrent major depressive disorder (H)        Dose:  50 mg   Take 1 tablet (50 mg) by mouth daily   Quantity:  90 tablet   Refills:  0         STOP taking     acyclovir 400 MG tablet   Commonly known as:  ZOVIRAX           valACYclovir 500 MG tablet   Commonly known as:  VALTREX                Where to get your medicines      These medications were sent to [facility] [id number] [place] [address] [address] AT [facility] [address]  [address], [place] [address]     Phone:  [phone]     docusate sodium 100 MG capsule    ferrous sulfate 325 (65 FE) MG tablet    ibuprofen 800 MG tablet         Some of these will need a paper prescription and others can be bought over the counter. Ask your nurse if you have questions.     You don't need a prescription for these medications     acetaminophen 325 MG tablet    lanolin ointment                Protect others around you: Learn how to safely use, store and throw away your medicines at  www.disposemymeds.org.             Medication List: This is a list of all your medications and when to take them. Check marks below indicate your daily home schedule. Keep this list as a reference.      Medications           Morning Afternoon Evening Bedtime As Needed    acetaminophen 325 MG tablet   Commonly known as:  TYLENOL   Take 2 tablets (650 mg) by mouth every 4 hours as needed for mild pain or fever (greater than or equal to 38? C /100.4? F (oral) or 38.5? C/ 101.4? F (core).)                                docusate sodium 100 MG capsule   Commonly known as:  COLACE   Take 1 capsule (100 mg) by mouth 2 times daily as needed for constipation   Last time this was given:  100 mg on 11/27/2017  7:31 AM                                ferrous sulfate 325 (65 FE) MG tablet   Commonly known as:  IRON   Take 1 tablet (325 mg) by mouth 2 times daily                                ibuprofen 800 MG tablet   Commonly known as:  ADVIL/MOTRIN   Take 1 tablet (800 mg) by mouth every 8 hours as needed for other (cramping)   Last time this was given:  800 mg on 11/27/2017  7:30 AM                                lanolin ointment   Apply topically every hour as needed for dry skin (sore nipples)                                PRENATAL VITAMINS PO   Reported on 5/11/2017                                sertraline 50 MG tablet   Commonly known as:  ZOLOFT   Take 1 tablet (50 mg) by mouth daily                                          More Information        Understanding Postpartum Depression  You ve just had a baby. You expected to be excited and happy. But instead you find yourself crying for no reason. You may have trouble coping with your daily tasks. You feel sad, tired, and hopeless most of the time. You may even feel ashamed or guilty. But what you re going through is not your fault and you can feel better. Talk to your healthcare provider. He or she can help.    What is depression?  Depression is a mood disorder that  "affects the way you think and feel. The most common symptom is a feeling of deep sadness. You may also feel as if you just can t cope with life. Other symptoms include:    Gaining or losing a lot of weight    Sleeping too much or too little    Feeling tired all the time    Feeling restless    Crying a lot    Having too little or too much appetite.    Withdrawing from friends and family    Having headaches, aches and pains, or stomach problems that won't go away.    Fears of harming your baby    Lack of interest in your baby    Feeling worthless or guilty    No longer finding pleasure in things you used to    Having trouble thinking clearly or making decisions    Thinking about death or suicide   Depression after childbirth  You may be weepy and tired right after giving birth. These feelings are normal. They re sometimes called the  baby blues.  These blues go away after 1 to 2 weeks. However, postpartum (meaning  after birth ) depression lasts much longer and is more severe than the \"baby blues.\" It can make you feel sad and hopeless. You may also fear that your baby will be harmed and worry about being a bad mother.  What causes postpartum depression?  The exact cause of postpartum depression is unknown. Changes in brain chemistry or structure are believed to play a big role in depression. It may be due to changes in your hormones during and after childbirth. You may also be tired from caring for your baby and adjusting to being a mother. All these factors may make you feel depressed. In some cases, your genes may also play a role.  Depression can be treated  There are many ways to treat postpartum depression. Talking to your healthcare provider is the first step toward feeling better.  When to call your healthcare provider  Call your healthcare provider if you:     Cry for no clear reason    Have trouble sleeping, eating, and making choices    Questions whether you can handle caring for a baby    Have intense " feelings of sadness, anxiety, or despair that prevent you from being able to do your daily tasks  Resources    National [place] of Mental Uwminy575-794-2446ngv.[facility].nih.gov    National [place] on Mental Hsexqkn498-058-3518qhn.[facility].org    Mental Health Gntbcwe828-056-3011mkx.[facility].org    National Suicide Gcyxgfu381-120-5134 (800-SUICIDE)   Date Last Reviewed: 8/16/2015 2000-2017 [employer]. [address] [place] [address]. All rights reserved. This information is not intended as a substitute for professional medical care. Always follow your healthcare professional's instructions.

## 2017-11-25 NOTE — TELEPHONE ENCOUNTER
"  Reason for Disposition    Leakage of fluid from vagina    Additional Information    Negative: [1] SEVERE abdominal pain (e.g., excruciating) AND [2] constant AND [3] present > 1 hour    Negative: Severe bleeding (e.g., continuous red blood from vagina, or large blood clots)    Negative: Umbilical cord hanging out of the vagina (shiny, white, curled appearance, \"like telephone cord\")    Negative: Uncontrollable urge to push (i.e., feels like baby is coming out now)    Negative: Can see baby    Negative: Sounds like a life-threatening emergency to the triager    Negative: < 20 weeks pregnant    Negative: Vaginal bleeding    Answer Assessment - Initial Assessment Questions  1. ONSET: \"When did the symptoms begin?\"         Yesterday 11/24/17  2. CONTRACTIONS: \"Are you having any contractions?\" If yes, ask: \"Describe the contractions that you are having.\" (e.g., duration, frequency, regularity, severity)      No contractions  3. MIKE: \"What date are you expecting to deliver?\"      12/03/17  4. PARITY: \"Have you had a baby before?\" If yes, \"How long did the labor last?\"      First child  5. FETAL MOVEMENT: \"Has the baby's movement decreased or changed significantly from normal?\"      normal  6. OTHER SYMPTOMS: \"Do you have any other symptoms?\" (e.g., abdominal pain, vaginal bleeding, fever, hand/facial swelling)      No other symptoms    Protocols used: PREGNANCY - RUPTURE OF MEMBRANES-ADULT-[facility] on call Dr. Allen via [facility] page  at 9:30 am, to call Rachel back at [phone] regarding leakage of fluid and not sure if possible rupture of membranes, PCP is [facility]. Page  took triage name and number and also took patient number and will try and have Dr. Allen call patient directly.     Jaja Concepcion, RN, BSN  [facility]    "

## 2017-11-26 ENCOUNTER — ANESTHESIA EVENT (OUTPATIENT)
Dept: OBGYN | Facility: HOSPITAL | Age: 32
End: 2017-11-26
Payer: COMMERCIAL

## 2017-11-26 ENCOUNTER — ANESTHESIA (OUTPATIENT)
Dept: OBGYN | Facility: HOSPITAL | Age: 32
End: 2017-11-26
Payer: COMMERCIAL

## 2017-11-26 PROCEDURE — 25000128 H RX IP 250 OP 636: Performed by: NURSE ANESTHETIST, CERTIFIED REGISTERED

## 2017-11-26 PROCEDURE — 25000125 ZZHC RX 250: Performed by: FAMILY MEDICINE

## 2017-11-26 PROCEDURE — 25000125 ZZHC RX 250: Performed by: NURSE ANESTHETIST, CERTIFIED REGISTERED

## 2017-11-26 PROCEDURE — 25000125 ZZHC RX 250

## 2017-11-26 PROCEDURE — 25000132 ZZH RX MED GY IP 250 OP 250 PS 637: Performed by: FAMILY MEDICINE

## 2017-11-26 PROCEDURE — 25000128 H RX IP 250 OP 636: Performed by: FAMILY MEDICINE

## 2017-11-26 PROCEDURE — 59400 OBSTETRICAL CARE: CPT | Performed by: FAMILY MEDICINE

## 2017-11-26 PROCEDURE — 37000011 ZZH ANESTHESIA WARD SERVICE: Performed by: NURSE ANESTHETIST, CERTIFIED REGISTERED

## 2017-11-26 PROCEDURE — 12000027 ZZH R&B OB

## 2017-11-26 RX ORDER — LIDOCAINE HYDROCHLORIDE 10 MG/ML
INJECTION, SOLUTION EPIDURAL; INFILTRATION; INTRACAUDAL; PERINEURAL PRN
Status: DISCONTINUED | OUTPATIENT
Start: 2017-11-26 | End: 2017-11-26

## 2017-11-26 RX ORDER — NALOXONE HYDROCHLORIDE 0.4 MG/ML
.1-.4 INJECTION, SOLUTION INTRAMUSCULAR; INTRAVENOUS; SUBCUTANEOUS
Status: DISCONTINUED | OUTPATIENT
Start: 2017-11-26 | End: 2017-11-27 | Stop reason: HOSPADM

## 2017-11-26 RX ORDER — OXYTOCIN 10 [USP'U]/ML
10 INJECTION, SOLUTION INTRAMUSCULAR; INTRAVENOUS
Status: DISCONTINUED | OUTPATIENT
Start: 2017-11-26 | End: 2017-11-27 | Stop reason: HOSPADM

## 2017-11-26 RX ORDER — SODIUM CHLORIDE, SODIUM LACTATE, POTASSIUM CHLORIDE, CALCIUM CHLORIDE 600; 310; 30; 20 MG/100ML; MG/100ML; MG/100ML; MG/100ML
INJECTION, SOLUTION INTRAVENOUS CONTINUOUS
Status: DISCONTINUED | OUTPATIENT
Start: 2017-11-25 | End: 2017-11-26

## 2017-11-26 RX ORDER — BISACODYL 10 MG
10 SUPPOSITORY, RECTAL RECTAL DAILY PRN
Status: DISCONTINUED | OUTPATIENT
Start: 2017-11-28 | End: 2017-11-27 | Stop reason: HOSPADM

## 2017-11-26 RX ORDER — MAGNESIUM CARB/ALUMINUM HYDROX 105-160MG
TABLET,CHEWABLE ORAL
Status: DISPENSED
Start: 2017-11-26 | End: 2017-11-26

## 2017-11-26 RX ORDER — IBUPROFEN 800 MG/1
800 TABLET, FILM COATED ORAL EVERY 6 HOURS PRN
Status: DISCONTINUED | OUTPATIENT
Start: 2017-11-26 | End: 2017-11-27 | Stop reason: HOSPADM

## 2017-11-26 RX ORDER — OXYTOCIN/0.9 % SODIUM CHLORIDE 30/500 ML
1-24 PLASTIC BAG, INJECTION (ML) INTRAVENOUS CONTINUOUS
Status: DISCONTINUED | OUTPATIENT
Start: 2017-11-26 | End: 2017-11-26

## 2017-11-26 RX ORDER — LIDOCAINE 40 MG/G
CREAM TOPICAL
Status: DISCONTINUED | OUTPATIENT
Start: 2017-11-26 | End: 2017-11-26

## 2017-11-26 RX ORDER — ONDANSETRON 4 MG/1
4 TABLET, ORALLY DISINTEGRATING ORAL EVERY 6 HOURS PRN
Status: DISCONTINUED | OUTPATIENT
Start: 2017-11-26 | End: 2017-11-26

## 2017-11-26 RX ORDER — LIDOCAINE HYDROCHLORIDE AND EPINEPHRINE 15; 5 MG/ML; UG/ML
INJECTION, SOLUTION EPIDURAL PRN
Status: DISCONTINUED | OUTPATIENT
Start: 2017-11-26 | End: 2017-11-26

## 2017-11-26 RX ORDER — SODIUM CHLORIDE, SODIUM LACTATE, POTASSIUM CHLORIDE, CALCIUM CHLORIDE 600; 310; 30; 20 MG/100ML; MG/100ML; MG/100ML; MG/100ML
INJECTION, SOLUTION INTRAVENOUS CONTINUOUS
Status: DISCONTINUED | OUTPATIENT
Start: 2017-11-26 | End: 2017-11-26

## 2017-11-26 RX ORDER — NALOXONE HYDROCHLORIDE 0.4 MG/ML
.1-.4 INJECTION, SOLUTION INTRAMUSCULAR; INTRAVENOUS; SUBCUTANEOUS
Status: DISCONTINUED | OUTPATIENT
Start: 2017-11-26 | End: 2017-11-26

## 2017-11-26 RX ORDER — NALBUPHINE HYDROCHLORIDE 20 MG/ML
2.5-5 INJECTION, SOLUTION INTRAMUSCULAR; INTRAVENOUS; SUBCUTANEOUS EVERY 6 HOURS PRN
Status: DISCONTINUED | OUTPATIENT
Start: 2017-11-26 | End: 2017-11-26

## 2017-11-26 RX ORDER — OXYTOCIN/0.9 % SODIUM CHLORIDE 30/500 ML
340 PLASTIC BAG, INJECTION (ML) INTRAVENOUS CONTINUOUS PRN
Status: DISCONTINUED | OUTPATIENT
Start: 2017-11-26 | End: 2017-11-27 | Stop reason: HOSPADM

## 2017-11-26 RX ORDER — MISOPROSTOL 200 UG/1
400 TABLET ORAL
Status: DISCONTINUED | OUTPATIENT
Start: 2017-11-26 | End: 2017-11-27 | Stop reason: HOSPADM

## 2017-11-26 RX ORDER — ONDANSETRON 2 MG/ML
4 INJECTION INTRAMUSCULAR; INTRAVENOUS EVERY 6 HOURS PRN
Status: DISCONTINUED | OUTPATIENT
Start: 2017-11-26 | End: 2017-11-26

## 2017-11-26 RX ORDER — IBUPROFEN 600 MG/1
600 TABLET, FILM COATED ORAL EVERY 6 HOURS PRN
Status: DISCONTINUED | OUTPATIENT
Start: 2017-11-26 | End: 2017-11-27 | Stop reason: HOSPADM

## 2017-11-26 RX ORDER — HYDROCODONE BITARTRATE AND ACETAMINOPHEN 5; 325 MG/1; MG/1
1 TABLET ORAL EVERY 4 HOURS PRN
Status: DISCONTINUED | OUTPATIENT
Start: 2017-11-26 | End: 2017-11-27 | Stop reason: HOSPADM

## 2017-11-26 RX ORDER — OXYTOCIN/0.9 % SODIUM CHLORIDE 30/500 ML
100 PLASTIC BAG, INJECTION (ML) INTRAVENOUS CONTINUOUS
Status: DISCONTINUED | OUTPATIENT
Start: 2017-11-26 | End: 2017-11-27 | Stop reason: HOSPADM

## 2017-11-26 RX ORDER — IBUPROFEN 400 MG/1
400 TABLET, FILM COATED ORAL EVERY 6 HOURS PRN
Status: DISCONTINUED | OUTPATIENT
Start: 2017-11-26 | End: 2017-11-27 | Stop reason: HOSPADM

## 2017-11-26 RX ORDER — TERBUTALINE SULFATE 1 MG/ML
0.25 INJECTION, SOLUTION SUBCUTANEOUS
Status: DISCONTINUED | OUTPATIENT
Start: 2017-11-26 | End: 2017-11-26

## 2017-11-26 RX ORDER — DOCUSATE SODIUM 100 MG/1
100 CAPSULE, LIQUID FILLED ORAL 2 TIMES DAILY
Status: DISCONTINUED | OUTPATIENT
Start: 2017-11-26 | End: 2017-11-27 | Stop reason: HOSPADM

## 2017-11-26 RX ORDER — EPHEDRINE SULFATE 50 MG/ML
5 INJECTION, SOLUTION INTRAMUSCULAR; INTRAVENOUS; SUBCUTANEOUS
Status: DISCONTINUED | OUTPATIENT
Start: 2017-11-26 | End: 2017-11-26

## 2017-11-26 RX ORDER — HYDROCORTISONE 2.5 %
CREAM (GRAM) TOPICAL 3 TIMES DAILY PRN
Status: DISCONTINUED | OUTPATIENT
Start: 2017-11-26 | End: 2017-11-27 | Stop reason: HOSPADM

## 2017-11-26 RX ORDER — LANOLIN 100 %
OINTMENT (GRAM) TOPICAL
Status: DISCONTINUED | OUTPATIENT
Start: 2017-11-26 | End: 2017-11-27 | Stop reason: HOSPADM

## 2017-11-26 RX ORDER — ACETAMINOPHEN 325 MG/1
650 TABLET ORAL EVERY 4 HOURS PRN
Status: DISCONTINUED | OUTPATIENT
Start: 2017-11-26 | End: 2017-11-27 | Stop reason: HOSPADM

## 2017-11-26 RX ADMIN — Medication 5 MG: at 01:55

## 2017-11-26 RX ADMIN — Medication 8 ML/HR: at 01:31

## 2017-11-26 RX ADMIN — LIDOCAINE HYDROCHLORIDE 20 ML: 10 INJECTION, SOLUTION INFILTRATION; PERINEURAL at 04:55

## 2017-11-26 RX ADMIN — Medication 2 ML: at 01:19

## 2017-11-26 RX ADMIN — DOCUSATE SODIUM 100 MG: 100 CAPSULE, LIQUID FILLED ORAL at 20:55

## 2017-11-26 RX ADMIN — SODIUM CHLORIDE, POTASSIUM CHLORIDE, SODIUM LACTATE AND CALCIUM CHLORIDE 1000 ML: 600; 310; 30; 20 INJECTION, SOLUTION INTRAVENOUS at 00:38

## 2017-11-26 RX ADMIN — IBUPROFEN 800 MG: 800 TABLET ORAL at 05:09

## 2017-11-26 RX ADMIN — LIDOCAINE HYDROCHLORIDE 3 ML: 10 INJECTION, SOLUTION EPIDURAL; INFILTRATION; INTRACAUDAL; PERINEURAL at 01:19

## 2017-11-26 RX ADMIN — Medication 3 ML: at 01:17

## 2017-11-26 RX ADMIN — EPHEDRINE SULFATE 5 MG: 50 INJECTION, SOLUTION INTRAMUSCULAR; INTRAVENOUS; SUBCUTANEOUS at 02:03

## 2017-11-26 RX ADMIN — IBUPROFEN 800 MG: 800 TABLET ORAL at 17:43

## 2017-11-26 RX ADMIN — OXYTOCIN-SODIUM CHLORIDE 0.9% IV SOLN 30 UNIT/500ML 2 MILLI-UNITS/MIN: 30-0.9/5 SOLUTION at 04:08

## 2017-11-26 RX ADMIN — EPHEDRINE SULFATE 5 MG: 50 INJECTION, SOLUTION INTRAMUSCULAR; INTRAVENOUS; SUBCUTANEOUS at 01:55

## 2017-11-26 RX ADMIN — DOCUSATE SODIUM 100 MG: 100 CAPSULE, LIQUID FILLED ORAL at 08:29

## 2017-11-26 RX ADMIN — IBUPROFEN 400 MG: 400 TABLET ORAL at 11:47

## 2017-11-26 NOTE — PLAN OF CARE
OB Triage Note  Rachel Villarreal  MRN: [id number]  Gestational Age: 38w6d      Rachel Villarreal presents for rule out rupture of membranes.     Denies contractions.  Rates pain at 0/10.  Reports LOF.  Reports small clear amount. Says she feels she started leaking last night around 8pm. Amnisure postive.     Dr. Rankin notified of arrival and condition.  Oriented patient to surroundings. Call light within reach.     FHT: 140  NST Start Time:1810  NST Stop Time:1830  NST: Reactive .  Uterine Assessment: no contractions    SVE: 3, 40%, -3

## 2017-11-26 NOTE — PLAN OF CARE
Problem: Postpartum (Vaginal Delivery) (Adult,Obstetrics,Pediatric)  Goal: Signs and Symptoms of Listed Potential Problems Will be Absent, Minimized or Managed (Postpartum)  Signs and symptoms of listed potential problems will be absent, minimized or managed by discharge/transition of care (reference Postpartum (Vaginal Delivery) (Adult,Obstetrics,Pediatric) CPG).   Outcome: Improving  Assessments completed as charted. B/P: 135/75, T: 98.5, P: 94, R: 16. Rates pain: 0/10. Up to bathroom with standby assist. Voiding without difficulty. Fundus: Midline firm. Lochia: Light. Activity: unrestricted with out pain . Infant feeding: Breast feeding going well.     LATCH Score:   Latch: 2 - Good Latch  Audible Swallowin - Spontaneous & frequent  Type of Nipple: (Breast/Nipple) 2 - Everted  Comfort: 2 - Soft, Nontender  Hold: 2 - No Assist   Total LATCH Score: 10    Postpartum breastfeeding assessment completed and education provided, see Patient Education Activity.  Items included in the education are:     proper positioning and latch    effectiveness of feeding    manual expression    handling and storing breastmilk    maintenance of breastfeeding for the first 6 months    sign/symptoms of infant feeding issues requiring referral to qualified health care provider  Postpartum care education provided, see Patient Education activity. Patient denies needs. Will monitor.  Mayela Nguyen

## 2017-11-26 NOTE — ANESTHESIA PROCEDURE NOTES
Peripheral nerve/Neuraxial procedure note : epidural catheter  Pre-Procedure  Performed by   Referred by DR. LUGO  Location: OB    Procedure Times:11/26/2017 1:00 AM and 11/26/2017 1:38 AM  Pre-Anesthestic Checklist: patient identified, IV checked, site marked, risks and benefits discussed, informed consent, monitors and equipment checked, pre-op evaluation, at physician/surgeon's request and post-op pain management    Timeout  Correct Patient: Yes   Correct Procedure: Yes   Correct Site: Yes   Correct Laterality: N/A   Correct Position: Yes   Site Marked: N/A   .   Procedure Documentation    Diagnosis:labor pain.    Procedure:    Epidural catheter.  Insertion Site:L3-4  (midline approach) Injection technique: LORT air   Local skin infiltrated with 2 mL of 1% lidocaine.  SHELLY at 7 cm     Patient Prep;chlorhexidine gluconate and isopropyl alcohol.  .  Needle: Touhy needle Needle Gauge: 20.    Needle Length (Inches) 3.5  # of attempts: 1 and  # of redirects:  1 .   Catheter: 18 G . .  Catheter threaded easily  5 cm epidural space.  12 cm at skin.   .    Assessment/Narrative  Paresthesias: Resolved.  .  .  Aspiration negative for heme or CSF  . Test dose of 3 mL lidocaine 1.5% w/ 1:200,000 epinephrine at 01:17.  Test dose negative for signs of intravascular, subdural or intrathecal injection. Comments:  Prior to epidural, patient rated pain 10 out of 10 with contractions.  After test dose and bolus, patient rates pain a 7 out of 10.  Her right leg feels heavy and her left leg is starting to feel heavy.

## 2017-11-26 NOTE — ANESTHESIA PREPROCEDURE EVALUATION
Anesthesia Evaluation       history and physical reviewed .             ROS/MED HX    ENT/Pulmonary:  - neg pulmonary ROS     Neurologic:  - neg neurologic ROS     Cardiovascular:  - neg cardiovascular ROS       METS/Exercise Tolerance:     Hematologic:         Musculoskeletal:         GI/Hepatic:  - neg GI/hepatic ROS       Renal/Genitourinary:         Endo:         Psychiatric:         Infectious Disease:         Malignancy:         Other:                     Physical Exam  Normal systems: cardiovascular, pulmonary and dental    Airway   Mallampati: II  TM distance: > 3 FB  Neck ROM: full  Mouth opening: > 3 cm    Dental     Cardiovascular       Pulmonary           neg OB ROS                 Anesthesia Plan      History & Physical Review      ASA Status:  2 .  OB Epidural Asa: 2       Plan for     Discussed risks and benefits with patient including itching, sore back, infection, hematoma, spinal headache, CV complications, inability to place, nerve damage. Pt wishes to proceed.         Postoperative Care      Consents                          .

## 2017-11-26 NOTE — PROGRESS NOTES
Patient 10/100%/+1.  Very comfortable, not feeling contractions.  Will try more coaching with nursing vs contacting anesthesia to decrease epidural.    Mary Rankin MD

## 2017-11-26 NOTE — PROGRESS NOTES
Low dose pitocin started to help contractions.  Pushing well, expectant management.    Mary Rankin MD

## 2017-11-26 NOTE — ANESTHESIA POSTPROCEDURE EVALUATION
Patient: Rachel Villarreal    * No procedures listed *    Diagnosis:* No pre-op diagnosis entered *  Diagnosis Additional Information: No value filed.    Anesthesia Type:  No value filed.    Note:  Anesthesia Post Evaluation    Patient location during evaluation: Floor  Patient participation: Able to fully participate in evaluation  Level of consciousness: awake and alert  Pain management: adequate  Airway patency: patent  Cardiovascular status: acceptable  Respiratory status: acceptable  Hydration status: acceptable  PONV: none     Anesthetic complications: None          Last vitals:  Vitals:    11/26/17 0659 11/26/17 0716 11/26/17 0800   BP: 119/78 115/75 (P) 135/75   Pulse:      Resp:   (P) 16   Temp:   (P) 98.5  F (36.9  C)   SpO2:            Electronically Signed By: RAVINDRA Muñoz CRNA  November 26, 2017  8:28 AM

## 2017-11-26 NOTE — PLAN OF CARE
Labor Admission  Rachel Villarreal  MRN: [id number]  Gestational Age: 38w6d      Rachel Villarreal is admitted for active labor.  States is not kemar today.  Rates pain at o/10.  Reports LOF.  Reports scant  clear fluid seen.    Dr Rankin notified of arrival and condition and Intrapartum orders initiated.      FHT: 140  NST: Reactive .  Uterine Assessment: few contractions noted on fetal strip. Patient not feeling     Patient is alert and oriented X 3,Patient oriented to room, unit, hourly rounding, and plan of care.  Call light within reach. Explained admission packet with patient bill of rights brochure. Will continue to monitor and document as needed.     Inpatient nursing criteria listed below was met:    Health care directives status obtained and documented: Yes  Patient identifies a surrogate decision maker: Yes   If yes, who:Valente Contact Information:[phone]  Core Measure diagnosis present:: No  Vaccine assessment done and vaccines ordered if appropriate. Yes  Clergy visit ordered if patient requests: N/A  Skin issues/needs documented:N/A  Isolation needs addressed, if appropriate: N/A  Fall Prevention (Med and High risk): Care plan updated, Education given and documented and signage used: N/A  Care Plan initiated: Yes  Education Documented (Reminder to educate patient if MRSA is present on admission): Not applicable  Education Assessment documented:Yes  Patient has discharge needs (If yes, please explain): Yes

## 2017-11-26 NOTE — L&D DELIVERY NOTE
of 7 lb 4 oz. female infant over torn perineum with Apgars of 9 at one minute and 9 at five minutes.  Nuchal cord was present, loose and reduced at perineum.  Spontaneous delivery of intact placenta with 3 vessel cord.  Pitocin was run in IV after delivery of infant and firming of the uterus was noted.  EBL was 200 ml.  2nd degree perineal tear was repaired in usual fashion without difficulty, and superficial right labial tear was repaired with 3-0 vicryl as well.  All counts were correct.  Mom and baby doing well.  Rylee Teresa [place], MD

## 2017-11-26 NOTE — PLAN OF CARE
Face to face report given with opportunity to observe patient.    Report given to Elise Coffman   11/26/2017  7:12 AM  ;

## 2017-11-26 NOTE — H&P
Labor and Delivery History and Physical    Rachel Villarreal MRN# [id number]   Age: 32 year old YOB: 1985     Date of Admission:  2017    Primary care provider: Mary Rankin           Chief Complaint:   Rachel Villarreal is a 32 year old female who is 38w6d pregnant and being admitted for SROM.    Patient started to leak a small amount of fluid last night (maybe around ).  She notes she had two small gushes and had to change her underwear twice overnight.  She notes the leaking seemed to stop this morning, so she didn't come in.  Tonight, she had another gush, so she came in for evaluation.  Amni-sure was positive.            Pregnancy history:     OBSTETRIC HISTORY:    Obstetric History       T1      L0     SAB0   TAB0   Ectopic0   Multiple0   Live Births1       # Outcome Date GA Lbr Sharif/2nd Weight Sex Delivery Anes PTL Lv   2 Current            1 Term 12 40w0d  3.204 kg (7 lb 1 oz) F Vag-Spont EPI N LIVE BIRTH      Name: Matt          EDC: Estimated Date of Delivery: Dec 3, 2017    Prenatal Labs:   Lab Results   Component Value Date    ABO A 2017    RH Pos 2017    AS Neg 2017    HEPBANG Nonreactive 2017    CHPCRT  2017     Negative   Negative for C. trachomatis rRNA by transcription mediated amplification.   A negative result by transcription mediated amplification does not preclude the   presence of C. trachomatis infection because results are dependent on proper   and adequate collection, absence of inhibitors, and sufficient rRNA to be   detected.      GCPCRT  2017     Negative   Negative for N. gonorrhoeae rRNA by transcription mediated amplification.   A negative result by transcription mediated amplification does not preclude the   presence of N. gonorrhoeae infection because results are dependent on proper   and adequate collection, absence of inhibitors, and sufficient rRNA to be   detected.      TREPAB Negative  05/11/2017    RUBELLAABIGG 24     IU/mL     See Annotation 09/08/2011    HGB 10.9 (L) 11/25/2017       GBS Status:   Lab Results   Component Value Date    GBS Negative 11/10/2017       Active Problem List  Patient Active Problem List   Diagnosis     ACP (advance care planning)     Mild episode of recurrent major depressive disorder (H)     Advance care planning     Supervision of other normal pregnancy, antepartum     Herpes simplex infection of genitourinary system     Encounter for triage in pregnant patient     Normal labor and delivery       Medication Prior to Admission  Prescriptions Prior to Admission   Medication Sig Dispense Refill Last Dose     acyclovir (ZOVIRAX) 400 MG tablet Take 1 tablet (400 mg) by mouth 2 times daily 60 tablet 2 11/24/2017 at Unknown time     sertraline (ZOLOFT) 50 MG tablet Take 1 tablet (50 mg) by mouth daily 90 tablet 0 11/24/2017 at Unknown time     Prenatal Multivit-Min-Fe-FA (PRENATAL VITAMINS PO) Reported on 5/11/2017   More than a month at Unknown time     valACYclovir (VALTREX) 500 MG tablet Take 500 mg by mouth daily as needed Reported on 5/11/2017   More than a month at Unknown time   .        Maternal Past Medical History:     Past Medical History:   Diagnosis Date     Herpes simplex infection of genitourinary system 5/11/2017     Mild episode of recurrent major depressive disorder (H) 9/6/2016                       Family History:     Family History   Problem Relation Age of Onset     Hypertension Mother      Other Cancer Maternal Grandfather      DIABETES Paternal Grandfather      Family history reviewed and updated in [facility]            Social History:     Social History   Substance Use Topics     Smoking status: Never Smoker     Smokeless tobacco: Never Used     Alcohol use No            Review of Systems:   A comprehensive review of systems was performed and found to be negative except as described in this note          Physical Exam:   Vitals were reviewed  Temp: 98.2   F (36.8  C) Temp src: Oral BP: 132/94 Pulse: 98 Heart Rate: 98 Resp: 20 SpO2: 98 %        Constitutional:   awake, alert, cooperative, no apparent distress, and appears stated age     Eyes:   pupils equal, round and reactive to light     Neck:   supple, symmetrical, trachea midline     Lungs:   No increased work of breathing, good air exchange, clear to auscultation bilaterally, no crackles or wheezing     Cardiovascular:   regular rate and rhythm and normal S1 and S2     Abdomen:   Vtx by [place]                  Cervix:   Membranes: SROM but additional forebag ruptured with clear fluid   Dilation: 3.5   Effacement: 50%   Station:-3   Consistency: soft   Position: Mid  Presentation:Cephalic  Fetal Heart Rate Tracing: reactive and reassuring  Tocometer: external monitor                       Assessment:   Rachel Villarreal is a 38w6d pregnant female admitted with active labor management.          Plan:   Admit - see IP orders  Anticipate Kwan Rnakin MD

## 2017-11-27 VITALS
OXYGEN SATURATION: 98 % | DIASTOLIC BLOOD PRESSURE: 88 MMHG | RESPIRATION RATE: 18 BRPM | HEART RATE: 90 BPM | SYSTOLIC BLOOD PRESSURE: 124 MMHG | TEMPERATURE: 97.9 F

## 2017-11-27 LAB — HGB BLD-MCNC: 8.9 G/DL (ref 11.7–15.7)

## 2017-11-27 PROCEDURE — 72200001 ZZH LABOR CARE VAGINAL DELIVERY SINGLE

## 2017-11-27 PROCEDURE — 85018 HEMOGLOBIN: CPT | Performed by: FAMILY MEDICINE

## 2017-11-27 PROCEDURE — 99215 OFFICE O/P EST HI 40 MIN: CPT

## 2017-11-27 PROCEDURE — 25000132 ZZH RX MED GY IP 250 OP 250 PS 637: Performed by: FAMILY MEDICINE

## 2017-11-27 PROCEDURE — 36415 COLL VENOUS BLD VENIPUNCTURE: CPT | Performed by: FAMILY MEDICINE

## 2017-11-27 RX ORDER — ACETAMINOPHEN 325 MG/1
650 TABLET ORAL EVERY 4 HOURS PRN
Qty: 100 TABLET | Refills: 0 | COMMUNITY
Start: 2017-11-27 | End: 2018-01-08

## 2017-11-27 RX ORDER — FERROUS SULFATE 325(65) MG
325 TABLET ORAL 2 TIMES DAILY
Qty: 60 TABLET | Refills: 0 | Status: SHIPPED | OUTPATIENT
Start: 2017-11-27 | End: 2018-01-08

## 2017-11-27 RX ORDER — IBUPROFEN 800 MG/1
800 TABLET, FILM COATED ORAL EVERY 8 HOURS PRN
Qty: 90 TABLET | Refills: 1 | Status: SHIPPED | OUTPATIENT
Start: 2017-11-27 | End: 2019-04-02

## 2017-11-27 RX ORDER — DOCUSATE SODIUM 100 MG/1
100 CAPSULE, LIQUID FILLED ORAL 2 TIMES DAILY PRN
Qty: 60 CAPSULE | Refills: 0 | Status: SHIPPED | OUTPATIENT
Start: 2017-11-27 | End: 2018-01-08

## 2017-11-27 RX ORDER — LANOLIN 100 %
OINTMENT (GRAM) TOPICAL
Qty: 1 G | Refills: 0 | COMMUNITY
Start: 2017-11-27 | End: 2018-01-08

## 2017-11-27 RX ADMIN — IBUPROFEN 800 MG: 800 TABLET ORAL at 07:30

## 2017-11-27 RX ADMIN — DOCUSATE SODIUM 100 MG: 100 CAPSULE, LIQUID FILLED ORAL at 07:31

## 2017-11-27 NOTE — PLAN OF CARE
Problem: Postpartum (Vaginal Delivery) (Adult,Obstetrics,Pediatric)  Goal: Signs and Symptoms of Listed Potential Problems Will be Absent, Minimized or Managed (Postpartum)  Signs and symptoms of listed potential problems will be absent, minimized or managed by discharge/transition of care (reference Postpartum (Vaginal Delivery) (Adult,Obstetrics,Pediatric) CPG).   Outcome: Improving  Assessments completed as charted. B/P: 124/88, T: 97.9, P: 90, R: 18. Rates pain: 4/10 lower abdomen/back. Voiding without difficulty. Fundus: Midline/Firm. Lochia: Light. Activity: unrestricted with out pain . Infant feeding: Formula. Baby in room with mother and father.  Formula feeding without difficulty-no questions or concerns.    sign/symptoms of infant feeding issues requiring referral to qualified health care provider  Postpartum care education provided, see Patient Education activity. Patient denies needs. Will monitor.  Rachel Rocha

## 2017-11-27 NOTE — DISCHARGE INSTRUCTIONS
Follow-up with Dr. Sim in 6 weeks for postpartum check.      Postpartum Vaginal Delivery Instructions    Activity    Ask family and friends for help when you need it.  Do not place anything in your vagina until approved by your Physician .  You are not restricted on other activities, but take it easy for a few weeks to allow your body to recover from delivery.  You are able to do any activities you feel up to that point.  No driving until you have stopped taking narcotic pain medications.    Call your health care provider if you have any of these symptoms:    Increased pain, swelling, redness, or fluid around your stiches from an episiotomy or perineal tear.  A fever above 100.4 F (38 C) with or without chills when placing a thermometer under your tongue.  You soak a sanitary pad with blood within 1 hour, or you see blood clots larger than a golf ball.  Bleeding that lasts more than 6 weeks.  Vaginal discharge that smells bad.  Severe pain, cramping or tenderness in your lower belly area.  A need to urinate more frequently (use the toilet more often), more urgently (use the toilet very quickly), or it burns when you urinate.  Nausea and vomiting.  Redness, swelling or pain around a vein in your leg.  Problems breastfeeding or a red or painful area on your breast.  Chest pain and cough or are gasping for air.  Problems coping with sadness, anxiety, or depression.  If you have any concerns about hurting yourself or the baby, call your provider immediately. The Safe Place for Newborns law allows you to bring your baby to the hospital up to 7 days, no questions asked.  You have questions or concerns after you return home.    Keep your hands clean:  Always wash your hands before touching your perineal area and stitches.  This helps reduce your risk of infection.  If your hands aren't dirty, you may use an alcohol hand-rub to clean your hands. Keep your nails clean and short.

## 2017-11-27 NOTE — PLAN OF CARE
Problem: Postpartum (Vaginal Delivery) (Adult,Obstetrics,Pediatric)  Goal: Signs and Symptoms of Listed Potential Problems Will be Absent, Minimized or Managed (Postpartum)  Signs and symptoms of listed potential problems will be absent, minimized or managed by discharge/transition of care (reference Postpartum (Vaginal Delivery) (Adult,Obstetrics,Pediatric) CPG).   Outcome: Improving  Assessments completed as charted. B/P: 107/62, T: 98.2, P: 88, R: 16. Rates pain: 0/10 . Voiding without difficulty. Fundus: Midline U/1. Lochia: Light. Activity: unrestricted with out pain . Infant feeding: switched to Formula per Mother request.     Postpartum care education provided, see Patient Education activity. Patient denies needs. Will monitor.  Georgina Coffman

## 2017-11-27 NOTE — PLAN OF CARE
Face to face report given with opportunity to observe patient.    Report given to Isatu Nguyen   11/26/2017  7:10 PM

## 2017-11-27 NOTE — PROGRESS NOTES
Patient:   Rachel  FOB: Valente; Recognition of Parentage notarized at this time with both parents  Marital status: single    Lives at: home in [place]  Lives with: Valente and another child  Support System: family and friends    Primary PCP:  Mary Rankin  OB:  [place]    Insurance: [employer]    Agency Contacts: [facility] for medical assistance  Mental Health: denies concerns  Violence: not asked, SO present during conversation  Substance Abuse: denies concerns    Adequate resources for needs (housing, utilities, food/med): yes; and is familiar with how to contact [facility] and [facility] and [facility] if a need arises in the future    Transportation:  Yes, Rachel and Valente both drive, have vehicles, and can afford gas   Car Seat: Yes  Formula: Yes; we reviewed how to access WIC if needed later  Diapers:  Yes  Marcellb or Rox: yes    Education concerns on self/baby care:   Feels confident she and Valente can care for selves and new baby

## 2017-11-27 NOTE — PLAN OF CARE
Patient discharged at 1:27 PM via ambulation accompanied by significant other and staff. Prescriptions sent to patients preferred pharmacy. All belongings sent with patient.     Discharge instructions reviewed with patient and significant other. Listed belongings gathered and returned to patient.     Patient discharged to home.     Core Measures and Vaccines  Core Measures applicable during stay: NA  Pneumonia and Influenza given prior to discharge, if indicated: No pt up to date on vaccinations    Surgical Patient   Surgical Procedures during stay: None  Did patient receive discharge instruction on wound care and recognition of infection symptoms? Yes    MISC  Follow up appointment made:  Yes  Home and hospital aquired medications returned to patient: N/A  Patient reports pain was well managed at discharge: Yes

## 2017-11-27 NOTE — DISCHARGE SUMMARY
VAGINAL DELIVERY DISCHARGE SUMMARY    Admit date: 17  Discharge date: 17    Admit Dx:   - 32 year old y/o  @ 39 weeks by LMP  - GBS - status  - Rh + status    Discharge Dx:  - Same as above, s/p delivery via     Procedures:  - Epidural analgesia given  - IV and PO pain meds none  - IV abx none  - Fetal and uterine monitoring external  - IVF  - Serial laboratory monitoring Hgb    Hemoglobin   Date Value Ref Range Status   2017 8.9 (L) 11.7 - 15.7 g/dL Final   2017 10.9 (L) 11.7 - 15.7 g/dL Final   ]     Admit HPI:  Ms. Rachel Villarreal is a 31 y/o  at 39 weeks gestation who presented to L&D with SROM and was admitted for labor. Her pregnancy has been complicated by nothing. Please see her admit H&P for full details of her PMH, PSH, Meds, Allergies and exam on admit.    Hospital course:  Ms. Rachel Villarreal was admitted to the hospital on 17 for the above listed indications. Her labor progressed normally. She had SROM (but additional forebag was ruptured after admission), progressed to complete dilation, and delivered a female infant in the BHARAT position. APGARS were 9/9. Weight was 7 lb 4 oz. EBL from the delivery was 200 mL. Please see her Delivery Summary for full details regarding her delivery.    Her postpartum course was complicated by nothing. On PPD#1, she was meeting all of her postpartum goals and deemed stable for discharge. She was voiding without difficulty, tolerating a regular diet without nausea and vomiting, her pain was well controlled on oral pain medicines and her lochia was appropriate. Her hemoglobin prior to delivery was 10.9 and after delivery was 8.9. Her Rh status was + and RHOgam was not indicated. At the time of discharge, she was bottle feeding her infant.    Discharge/Disposition:  Ms. Rachel Villarreal was discharged to home in stable condition with the following instructions/medications:  1) Call for temperature > 100.4, foul smelling vaginal  discharge, bleeding > 1 pad per hour x 2 hrs, pain not controlled by oral pain meds, severe constipation or severe nausea or vomiting.  2) Contraception will be discussed at post-partum follow-up.  3) She was instructed to follow-up with her primary OB in 6 weeks for a routine postpartum visit and contraception discussion.  4) She was instructed to continue her PNV on discharge if she wished to breast feed her infant.  5) She was discharged home with the following medications:  a. Ibuprofen  b. Ferrous sulfate  c. Coljuma Rankin MD

## 2017-11-28 LAB — T PALLIDUM IGG+IGM SER QL: NEGATIVE

## 2018-01-05 ENCOUNTER — HOSPITAL ENCOUNTER (OUTPATIENT)
Dept: PHYSICAL THERAPY | Facility: HOSPITAL | Age: 33
Setting detail: THERAPIES SERIES
End: 2018-01-05
Attending: FAMILY MEDICINE
Payer: COMMERCIAL

## 2018-01-05 PROCEDURE — 97162 PT EVAL MOD COMPLEX 30 MIN: CPT | Mod: GP

## 2018-01-05 PROCEDURE — 40000718 ZZHC STATISTIC PT DEPARTMENT ORTHO VISIT

## 2018-01-05 PROCEDURE — 97110 THERAPEUTIC EXERCISES: CPT | Mod: GP

## 2018-01-08 ENCOUNTER — OFFICE VISIT (OUTPATIENT)
Dept: FAMILY MEDICINE | Facility: OTHER | Age: 33
End: 2018-01-08
Attending: FAMILY MEDICINE
Payer: COMMERCIAL

## 2018-01-08 VITALS
TEMPERATURE: 97 F | HEART RATE: 67 BPM | OXYGEN SATURATION: 99 % | BODY MASS INDEX: 28.17 KG/M2 | RESPIRATION RATE: 16 BRPM | WEIGHT: 159 LBS | SYSTOLIC BLOOD PRESSURE: 120 MMHG | DIASTOLIC BLOOD PRESSURE: 70 MMHG

## 2018-01-08 DIAGNOSIS — F33.0 MILD EPISODE OF RECURRENT MAJOR DEPRESSIVE DISORDER (H): ICD-10-CM

## 2018-01-08 DIAGNOSIS — Z30.011 ENCOUNTER FOR INITIAL PRESCRIPTION OF CONTRACEPTIVE PILLS: ICD-10-CM

## 2018-01-08 PROCEDURE — 99000 SPECIMEN HANDLING OFFICE-LAB: CPT | Performed by: FAMILY MEDICINE

## 2018-01-08 PROCEDURE — 87624 HPV HI-RISK TYP POOLED RSLT: CPT | Mod: 90 | Performed by: FAMILY MEDICINE

## 2018-01-08 PROCEDURE — G0123 SCREEN CERV/VAG THIN LAYER: HCPCS | Performed by: FAMILY MEDICINE

## 2018-01-08 PROCEDURE — 99207 ZZC POST PARTUM EXAM: CPT | Performed by: FAMILY MEDICINE

## 2018-01-08 RX ORDER — SERTRALINE HYDROCHLORIDE 100 MG/1
100 TABLET, FILM COATED ORAL DAILY
Qty: 30 TABLET | Refills: 5 | Status: SHIPPED | OUTPATIENT
Start: 2018-01-08 | End: 2018-08-20

## 2018-01-08 ASSESSMENT — PATIENT HEALTH QUESTIONNAIRE - PHQ9: SUM OF ALL RESPONSES TO PHQ QUESTIONS 1-9: 0

## 2018-01-08 ASSESSMENT — ANXIETY QUESTIONNAIRES
2. NOT BEING ABLE TO STOP OR CONTROL WORRYING: NOT AT ALL
3. WORRYING TOO MUCH ABOUT DIFFERENT THINGS: NOT AT ALL
4. TROUBLE RELAXING: NOT AT ALL
5. BEING SO RESTLESS THAT IT IS HARD TO SIT STILL: NOT AT ALL
1. FEELING NERVOUS, ANXIOUS, OR ON EDGE: NOT AT ALL
GAD7 TOTAL SCORE: 0
6. BECOMING EASILY ANNOYED OR IRRITABLE: NOT AT ALL
7. FEELING AFRAID AS IF SOMETHING AWFUL MIGHT HAPPEN: NOT AT ALL

## 2018-01-08 ASSESSMENT — PAIN SCALES - GENERAL: PAINLEVEL: NO PAIN (0)

## 2018-01-08 NOTE — NURSING NOTE
"Chief Complaint   Patient presents with     Post Partum Exam       Initial /70 (BP Location: Left arm, Patient Position: Sitting, Cuff Size: Adult Regular)  Pulse 67  Temp 97  F (36.1  C) (Tympanic)  Resp 16  Wt 159 lb (72.1 kg)  LMP 02/26/2017  SpO2 99%  BMI 28.17 kg/m2 Estimated body mass index is 28.17 kg/(m^2) as calculated from the following:    Height as of 11/10/17: 5' 3\" (1.6 m).    Weight as of this encounter: 159 lb (72.1 kg).  Medication Reconciliation: complete     Gayle Hutson      "

## 2018-01-08 NOTE — PROGRESS NOTES
" 01/05/18 1000   General Information   Type of Visit Initial OP Ortho PT Evaluation   Start of Care Date 01/05/18   Referring Physician Dr Rankin   Orders Evaluate and Treat   Date of Order 12/17/17   Insurance Type (preferred one)   Medical Diagnosis bowel incontinence.   Surgical/Medical history reviewed Yes   Precautions/Limitations no known precautions/limitations;other (see comments)  (- vaginal delivery- baby is 6 weeks old)   Body Part(s)   Body Part(s) Pelvic Floor Dysfunction   Presentation and Etiology   Pertinent history of current problem (include personal factors and/or comorbidities that impact the POC) This 31 y/o female is s/p having baby per vaginal delivery 6 weeks ago. Pt is having significant bowel incontinence - pt is having \"vaginal\" walls falling per her report from her MD. Pt reports having slight changes in urinary continence but main issue is bowel incontinence. This appears to be improving over past 1-2 weeks already.. Pt has not returned to work yet. Pt reports MD diagnosed her with rectocele since giving birth- not recorded in chart. Recd orders on 12/17/17 through mychart from Dr Rankin for \"PT d/t pelvic floor dysfunction after child birth\"   Impairments P. Bowel or bladder problems   Functional Limitations (no significant pain this date)   Current Level of Function   Current Community Support Family/friend caregiver   Patient role/employment history A. Employed   Employment Comments returning to work 1/22/18.   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 No   Have you fallen and had an injury in the past year? No   Is patient a fall risk? No   Specific Questions   Specific Questions Pelvic Floor Dysfunction;Women's Health;Pregnancy   Pelvic Floor Dysfunction Questions   Regular exercise No   Fluid intake-glasses/day (one glass/cup = 8oz 40 " "  Caffeinated beverages-glasses/day 30   Alcoholic beverages - glasses/day 0   Recent diet change? No   How long can you delay the need to urinate?  varies- has more urgency   How many times do you wake to urinate at night?   1-2  (when up feeding the baby)   Can you stop the flow of urine when on the toilet?  Other  (unsure)   Is the volume of urine passed usually  Average   Do you have the sensation that you need to go to the toilet?  Yes   Do you empty your bladder frequently, before you experience the urge to pass urine?  No   Do you have \"triggers\" that make you feel you can't wait to go to the toilet?  No   Number of bladder infections last year?  0  (Pt thought she had UTI because she had burning in stomach)   Frequency of bowel movements:  (diarrhea initially)   Consistency of stool?  Soft formed   Do you ignore the urge to defecate?  No   Women's Health Questions   Number of pregnancies  2   Number of vaginal deliveries  2  (12 hour total- 45 mn pushing - coughing too)   Weight of largest baby  7 lbs 4 ounces   Number of episiotomies  2  (tore )   Pelvic Floor Dysfunction Objective Findings   Posture decrd abdom tone, fwd shlds, head.   Areas of Tightness (Pt appears to have grade 2-3 prolapse vaginally)   Abdominal Wall Diastasis recti   Type of Storage Problem frequency;urgency  (occasonal, pt does void at night when up with baby)   Type of Emptying Problem fecal incontinence   Protection needed None   Power (MMT at Levator Ani) 0   Elevation (Able to lift posterior vaginal wall toward head and public bone) Absent   Medications Anti-depressants  (ibuprofen)   Planned Therapy Interventions   Planned Therapy Interventions manual therapy;neuromuscular re-education;strengthening;stretching   Planned Therapy Interventions Comment HEP, education   Planned Modality Interventions   Planned Modality Interventions Biofeedback   Clinical Impression   Criteria for Skilled Therapeutic Interventions Met yes, treatment " indicated   PT Diagnosis bowel incontinence with urinary urgency/frquency dt poor ability to contract PFM   Influenced by the following impairments poor strength/coordination of PFM, bowel incontinence, frquency/urgency.   Functional limitations due to impairments travelling, working, ADLs, socialization   Clinical Presentation Evolving/Changing   Clinical Decision Making (Complexity) Moderate complexity   Therapy Frequency 1 time/week   Predicted Duration of Therapy Intervention (days/wks) 12 weeks   Risk & Benefits of therapy have been explained Yes   Patient, Family & other staff in agreement with plan of care Yes   Clinical Impression Comments Pt will benefit from guided PT course of PFM neuromusc re education and strengthening to improve control of bowel and bladder. Pt will also have HEP comprised of strengthening exs and walking program to work towards goals on her own.  Home unit rental may be ideal for pt as being a new mom and starting back to her job may make attending PT difficult   Education Assessment   Barriers to Learning No barriers   ORTHO GOALS   PT Ortho Eval Goals 1;2;3   Ortho Goal 1   Goal Identifier STG 1   Goal Description Pt will be indep in performance of HEP of PFM exercises daily   Target Date 01/29/18   Ortho Goal 2   Goal Identifier STG 2   Goal Description Pt will have incr PFM strength and control as seen by the incr in number of days without an accident    Target Date 02/19/18   Ortho Goal 3   Goal Identifier LTG   Goal Description Pt will not have adls or work affected by incontinence or urgency/frequency   Target Date 04/02/18

## 2018-01-08 NOTE — MR AVS SNAPSHOT
After Visit Summary   1/8/2018    Rachel Villarreal    MRN: [id number]           Patient Information     Date Of Birth          1985        Visit Information        Provider Department      1/8/2018 3:45 PM Mary Rankin MD [facility]        Today's Diagnoses     Routine postpartum follow-up    -  1    Mild episode of recurrent major depressive disorder (H)        Encounter for initial prescription of contraceptive pills           Follow-ups after your visit        Follow-up notes from your care team     Return if symptoms worsen or fail to improve.      Your next 10 appointments already scheduled     Arnol 10, 2018  8:00 AM CST   Women's Health Treatment with Nicole Huber, PT   [facility] ([facility] )    [address] [address] [phone]            Jan 17, 2018  9:00 AM CST   Women's Health Treatment with Nicole Huber, PT   [facility] ([facility] )    [address] [address] [phone]              Who to contact     If you have questions or need follow up information about today's clinic visit or your schedule please contact [facility] directly at [phone].  Normal or non-critical lab and imaging results will be communicated to you by MyChart, letter or phone within 4 business days after the clinic has received the results. If you do not hear from us within 7 days, please contact the clinic through Curiously[employer] or phone. If you have a critical or abnormal lab result, we will notify you by phone as soon as possible.  Submit refill requests through [employer]Floor or call your pharmacy and they will forward the refill request to us. Please allow 3 business days for your refill to be completed.          Additional Information About Your Visit        MyChart Information     TopFloor gives you secure access to your electronic health record. If you see a primary care provider, you can  also send messages to your care team and make appointments. If you have questions, please call your primary care clinic.  If you do not have a primary care provider, please call [phone] and they will assist you.        Care EveryWhere ID     This is your Care EveryWhere ID. This could be used by other organizations to access your [place] medical records  [id number]        Your Vitals Were     Pulse Temperature Respirations Last Period Pulse Oximetry BMI (Body Mass Index)    67 97  F (36.1  C) (Tympanic) 16 02/26/2017 99% 28.17 kg/m2       Blood Pressure from Last 3 Encounters:   01/08/18 120/70   11/27/17 124/88   11/21/17 108/68    Weight from Last 3 Encounters:   01/08/18 159 lb (72.1 kg)   11/21/17 181 lb (82.1 kg)   11/16/17 181 lb (82.1 kg)              We Performed the Following     A pap thin layer screen with  HPV - recommended age 30 - 65 years (select HPV order below)     HPV High Risk Types DNA Cervical          Today's Medication Changes          These changes are accurate as of: 1/8/18 11:59 PM.  If you have any questions, ask your nurse or doctor.               Start taking these medicines.        Dose/Directions    norethin-eth estrad triphasic 0.5/1/0.5-35 MG-MCG per tablet   Commonly known as:  JOSELYN   Used for:  Encounter for initial prescription of contraceptive pills   Started by:  Mary Rankin MD        Dose:  1 tablet   Take 1 tablet by mouth daily   Quantity:  84 tablet   Refills:  3         These medicines have changed or have updated prescriptions.        Dose/Directions    sertraline 100 MG tablet   Commonly known as:  ZOLOFT   This may have changed:    - medication strength  - how much to take   Used for:  Mild episode of recurrent major depressive disorder (H)   Changed by:  Mary Rankin MD        Dose:  100 mg   Take 1 tablet (100 mg) by mouth daily   Quantity:  30 tablet   Refills:  5         Stop taking these medicines if you haven't already. Please contact your  care team if you have questions.     acetaminophen 325 MG tablet   Commonly known as:  TYLENOL   Stopped by:  Mary Rankin MD           docusate sodium 100 MG capsule   Commonly known as:  COLACE   Stopped by:  Mary Rankin MD           ferrous sulfate 325 (65 FE) MG tablet   Commonly known as:  IRON   Stopped by:  Mary Rankin MD           lanolin ointment   Stopped by:  Mary Rankin MD           PRENATAL VITAMINS PO   Stopped by:  Mary Rankin MD                Where to get your medicines      These medications were sent to [email or web address] [address]  AT [facility] [address] [facility] [address]     Phone:  [phone]     norethin-eth estrad triphasic 0.5/1/0.5-35 MG-MCG per tablet    sertraline 100 MG tablet                Primary Care Provider Office Phone # Fax #    Mary Rankin [phone] [address] [facility] [address]        Equal Access to Services     [facility]: Hadii aad ku hadasho Soomaali, waaxda luqadaha, qaybta kaalmada adeegyacirilo, corrine christina . So [facility] [phone].    ATENCIÓN: Si habla español, tiene a davis disposición servicios gratuitos de asistencia lingüística. [facility] [phone].    We comply with applicable federal civil rights laws and [place] laws. We do not discriminate on the basis of race, color, national origin, age, disability, sex, sexual orientation, or gender identity.            Thank you!     Thank you for choosing [facility]  for your care. Our goal is always to provide you with excellent care. Hearing back from our patients is one way we can continue to improve our services. Please take a few minutes to complete the written survey that you may receive in the mail after your visit with us. Thank you!             Your Updated Medication List - Protect others around you: Learn how to  safely use, store and throw away your medicines at www.disposemymeds.org.          This list is accurate as of: 18 11:59 PM.  Always use your most recent med list.                   Brand Name Dispense Instructions for use Diagnosis    ibuprofen 800 MG tablet    ADVIL/MOTRIN    90 tablet    Take 1 tablet (800 mg) by mouth every 8 hours as needed for other (cramping)     (normal spontaneous vaginal delivery)       norethin-eth estrad triphasic 0.5/1/0.5-35 MG-MCG per tablet    JOSELYN    84 tablet    Take 1 tablet by mouth daily    Encounter for initial prescription of contraceptive pills       sertraline 100 MG tablet    ZOLOFT    30 tablet    Take 1 tablet (100 mg) by mouth daily    Mild episode of recurrent major depressive disorder (H)

## 2018-01-09 ASSESSMENT — ANXIETY QUESTIONNAIRES: GAD7 TOTAL SCORE: 0

## 2018-01-09 NOTE — PROGRESS NOTES
CC:  Post-partum exam    HPI:  Rachel is a 32 year old female who present today for post-partum exam.  Patient had vaginal delivery on 11/26/17.  Complications - none.  Her last pap smear was just over 3 years ago.  She denies any history of abnormal pap smears.  Mood is good overall.  Patient is bottle feeding her baby.  She denies breast tenderness or soreness.  Patient desires OCP for birth control    She has been working with therapy.  She has been told by the therapist she has a rectocele.      Past Medical History:   Diagnosis Date     Herpes simplex infection of genitourinary system 5/11/2017     Mild episode of recurrent major depressive disorder (H) 9/6/2016       Past Surgical History:   Procedure Laterality Date     ENT SURGERY  2001    TONSILECTOMY     GYN SURGERY  2011    invetro fertilization       Current Outpatient Prescriptions   Medication     sertraline (ZOLOFT) 100 MG tablet     norethin-eth estrad triphasic (JOSELYN) 0.5/1/0.5-35 MG-MCG per tablet     ibuprofen (ADVIL/MOTRIN) 800 MG tablet     No current facility-administered medications for this visit.        Allergies   Allergen Reactions     Penicillins Rash       Family History   Problem Relation Age of Onset     Hypertension Mother      Other Cancer Maternal Grandfather      DIABETES Paternal Grandfather        Social History     Social History     Marital status: Single     Spouse name: N/A     Number of children: N/A     Years of education: N/A     Social History Main Topics     Smoking status: Never Smoker     Smokeless tobacco: Never Used     Alcohol use 0.0 oz/week     0 Standard drinks or equivalent per week      Comment: rare     Drug use: No     Sexual activity: Yes     Partners: Male     Birth control/ protection: None     Other Topics Concern     None     Social History Narrative         Review Of Systems  Skin: negative  Eyes: negative  Ears/Nose/Throat: negative  Respiratory: No shortness of breath, dyspnea on exertion, cough, or  hemoptysis  Cardiovascular: negative for, palpitations, irregular heart beat and chest pain  Gastrointestinal: negative for, nausea, vomiting and abdominal pain  Genitourinary: negative for, dysuria, frequency, urgency and hesitancy  Musculoskeletal: negative for, joint pain, joint swelling and joint stiffness  Neurologic: negative for, numbness or tingling of hands, numbness or tingling of feet and speech problems  Psychiatric: negative for, anxiety, depression and agitation  Hematologic/Lymphatic/Immunologic: negative for  Endocrine: negative    Exam:  /70 (BP Location: Left arm, Patient Position: Sitting, Cuff Size: Adult Regular)  Pulse 67  Temp 97  F (36.1  C) (Tympanic)  Resp 16  Wt 159 lb (72.1 kg)  LMP 02/26/2017  SpO2 99%  BMI 28.17 kg/m2  Constitutional: healthy, alert, no distress and cooperative  Cardiovascular: regular, normal S1 and S2, no murmurs appreciated  Respiratory: Lungs clear without wheezes or crackles  Breast: deferred  Gastrointestinal: Abdomen soft, non-tender. BS normal. No masses, organomegaly, fundus firm and below symphysis  /Pelvic Exam:  Normal external genitalia, speculum exam was performed without difficulty and reveals normal appearing cervix, pap sample is taken.  Laxity in posterior vaginal wall noted.  Neurologic: Gait normal. Sensation grossly WNL.  Psychiatric: mentation appears normal and affect normal/bright    PHQ-9 SCORE 11/16/2017 11/21/2017 1/8/2018   Total Score 0 0 0      JOHANA-7 SCORE 11/16/2017 11/21/2017 1/8/2018   Total Score 0 0 0          Assessment/Plan  (Z39.2) Routine postpartum follow-up  (primary encounter diagnosis)  Comment:   Plan: A pap thin layer screen with  HPV - recommended        age 30 - 65 years (select HPV order below), HPV        High Risk Types DNA Cervical        Pap completed, repeat in five years if negative.    (F33.0) Mild episode of recurrent major depressive disorder (H)  Comment:   Plan: sertraline (ZOLOFT) 100 MG tablet         Reordered.    (Z30.011) Encounter for initial prescription of contraceptive pills  Comment:   Plan: norethin-eth estrad triphasic (JOSELYN)         0.5/1/0.5-35 MG-MCG per tablet        Restarted.    Patient will continue to work with therapy.  Consider referral to OB/Gyn if no improvement noted.        Mary Sim MD

## 2018-01-10 ENCOUNTER — HOSPITAL ENCOUNTER (OUTPATIENT)
Dept: PHYSICAL THERAPY | Facility: HOSPITAL | Age: 33
Setting detail: THERAPIES SERIES
End: 2018-01-10
Attending: FAMILY MEDICINE
Payer: COMMERCIAL

## 2018-01-10 PROCEDURE — 97110 THERAPEUTIC EXERCISES: CPT | Mod: GP

## 2018-01-10 PROCEDURE — 97140 MANUAL THERAPY 1/> REGIONS: CPT | Mod: GP

## 2018-01-10 PROCEDURE — 40000718 ZZHC STATISTIC PT DEPARTMENT ORTHO VISIT

## 2018-01-12 LAB
COPATH REPORT: NORMAL
PAP: NORMAL

## 2018-01-16 LAB
FINAL DIAGNOSIS: NORMAL
HPV HR 12 DNA CVX QL NAA+PROBE: NEGATIVE
HPV16 DNA SPEC QL NAA+PROBE: NEGATIVE
HPV18 DNA SPEC QL NAA+PROBE: NEGATIVE
SPECIMEN DESCRIPTION: NORMAL

## 2018-01-24 ENCOUNTER — HOSPITAL ENCOUNTER (OUTPATIENT)
Dept: PHYSICAL THERAPY | Facility: HOSPITAL | Age: 33
Setting detail: THERAPIES SERIES
End: 2018-01-24
Attending: FAMILY MEDICINE
Payer: COMMERCIAL

## 2018-01-24 PROCEDURE — 40000718 ZZHC STATISTIC PT DEPARTMENT ORTHO VISIT

## 2018-01-24 PROCEDURE — 97112 NEUROMUSCULAR REEDUCATION: CPT | Mod: GP

## 2018-01-24 PROCEDURE — 97110 THERAPEUTIC EXERCISES: CPT | Mod: GP

## 2018-01-30 ENCOUNTER — HOSPITAL ENCOUNTER (OUTPATIENT)
Dept: PHYSICAL THERAPY | Facility: HOSPITAL | Age: 33
Setting detail: THERAPIES SERIES
End: 2018-01-30
Attending: FAMILY MEDICINE
Payer: COMMERCIAL

## 2018-01-30 PROCEDURE — 40000718 ZZHC STATISTIC PT DEPARTMENT ORTHO VISIT

## 2018-01-30 PROCEDURE — 97140 MANUAL THERAPY 1/> REGIONS: CPT | Mod: GP

## 2018-01-30 PROCEDURE — 97112 NEUROMUSCULAR REEDUCATION: CPT | Mod: GP

## 2018-01-30 PROCEDURE — 97110 THERAPEUTIC EXERCISES: CPT | Mod: GP

## 2018-03-07 ENCOUNTER — MYC MEDICAL ADVICE (OUTPATIENT)
Dept: FAMILY MEDICINE | Facility: OTHER | Age: 33
End: 2018-03-07

## 2018-03-07 NOTE — TELEPHONE ENCOUNTER
Offered appointment with PCP for Thursday and Friday. Cannot take d/t work will go to UC after work today.

## 2018-03-07 NOTE — TELEPHONE ENCOUNTER
Patient of .See [employer] Message.Did leave a message to schedule with PCP tomorrow or Friday.Any other recommendations? Thank you

## 2018-06-08 ENCOUNTER — MYC MEDICAL ADVICE (OUTPATIENT)
Dept: FAMILY MEDICINE | Facility: OTHER | Age: 33
End: 2018-06-08

## 2018-06-08 NOTE — TELEPHONE ENCOUNTER
Called patient and abdomen is tender to touch lower. Nausea this AM. Has not urinated since 0830 and has been drinking fluids.No fever,no burning with urination.Normal BM's.Pain while walking and light headedness.C/O of just not feeling right.Advised UC care.Please note.

## 2018-06-10 ENCOUNTER — TRANSFERRED RECORDS (OUTPATIENT)
Dept: HEALTH INFORMATION MANAGEMENT | Facility: CLINIC | Age: 33
End: 2018-06-10

## 2018-06-25 ENCOUNTER — OFFICE VISIT (OUTPATIENT)
Dept: FAMILY MEDICINE | Facility: OTHER | Age: 33
End: 2018-06-25
Attending: FAMILY MEDICINE
Payer: COMMERCIAL

## 2018-06-25 VITALS
TEMPERATURE: 97.8 F | BODY MASS INDEX: 27.11 KG/M2 | HEART RATE: 90 BPM | DIASTOLIC BLOOD PRESSURE: 60 MMHG | RESPIRATION RATE: 14 BRPM | SYSTOLIC BLOOD PRESSURE: 98 MMHG | HEIGHT: 63 IN | WEIGHT: 153 LBS | OXYGEN SATURATION: 99 %

## 2018-06-25 DIAGNOSIS — Z90.49 S/P APPENDECTOMY: Primary | ICD-10-CM

## 2018-06-25 PROCEDURE — 99212 OFFICE O/P EST SF 10 MIN: CPT | Performed by: FAMILY MEDICINE

## 2018-06-25 ASSESSMENT — ANXIETY QUESTIONNAIRES
7. FEELING AFRAID AS IF SOMETHING AWFUL MIGHT HAPPEN: NOT AT ALL
5. BEING SO RESTLESS THAT IT IS HARD TO SIT STILL: NOT AT ALL
6. BECOMING EASILY ANNOYED OR IRRITABLE: NOT AT ALL
2. NOT BEING ABLE TO STOP OR CONTROL WORRYING: NOT AT ALL
1. FEELING NERVOUS, ANXIOUS, OR ON EDGE: NOT AT ALL
4. TROUBLE RELAXING: NOT AT ALL
3. WORRYING TOO MUCH ABOUT DIFFERENT THINGS: NOT AT ALL
GAD7 TOTAL SCORE: 0

## 2018-06-25 ASSESSMENT — PAIN SCALES - GENERAL: PAINLEVEL: NO PAIN (0)

## 2018-06-25 NOTE — PROGRESS NOTES
SUBJECTIVE:   Rachel Villarreal is a 32 year old female who presents to clinic today for the following health issues:          Hospital Follow-up Visit:    Hospital/Nursing Home/ Rehab Facility: [facility]  Date of Admission:   Date of Discharge:   Reason(s) for Admission: EMERGENCY APPENDECTOMY , DOING WELL            Problems taking medications regularly:  None       Medication changes since discharge: None       Problems adhering to non-medication therapy:  None    Summary of hospitalization:  CareEverywhere information obtained and reviewed  Diagnostic Tests/Treatments reviewed.  Follow up needed: none  Other Healthcare Providers Involved in Patient s Care:         None  Update since discharge: improved.     Post Discharge Medication Reconciliation: discharge medications reconciled, continue medications without change.  Plan of care communicated with patient     Coding guidelines for this visit:  Type of Medical   Decision Making Face-to-Face Visit       within 7 Days of discharge Face-to-Face Visit        within 14 days of discharge   Moderate Complexity 16896 [id number]   High Complexity [id number]                Problem list and histories reviewed & adjusted, as indicated.  Additional history: as documented    Patient Active Problem List   Diagnosis     ACP (advance care planning)     Mild episode of recurrent major depressive disorder (H)     Advance care planning     Supervision of other normal pregnancy, antepartum     Herpes simplex infection of genitourinary system     Encounter for triage in pregnant patient     Normal labor and delivery      (normal spontaneous vaginal delivery)     Past Surgical History:   Procedure Laterality Date     ENT SURGERY      TONSILECTOMY     GYN SURGERY      invetro fertilization     LAPAROSCOPIC APPENDECTOMY  06/10/2018    [facility]       Social History   Substance Use Topics     Smoking status: Never Smoker     Smokeless tobacco: Never  "Used     Alcohol use 0.0 oz/week     0 Standard drinks or equivalent per week      Comment: rare     Family History   Problem Relation Age of Onset     Hypertension Mother      Other Cancer Maternal Grandfather      Diabetes Paternal Grandfather          Current Outpatient Prescriptions   Medication Sig Dispense Refill     norethin-eth estrad triphasic (JOSELYN) 0.5/1/0.5-35 MG-MCG per tablet Take 1 tablet by mouth daily 84 tablet 3     sertraline (ZOLOFT) 100 MG tablet Take 1 tablet (100 mg) by mouth daily 30 tablet 5     ibuprofen (ADVIL/MOTRIN) 800 MG tablet Take 1 tablet (800 mg) by mouth every 8 hours as needed for other (cramping) (Patient not taking: Reported on 1/8/2018) 90 tablet 1     Allergies   Allergen Reactions     Penicillins Rash       Reviewed and updated as needed this visit by clinical staff  Tobacco  Allergies  Meds  Problems  Med Hx  Surg Hx  Fam Hx  Soc Hx        Reviewed and updated as needed this visit by Provider         ROS:  Constitutional, HEENT, cardiovascular, pulmonary, gi and gu systems are negative, except as otherwise noted.    OBJECTIVE:     BP 98/60 (BP Location: Left arm, Patient Position: Sitting, Cuff Size: Adult Large)  Pulse 90  Temp 97.8  F (36.6  C) (Tympanic)  Resp 14  Ht 5' 3\" (1.6 m)  Wt 153 lb (69.4 kg)  SpO2 99%  BMI 27.1 kg/m2  Body mass index is 27.1 kg/(m^2).  GENERAL: healthy, alert and no distress  SKIN: surgical incisions healing well without signs of infection  PSYCH: mentation appears normal, affect normal/bright    Diagnostic Test Results:  none     ASSESSMENT/PLAN:     1. S/P appendectomy  Healing well, no changes recommended at this time.  Follow post-op instructions per surgeon.  Follow-up here as needed.        Mary Rankin MD  [facility]    "

## 2018-06-25 NOTE — MR AVS SNAPSHOT
"              After Visit Summary   6/25/2018    Rachel Villarreal    MRN: [id number]           Patient Information     Date Of Birth          1985        Visit Information        Provider Department      6/25/2018 9:30 AM Mary Rankin MD [facility]        Today's Diagnoses     S/P appendectomy    -  1       Follow-ups after your visit        Follow-up notes from your care team     Return if symptoms worsen or fail to improve.      Who to contact     If you have questions or need follow up information about today's clinic visit or your schedule please contact [facility] directly at [phone].  Normal or non-critical lab and imaging results will be communicated to you by [employer] Inchart, letter or phone within 4 business days after the clinic has received the results. If you do not hear from us within 7 days, please contact the clinic through [employer] Inchart or phone. If you have a critical or abnormal lab result, we will notify you by phone as soon as possible.  Submit refill requests through [employer]edia or call your pharmacy and they will forward the refill request to us. Please allow 3 business days for your refill to be completed.          Additional Information About Your Visit        MyChart Information     [employer] gives you secure access to your electronic health record. If you see a primary care provider, you can also send messages to your care team and make appointments. If you have questions, please call your primary care clinic.  If you do not have a primary care provider, please call [phone] and they will assist you.        Care EveryWhere ID     This is your Care EveryWhere ID. This could be used by other organizations to access your [place] medical records  [id number]        Your Vitals Were     Pulse Temperature Respirations Height Pulse Oximetry BMI (Body Mass Index)    90 97.8  F (36.6  C) (Tympanic) 14 5' 3\" (1.6 m) 99% 27.1 kg/m2       Blood Pressure from Last 3 " Encounters:   18 98/60   18 120/70   17 124/88    Weight from Last 3 Encounters:   18 153 lb (69.4 kg)   18 159 lb (72.1 kg)   17 181 lb (82.1 kg)              Today, you had the following     No orders found for display       Primary Care Provider Office Phone # Fax #    Mary KATELIN Rankin [phone] [address] [facility] [address]        Equal Access to Services     [facility]: Hadii alanis nguyeno Soomaali, waaxda luqadaha, qaybta kaalmada tomas, corrine christina . So [facility] [phone].    ATENCIÓN: Si habla español, tiene a davis disposición servicios gratuitos de asistencia lingüística. [facility] [phone].    We comply with applicable federal civil rights laws and [place] laws. We do not discriminate on the basis of race, color, national origin, age, disability, sex, sexual orientation, or gender identity.            Thank you!     Thank you for choosing [facility]  for your care. Our goal is always to provide you with excellent care. Hearing back from our patients is one way we can continue to improve our services. Please take a few minutes to complete the written survey that you may receive in the mail after your visit with us. Thank you!             Your Updated Medication List - Protect others around you: Learn how to safely use, store and throw away your medicines at www.disposemymeds.org.          This list is accurate as of 18  9:48 AM.  Always use your most recent med list.                   Brand Name Dispense Instructions for use Diagnosis    ibuprofen 800 MG tablet    ADVIL/MOTRIN    90 tablet    Take 1 tablet (800 mg) by mouth every 8 hours as needed for other (cramping)     (normal spontaneous vaginal delivery)       norethin-eth estrad triphasic 0.5/1/0.5-35 MG-MCG per tablet    JOSELYN    84 tablet    Take 1 tablet by mouth daily    Encounter for initial prescription  of contraceptive pills       sertraline 100 MG tablet    ZOLOFT    30 tablet    Take 1 tablet (100 mg) by mouth daily    Mild episode of recurrent major depressive disorder (H)

## 2018-06-25 NOTE — NURSING NOTE
"Chief Complaint   Patient presents with     Hospital F/U       Initial BP 98/60 (BP Location: Left arm, Patient Position: Sitting, Cuff Size: Adult Large)  Pulse 90  Temp 97.8  F (36.6  C) (Tympanic)  Resp 14  Ht 5' 3\" (1.6 m)  Wt 153 lb (69.4 kg)  SpO2 99%  BMI 27.1 kg/m2 Estimated body mass index is 27.1 kg/(m^2) as calculated from the following:    Height as of this encounter: 5' 3\" (1.6 m).    Weight as of this encounter: 153 lb (69.4 kg).  Medication Reconciliation: complete    Gayle Hutson LPN    "

## 2018-06-26 ASSESSMENT — ANXIETY QUESTIONNAIRES: GAD7 TOTAL SCORE: 0

## 2018-06-26 ASSESSMENT — PATIENT HEALTH QUESTIONNAIRE - PHQ9: SUM OF ALL RESPONSES TO PHQ QUESTIONS 1-9: 0

## 2018-11-24 DIAGNOSIS — A60.00 HERPES SIMPLEX INFECTION OF GENITOURINARY SYSTEM: ICD-10-CM

## 2018-11-26 DIAGNOSIS — A60.00 HERPES SIMPLEX INFECTION OF GENITOURINARY SYSTEM: Primary | ICD-10-CM

## 2018-11-26 RX ORDER — ACYCLOVIR 400 MG/1
TABLET ORAL
Qty: 60 TABLET | Refills: 1 | Status: SHIPPED | OUTPATIENT
Start: 2018-11-26 | End: 2019-03-21

## 2018-11-26 NOTE — TELEPHONE ENCOUNTER
Acyclovir request from pharmacy.Not on current Epic medication list. Last office visit on 6.25.18. Please advise.Thank you.

## 2018-11-26 NOTE — TELEPHONE ENCOUNTER
Acyclovir      Last Written Prescription Date:  10/27/17  Medication was suspended  Last Fill Quantity: 60,   # refills: 2  Last Office Visit: 6/25/18  Future Office visit:    Next 5 appointments (look out 90 days)     Dec 07, 2018  3:30 PM CST   (Arrive by 3:15 PM)   SHORT with Mary Rankin MD   [facility] ([facility] )    [address]  [place] [address]   [phone]                   Routing refill request to provider for review/approval because:

## 2018-11-27 RX ORDER — ACYCLOVIR 400 MG/1
400 TABLET ORAL 2 TIMES DAILY
Qty: 60 TABLET | Refills: 2 | OUTPATIENT
Start: 2018-11-27

## 2018-12-07 ENCOUNTER — MYC MEDICAL ADVICE (OUTPATIENT)
Dept: FAMILY MEDICINE | Facility: OTHER | Age: 33
End: 2018-12-07

## 2018-12-18 ENCOUNTER — E-VISIT (OUTPATIENT)
Dept: FAMILY MEDICINE | Facility: OTHER | Age: 33
End: 2018-12-18
Payer: COMMERCIAL

## 2018-12-18 DIAGNOSIS — J01.90 ACUTE SINUSITIS WITH SYMPTOMS > 10 DAYS: Primary | ICD-10-CM

## 2018-12-18 PROCEDURE — 99444 ZZC PHYSICIAN ONLINE EVALUATION & MANAGEMENT SERVICE: CPT | Performed by: FAMILY MEDICINE

## 2018-12-18 RX ORDER — DOXYCYCLINE HYCLATE 100 MG
100 TABLET ORAL 2 TIMES DAILY
Qty: 28 TABLET | Refills: 0 | Status: SHIPPED | OUTPATIENT
Start: 2018-12-18 | End: 2019-03-21

## 2018-12-27 NOTE — TELEPHONE ENCOUNTER
ELECTRONIC VISIT DOCUMENTATION:    SUBJECTIVE:  Note above accurately captures the substance of my conversation with the patient.    ASSESSMENT/ PLAN:  1. Acute sinusitis with symptoms > 10 days  Antibiotics sent.  Follow-up if no improvement noted.  - doxycycline hyclate (VIBRA-TABS) 100 MG tablet; Take 1 tablet (100 mg) by mouth 2 times daily for 14 days For infection  Dispense: 28 tablet; Refill: 0      Mary Rankin MD

## 2019-01-13 DIAGNOSIS — F33.0 MILD EPISODE OF RECURRENT MAJOR DEPRESSIVE DISORDER (H): ICD-10-CM

## 2019-01-14 RX ORDER — SERTRALINE HYDROCHLORIDE 100 MG/1
TABLET, FILM COATED ORAL
Qty: 30 TABLET | Refills: 5 | Status: SHIPPED | OUTPATIENT
Start: 2019-01-14 | End: 2019-10-10

## 2019-03-18 NOTE — PROGRESS NOTES
SUBJECTIVE:                                                    Rachel Villarreal is a 33 year old female who presents to clinic today for the following health issues:        Derm Problem-Mole check      Duration: many years    Description (location/character/radiation): right side of neck    Intensity:  mild    Accompanying signs and symptoms: catching on things occasionally, irritates the area    History (similar episodes/previous evaluation): None    Precipitating or alleviating factors: None    Therapies tried and outcome: None         Problem list and histories reviewed & adjusted, as indicated.  Additional history: as documented    Patient Active Problem List   Diagnosis     ACP (advance care planning)     Mild episode of recurrent major depressive disorder (H)     Advance care planning     Supervision of other normal pregnancy, antepartum     Herpes simplex infection of genitourinary system     Encounter for triage in pregnant patient     Normal labor and delivery      (normal spontaneous vaginal delivery)     Past Surgical History:   Procedure Laterality Date     ENT SURGERY      TONSILECTOMY     GYN SURGERY      invetro fertilization     LAPAROSCOPIC APPENDECTOMY  06/10/2018    [facility]       Social History     Tobacco Use     Smoking status: Never Smoker     Smokeless tobacco: Never Used   Substance Use Topics     Alcohol use: Yes     Alcohol/week: 0.0 oz     Comment: rare     Family History   Problem Relation Age of Onset     Hypertension Mother      Other Cancer Maternal Grandfather      Diabetes Paternal Grandfather          Current Outpatient Medications   Medication Sig Dispense Refill     sertraline (ZOLOFT) 100 MG tablet TAKE 1 TABLET(100 MG) BY MOUTH DAILY (Patient taking differently: TAKE 1/2 TABLET(50 MG) BY MOUTH DAILY) 30 tablet 5     TRINESSA LO 0.18/0.215/0.25 MG-25 MCG per tablet TAKE 1 TABLET BY MOUTH DAILY 28 tablet 9     ibuprofen (ADVIL/MOTRIN) 800 MG tablet Take 1 tablet  (800 mg) by mouth every 8 hours as needed for other (cramping) (Patient not taking: Reported on 1/8/2018) 90 tablet 1     Allergies   Allergen Reactions     Penicillins Rash       ROS:  Constitutional, HEENT, cardiovascular, pulmonary, gi and gu systems are negative, except as otherwise noted.    OBJECTIVE:     /78 (BP Location: Right arm, Patient Position: Sitting, Cuff Size: Adult Regular)   Pulse 79   Temp 98.3  F (36.8  C) (Tympanic)   Wt 69.9 kg (154 lb 3.2 oz)   LMP 03/12/2019 (Within Days)   SpO2 98%   Breastfeeding? No   BMI 27.32 kg/m    Body mass index is 27.32 kg/m .  GENERAL: healthy, alert and no distress  SKIN: irritated mole along right side of neck  PSYCH: mentation appears normal, affect normal/bright      ASSESSMENT/PLAN:       1. Change in mole  Surgery referral ordered for removal of mole.  Follow-up as directed.  - GENERAL SURG ADULT REFERRAL        Mary Rankin MD  [facility]

## 2019-03-21 ENCOUNTER — OFFICE VISIT (OUTPATIENT)
Dept: FAMILY MEDICINE | Facility: OTHER | Age: 34
End: 2019-03-21
Attending: FAMILY MEDICINE
Payer: COMMERCIAL

## 2019-03-21 VITALS
TEMPERATURE: 98.3 F | WEIGHT: 154.2 LBS | DIASTOLIC BLOOD PRESSURE: 78 MMHG | BODY MASS INDEX: 27.32 KG/M2 | OXYGEN SATURATION: 98 % | HEART RATE: 79 BPM | SYSTOLIC BLOOD PRESSURE: 118 MMHG

## 2019-03-21 DIAGNOSIS — D22.9 CHANGE IN MOLE: Primary | ICD-10-CM

## 2019-03-21 PROCEDURE — 99213 OFFICE O/P EST LOW 20 MIN: CPT | Performed by: FAMILY MEDICINE

## 2019-03-21 ASSESSMENT — PAIN SCALES - GENERAL: PAINLEVEL: NO PAIN (0)

## 2019-03-21 NOTE — NURSING NOTE
"Chief Complaint   Patient presents with     Mole       Initial /78 (BP Location: Right arm, Patient Position: Sitting, Cuff Size: Adult Regular)   Pulse 79   Temp 98.3  F (36.8  C) (Tympanic)   Wt 69.9 kg (154 lb 3.2 oz)   LMP 03/12/2019 (Within Days)   SpO2 98%   Breastfeeding? No   BMI 27.32 kg/m   Estimated body mass index is 27.32 kg/m  as calculated from the following:    Height as of 6/25/18: 1.6 m (5' 3\").    Weight as of this encounter: 69.9 kg (154 lb 3.2 oz).  Medication Reconciliation: complete    Sonya Armstrong LPN  "

## 2019-04-01 NOTE — PATIENT INSTRUCTIONS
Thank you for allowing Dr. Avina  and our surgical team to participate in your care.  If you have a scheduling or an appointment question please contact Aileen our Health Unit Coordinator at her direct line [phone].   ALL nursing questions or concerns can be directed to your surgical nurse at: [phone]    POST PROCEDURE INSTRUCTIONS      Remove your dressing in 24 hours (If you have one)    Wash incision with a mixture of half water and half hydrogen peroxide 3 times daily.    Apply Aquaphor Healing Ointment to the wound 3 times daily. (Unless specified Bacitracin)    Cover with a clean dressing if in a dirty jillian environment or when wet/soiled    Keep incision clean and dry   Do NOT soak in water such as a tub bath or swimming   Do NOT put make-up, powders, hairspray, lotions, etc on the incision       You can apply ice to the surgical area to help reduce swelling. (no longer than 20 minutes at a time)      You can use acetaminophen(Tylenol) or the prescription you received for pain.       If you have any bleeding, cover the wound with clean gauze and hold pressure for 10 Minutes. If the bleeding does not stop or is heavy and profuse, call the clinic or go to the Urgent Care/Emergency Department.    SIGNS OF INFECTION ARE:    Redness, swelling, red streaks, pus, drainage, warmth, fever, increased pain, foul smell.     Contact your primary health care provider if you notice any of the warning signs.     FOLLOW - UP    Follow-up in clinic for a nurse only visit in 7 days for suture removal.     Pathology results will be called to you when they are back. Usually 7-10 days.

## 2019-04-02 ENCOUNTER — OFFICE VISIT (OUTPATIENT)
Dept: SURGERY | Facility: OTHER | Age: 34
End: 2019-04-02
Attending: SURGERY
Payer: COMMERCIAL

## 2019-04-02 VITALS
RESPIRATION RATE: 14 BRPM | OXYGEN SATURATION: 98 % | WEIGHT: 153 LBS | HEART RATE: 86 BPM | HEIGHT: 63 IN | BODY MASS INDEX: 27.11 KG/M2 | TEMPERATURE: 96.9 F | DIASTOLIC BLOOD PRESSURE: 76 MMHG | SYSTOLIC BLOOD PRESSURE: 122 MMHG

## 2019-04-02 DIAGNOSIS — L98.9 SKIN LESION: Primary | ICD-10-CM

## 2019-04-02 PROCEDURE — 88305 TISSUE EXAM BY PATHOLOGIST: CPT | Mod: TC | Performed by: SURGERY

## 2019-04-02 PROCEDURE — 11104 PUNCH BX SKIN SINGLE LESION: CPT | Performed by: SURGERY

## 2019-04-02 PROCEDURE — 99213 OFFICE O/P EST LOW 20 MIN: CPT | Mod: 25 | Performed by: SURGERY

## 2019-04-02 ASSESSMENT — PAIN SCALES - GENERAL: PAINLEVEL: NO PAIN (0)

## 2019-04-02 ASSESSMENT — MIFFLIN-ST. JEOR: SCORE: 1363.13

## 2019-04-02 NOTE — PROGRESS NOTES
"CLINIC NOTE - CONSULT  4/2/2019    Patient:Rachel Villarreal  Referring Physician: Lanette Salinas    Reason for Referral: Mass on right neck    This is a 33 year old female with a mass on the right neck.  The mass is not getting lartger.  The mass has not been infected in the past.  The mass has not drained.  The patient does desire excision.     Past Medical History:  Past Medical History:   Diagnosis Date     Depressive disorder      Herpes simplex infection of genitourinary system 5/11/2017     Mild episode of recurrent major depressive disorder (H) 9/6/2016       Past Surgical History:  Past Surgical History:   Procedure Laterality Date     ENT SURGERY  2001    TONSILECTOMY     GYN SURGERY  2011    invetro fertilization     LAPAROSCOPIC APPENDECTOMY  06/10/2018    [facility]       Family History History:  Family History   Problem Relation Age of Onset     Hypertension Mother      Other Cancer Maternal Grandfather      Diabetes Paternal Grandfather        History of Tobacco Use:  History   Smoking Status     Never Smoker   Smokeless Tobacco     Never Used       Current Medications:  Current Outpatient Medications   Medication Sig Dispense Refill     sertraline (ZOLOFT) 100 MG tablet TAKE 1 TABLET(100 MG) BY MOUTH DAILY (Patient taking differently: TAKE 1/2 TABLET(50 MG) BY MOUTH DAILY) 30 tablet 5     TRINESSA LO 0.18/0.215/0.25 MG-25 MCG per tablet TAKE 1 TABLET BY MOUTH DAILY 28 tablet 9       Allergies:  Allergies   Allergen Reactions     Penicillins Rash       ROS:  Pertinent items are noted in HPI.  All other systems are negative.  A comprehensive review of systems was negative.    PHYSICAL EXAM:     Vital signs: /76 (BP Location: Left arm, Patient Position: Sitting, Cuff Size: Adult Regular)   Pulse 86   Temp 96.9  F (36.1  C) (Tympanic)   Resp 14   Ht 1.6 m (5' 3\")   Wt 69.4 kg (153 lb)   LMP 03/12/2019 (Within Days)   SpO2 98%   BMI 27.10 kg/m     Weight: [unfilled]   BMI: Body mass index is " 27.1 kg/m .   General: Normal, healthy, cooperative, in no acute distress, alert, thin   Skin: no jaundice   HEENT: PERRLA, EOMI and Sclera clear, anicteric   Neck: supple, without adenopathy, full range of motion, small 0.4mm mole on right neck   Lungs: clear to auscultation   CV: Regular rate and rhythm without murmer   Abdominal: abdomen is soft without significant tenderness, masses, organomegaly or guarding   Extremities: No cyanosis, clubbing or edema noted bilaterally in Upper and Lower Extremities   Neurological: without deficit    ASSESSMENT: 33 year old female with a mass on the right neck.    PLAN: Discussed options with the patient.  Will plan on taking the patient to the [facility] Procedure Room for surgical excision.      The risks, benefits, and alternatives to the planned procedure were fully discussed with the patient and/or the patient's representative(s). The risks of bleeding, infection, death, missing pathology, the need for additional procedures intra-operatively, the possible need for intra-operative consults, the possible need for transfusion therapy, cardiopulmonary compromise, the possible need for additional surgery for a complication were discussed with the patient and/or the patient's representative(s). The patient's and/or patient's representative(s) questions were addressed and answered. Informed consent was obtained from the patient and/or the patient's representative(s). The patient and/or the patient's representative(s) consent to proceed.    The patient was taken to the minor procedure room.  The right neck was sterilely prepped and draped.  Local anesthesia was infiltrated under the lesion.  The lesion was removed with a 4 mm punch biopsy.  This was sent to pathology for pathological diagnosis.  Hemostasis was assured.  The wound was closed with 4-0 nylon.  Sterile dressings were applied.    The patient will follow up in 7-10 days for suture removal.

## 2019-04-02 NOTE — NURSING NOTE
"Chief Complaint   Patient presents with     Consult     mole removal right side of [facility] referring        Initial /76 (BP Location: Left arm, Patient Position: Sitting, Cuff Size: Adult Regular)   Pulse 86   Temp 96.9  F (36.1  C) (Tympanic)   Resp 14   Ht 1.6 m (5' 3\")   Wt 69.4 kg (153 lb)   LMP 03/12/2019 (Within Days)   SpO2 98%   BMI 27.10 kg/m   Estimated body mass index is 27.1 kg/m  as calculated from the following:    Height as of this encounter: 1.6 m (5' 3\").    Weight as of this encounter: 69.4 kg (153 lb).  Medication Reconciliation: complete    Analilia Cobos LPN    "

## 2019-04-04 LAB — COPATH REPORT: NORMAL

## 2019-08-16 DIAGNOSIS — Z30.011 ENCOUNTER FOR INITIAL PRESCRIPTION OF CONTRACEPTIVE PILLS: ICD-10-CM

## 2019-08-20 RX ORDER — NORGESTIMATE AND ETHINYL ESTRADIOL
KIT
Qty: 28 TABLET | Refills: 2 | Status: SHIPPED | OUTPATIENT
Start: 2019-08-20 | End: 2021-01-14

## 2019-10-07 NOTE — PROGRESS NOTES
Subjective     Rachel Villarreal is a 34 year old female who presents to clinic today for the following health issues:    HPI   Encounter for Family Planning      Duration: Prolapse 2 years ago    Description (location/character/radiation): wants to discuss risks of getting pregnant with previous pelvic floor dysfunction    Intensity:  none    Accompanying signs and symptoms: none    History (similar episodes/previous evaluation): None    Precipitating or alleviating factors: None    Therapies tried and outcome: None       Depression and Anxiety Follow-Up    How are you doing with your depression since your last visit? No change    How are you doing with your anxiety since your last visit?  No change    Are you having other symptoms that might be associated with depression or anxiety? No    Have you had a significant life event? No     Do you have any concerns with your use of alcohol or other drugs? No     Patient has been taking 25 mg of Zoloft and would like to continue with this dose of medication.    Social History     Tobacco Use     Smoking status: Never Smoker     Smokeless tobacco: Never Used   Substance Use Topics     Alcohol use: Yes     Alcohol/week: 0.0 standard drinks     Comment: rare     Drug use: No     PHQ 2017   PHQ-9 Total Score 0 0 0   Q9: Thoughts of better off dead/self-harm past 2 weeks Not at all Not at all Not at all     JOHANA-7 SCORE 2017   Total Score 0 0 0       Suicide Assessment Five-step Evaluation and Treatment (SAFE-T)    Patient Active Problem List   Diagnosis     ACP (advance care planning)     Mild episode of recurrent major depressive disorder (H)     Advance care planning     Supervision of other normal pregnancy, antepartum     Herpes simplex infection of genitourinary system     Encounter for triage in pregnant patient     Normal labor and delivery      (normal spontaneous vaginal delivery)     Past Surgical History:  "  Procedure Laterality Date     ENT SURGERY  2001    TONSILECTOMY     GYN SURGERY  2011    invetro fertilization     LAPAROSCOPIC APPENDECTOMY  06/10/2018    [facility]       Social History     Tobacco Use     Smoking status: Never Smoker     Smokeless tobacco: Never Used   Substance Use Topics     Alcohol use: Yes     Alcohol/week: 0.0 standard drinks     Comment: rare     Family History   Problem Relation Age of Onset     Hypertension Mother      Other Cancer Maternal Grandfather      Diabetes Paternal Grandfather          Current Outpatient Medications   Medication Sig Dispense Refill     sertraline (ZOLOFT) 25 MG tablet Take 1 tablet (25 mg) by mouth daily 30 tablet 2     TRI-LO-SPRINTEC 0.18/0.215/0.25 MG-25 MCG tablet TAKE 1 TABLET BY MOUTH DAILY 28 tablet 2     Allergies   Allergen Reactions     Penicillins Rash         Reviewed and updated as needed this visit by Provider         Review of Systems   ROS COMP: Constitutional, HEENT, cardiovascular, pulmonary, gi and gu systems are negative, except as otherwise noted.      Objective    BP (!) 142/84 (BP Location: Right arm, Patient Position: Sitting, Cuff Size: Adult Regular)   Pulse 84   Temp 98.6  F (37  C) (Tympanic)   Resp 16   Ht 1.6 m (5' 3\")   Wt 73.4 kg (161 lb 12.8 oz)   SpO2 98%   BMI 28.66 kg/m    Body mass index is 28.66 kg/m .  Physical Exam   GENERAL: healthy, alert and no distress  PSYCH: mentation appears normal, affect normal/bright    Diagnostic Test Results:  none         Assessment & Plan     1. Family planning  Will refer to OB/Gyn for more details discussion of pregnancy and birthing options with history of significant pelvic floor symptoms.  Follow-up here as directed.  - OB/GYN REFERRAL    2. Mild episode of recurrent major depressive disorder (H)  Refilled at dose patient is currently taking.  - sertraline (ZOLOFT) 25 MG tablet; Take 1 tablet (25 mg) by mouth daily  Dispense: 30 tablet; Refill: 2     BMI:   Estimated body mass " "index is 28.66 kg/m  as calculated from the following:    Height as of this encounter: 1.6 m (5' 3\").    Weight as of this encounter: 73.4 kg (161 lb 12.8 oz).         Return if symptoms worsen or fail to improve.    Mary Rankin MD  [facility]      "

## 2019-10-10 ENCOUNTER — OFFICE VISIT (OUTPATIENT)
Dept: FAMILY MEDICINE | Facility: OTHER | Age: 34
End: 2019-10-10
Attending: FAMILY MEDICINE
Payer: COMMERCIAL

## 2019-10-10 VITALS
OXYGEN SATURATION: 98 % | BODY MASS INDEX: 28.67 KG/M2 | SYSTOLIC BLOOD PRESSURE: 142 MMHG | TEMPERATURE: 98.6 F | DIASTOLIC BLOOD PRESSURE: 84 MMHG | HEIGHT: 63 IN | HEART RATE: 84 BPM | WEIGHT: 161.8 LBS | RESPIRATION RATE: 16 BRPM

## 2019-10-10 DIAGNOSIS — Z30.09 FAMILY PLANNING: Primary | ICD-10-CM

## 2019-10-10 DIAGNOSIS — F33.0 MILD EPISODE OF RECURRENT MAJOR DEPRESSIVE DISORDER (H): ICD-10-CM

## 2019-10-10 PROCEDURE — 99214 OFFICE O/P EST MOD 30 MIN: CPT | Performed by: FAMILY MEDICINE

## 2019-10-10 RX ORDER — SERTRALINE HYDROCHLORIDE 25 MG/1
25 TABLET, FILM COATED ORAL DAILY
Qty: 30 TABLET | Refills: 2 | Status: SHIPPED | OUTPATIENT
Start: 2019-10-10 | End: 2020-01-13

## 2019-10-10 ASSESSMENT — PAIN SCALES - GENERAL: PAINLEVEL: NO PAIN (0)

## 2019-10-10 ASSESSMENT — MIFFLIN-ST. JEOR: SCORE: 1398.05

## 2019-10-10 NOTE — NURSING NOTE
"Chief Complaint   Patient presents with     Contraception       Initial BP (!) 142/84 (BP Location: Right arm, Patient Position: Sitting, Cuff Size: Adult Regular)   Pulse 84   Temp 98.6  F (37  C) (Tympanic)   Resp 16   Ht 1.6 m (5' 3\")   Wt 73.4 kg (161 lb 12.8 oz)   SpO2 98%   BMI 28.66 kg/m   Estimated body mass index is 28.66 kg/m  as calculated from the following:    Height as of this encounter: 1.6 m (5' 3\").    Weight as of this encounter: 73.4 kg (161 lb 12.8 oz).  Medication Reconciliation: complete  Lizbeth Clarke MA  "

## 2019-11-08 ENCOUNTER — HEALTH MAINTENANCE LETTER (OUTPATIENT)
Age: 34
End: 2019-11-08

## 2019-12-12 DIAGNOSIS — A60.00 HERPES SIMPLEX INFECTION OF GENITOURINARY SYSTEM: Primary | ICD-10-CM

## 2019-12-12 RX ORDER — ACYCLOVIR 400 MG/1
400 TABLET ORAL 2 TIMES DAILY
Qty: 60 TABLET | Refills: 0 | Status: SHIPPED | OUTPATIENT
Start: 2019-12-12 | End: 2020-01-13

## 2019-12-12 NOTE — TELEPHONE ENCOUNTER
Acyclovir   Last Written Prescription Date:  11/26/18  Last Fill Quantity: 60,   # refills: 0  Last Office Visit: 10/10/19  Future Office visit:       Routing refill request to provider for review/approval because:  Drug not on the G, P or [facility] refill protocol or controlled substance

## 2020-01-13 ENCOUNTER — MYC REFILL (OUTPATIENT)
Dept: FAMILY MEDICINE | Facility: OTHER | Age: 35
End: 2020-01-13

## 2020-01-13 DIAGNOSIS — F33.0 MILD EPISODE OF RECURRENT MAJOR DEPRESSIVE DISORDER (H): ICD-10-CM

## 2020-01-13 DIAGNOSIS — A60.00 HERPES SIMPLEX INFECTION OF GENITOURINARY SYSTEM: ICD-10-CM

## 2020-01-15 RX ORDER — SERTRALINE HYDROCHLORIDE 25 MG/1
25 TABLET, FILM COATED ORAL DAILY
Qty: 30 TABLET | Refills: 2 | Status: SHIPPED | OUTPATIENT
Start: 2020-01-15 | End: 2020-04-09

## 2020-01-15 RX ORDER — ACYCLOVIR 400 MG/1
400 TABLET ORAL 2 TIMES DAILY
Qty: 60 TABLET | Refills: 0 | Status: SHIPPED | OUTPATIENT
Start: 2020-01-15 | End: 2021-06-28

## 2020-01-24 ENCOUNTER — MYC MEDICAL ADVICE (OUTPATIENT)
Dept: FAMILY MEDICINE | Facility: OTHER | Age: 35
End: 2020-01-24

## 2020-01-24 DIAGNOSIS — Z20.828 EXPOSURE TO INFLUENZA: Primary | ICD-10-CM

## 2020-01-24 RX ORDER — OSELTAMIVIR PHOSPHATE 75 MG/1
75 CAPSULE ORAL DAILY
Qty: 10 CAPSULE | Refills: 0 | Status: SHIPPED | OUTPATIENT
Start: 2020-01-24 | End: 2020-02-03

## 2020-03-26 ENCOUNTER — TELEPHONE (OUTPATIENT)
Dept: OBGYN | Facility: OTHER | Age: 35
End: 2020-03-26

## 2020-03-26 DIAGNOSIS — Z32.01 PREGNANCY TEST POSITIVE: Primary | ICD-10-CM

## 2020-03-26 NOTE — TELEPHONE ENCOUNTER
Jesus Alberto Klein Patient:     Positive pregnancy test : Yes  G:3    P:2  Vaginal or C/S:vaginal   LMP : 2/27/2020 GA: 4w  Prenatal vitamins?: Yes  Bleeding?: No, spotting once.   Cramping?: Yes, comparable to period cramps off and on  1-sided pelvic pain?: No   Advised patient to be seen ASAP if any of the above symptoms.    Order pended for dating US.     [place] appt scheduled with Jesus Alberto on- will schedule after US.

## 2020-04-02 ENCOUNTER — MYC REFILL (OUTPATIENT)
Dept: FAMILY MEDICINE | Facility: OTHER | Age: 35
End: 2020-04-02

## 2020-04-02 DIAGNOSIS — F33.0 MILD EPISODE OF RECURRENT MAJOR DEPRESSIVE DISORDER (H): ICD-10-CM

## 2020-04-02 RX ORDER — SERTRALINE HYDROCHLORIDE 100 MG/1
100 TABLET, FILM COATED ORAL DAILY
Qty: 30 TABLET | Refills: 1 | Status: SHIPPED | OUTPATIENT
Start: 2020-04-02 | End: 2020-04-09

## 2020-04-02 NOTE — TELEPHONE ENCOUNTER
zoloft      Last Written Prescription Date:  02/24/2020  Last Fill Quantity: 30,   # refills: 0  Last Office Visit: 10/10/2019

## 2020-04-17 ENCOUNTER — HOSPITAL ENCOUNTER (OUTPATIENT)
Dept: ULTRASOUND IMAGING | Facility: HOSPITAL | Age: 35
Discharge: HOME | End: 2020-04-17
Attending: ADVANCED PRACTICE MIDWIFE | Admitting: ADVANCED PRACTICE MIDWIFE
Payer: COMMERCIAL

## 2020-04-17 DIAGNOSIS — Z32.01 PREGNANCY TEST POSITIVE: ICD-10-CM

## 2020-04-17 PROCEDURE — 76817 TRANSVAGINAL US OBSTETRIC: CPT | Mod: TC

## 2020-04-20 DIAGNOSIS — Z32.01 PREGNANCY TEST POSITIVE: Primary | ICD-10-CM

## 2020-04-24 ENCOUNTER — HOSPITAL ENCOUNTER (OUTPATIENT)
Dept: ULTRASOUND IMAGING | Facility: HOSPITAL | Age: 35
Discharge: HOME | End: 2020-04-24
Attending: ADVANCED PRACTICE MIDWIFE | Admitting: ADVANCED PRACTICE MIDWIFE
Payer: COMMERCIAL

## 2020-04-24 DIAGNOSIS — Z32.01 PREGNANCY TEST POSITIVE: ICD-10-CM

## 2020-04-24 PROCEDURE — 76801 OB US < 14 WKS SINGLE FETUS: CPT | Mod: TC

## 2020-04-27 ENCOUNTER — TELEPHONE (OUTPATIENT)
Dept: OBGYN | Facility: OTHER | Age: 35
End: 2020-04-27

## 2020-04-27 NOTE — TELEPHONE ENCOUNTER
Pt states she needs to change appt on May 20; are there any updates from her scan this past Friday, April 24

## 2020-04-27 NOTE — TELEPHONE ENCOUNTER
appt changed to 5/19/20 at 9 am. Told pt. I will call her tomorrow with results of her USG.  Lennie Siu LPN

## 2020-05-16 ENCOUNTER — HOSPITAL ENCOUNTER (EMERGENCY)
Facility: HOSPITAL | Age: 35
Discharge: HOME | End: 2020-05-16
Attending: PHYSICIAN ASSISTANT | Admitting: PHYSICIAN ASSISTANT
Payer: COMMERCIAL

## 2020-05-16 VITALS
DIASTOLIC BLOOD PRESSURE: 98 MMHG | SYSTOLIC BLOOD PRESSURE: 139 MMHG | TEMPERATURE: 98.4 F | HEART RATE: 86 BPM | OXYGEN SATURATION: 100 % | RESPIRATION RATE: 16 BRPM

## 2020-05-16 DIAGNOSIS — O20.9 VAGINAL BLEEDING IN PREGNANCY, FIRST TRIMESTER: Primary | ICD-10-CM

## 2020-05-16 DIAGNOSIS — R03.0 ELEVATED BLOOD PRESSURE READING: ICD-10-CM

## 2020-05-16 LAB
ALBUMIN UR-MCNC: NEGATIVE MG/DL
APPEARANCE UR: CLEAR
B-HCG SERPL-ACNC: ABNORMAL IU/L (ref 0–5)
BILIRUB UR QL STRIP: NEGATIVE
COLOR UR AUTO: ABNORMAL
GLUCOSE UR STRIP-MCNC: NEGATIVE MG/DL
HGB UR QL STRIP: ABNORMAL
KETONES UR STRIP-MCNC: NEGATIVE MG/DL
LEUKOCYTE ESTERASE UR QL STRIP: NEGATIVE
NITRATE UR QL: NEGATIVE
PH UR STRIP: 7 PH (ref 4.7–8)
SOURCE: ABNORMAL
SP GR UR STRIP: 1 (ref 1–1.03)
UROBILINOGEN UR STRIP-MCNC: NORMAL MG/DL (ref 0–2)

## 2020-05-16 PROCEDURE — 81003 URINALYSIS AUTO W/O SCOPE: CPT | Performed by: PHYSICIAN ASSISTANT

## 2020-05-16 PROCEDURE — 84702 CHORIONIC GONADOTROPIN TEST: CPT | Performed by: PHYSICIAN ASSISTANT

## 2020-05-16 PROCEDURE — 99283 EMERGENCY DEPT VISIT LOW MDM: CPT | Mod: Z6 | Performed by: PHYSICIAN ASSISTANT

## 2020-05-16 PROCEDURE — 36415 COLL VENOUS BLD VENIPUNCTURE: CPT | Performed by: PHYSICIAN ASSISTANT

## 2020-05-16 PROCEDURE — 99283 EMERGENCY DEPT VISIT LOW MDM: CPT

## 2020-05-16 ASSESSMENT — ENCOUNTER SYMPTOMS
FREQUENCY: 0
DYSURIA: 0
DIZZINESS: 0
SHORTNESS OF BREATH: 0
GASTROINTESTINAL NEGATIVE: 1
FEVER: 0

## 2020-05-16 NOTE — ED NOTES
Discharge instructions reviewed with patient.  Patient verbalized understanding and has no further questions.  Patient will have lab draw on Monday and follow up with Jesus Alberto Klein on Tuesday.

## 2020-05-16 NOTE — ED PROVIDER NOTES
History     Chief Complaint   Patient presents with     Vaginal Bleeding     Pregnancy Complications     HPI  Rachel Carvajal is a 34 year old female who presents emergency department with complaints of vaginal bleeding that started today.  Patient noted that she had a small amount of dark blood on underwear earlier and has noted increase in brighter red blood within the past hour.  Total amount is still very small with no clots present.  Patient notes she is 10 weeks pregnant based on previous ultrasound.  She denies abdominal pain, fever.  Patient is blood type A pos.  Two prior children with no prior miscarriages.  No prior diagnosis of hypertension though looking back in her chart it looks as though her previous visit in October she did have a blood pressure in the 140s systolic.  She was not being treated for hypertension at that time.    Allergies:  Allergies   Allergen Reactions     Penicillins Rash       Problem List:    Patient Active Problem List    Diagnosis Date Noted      (normal spontaneous vaginal delivery) 2017     Priority: Medium     Encounter for triage in pregnant patient 2017     Priority: Medium     Normal labor and delivery 2017     Priority: Medium     Supervision of other normal pregnancy, antepartum 2017     Priority: Medium     Herpes simplex infection of genitourinary system 2017     Priority: Medium     Advance care planning 2016     Priority: Medium     Advance Care Planning 2016: ACP Review of Chart / Resources Provided:  Reviewed chart for advance care plan.  Rachel Villarreal has been provided information and resources to begin or update their advance care plan.  Added by Skylar Rocha           ACP (advance care planning) 2016     Priority: Medium     Advance Care Planning 2016: ACP Review of Chart / Resources Provided:  Reviewed chart for advance care plan.  Rachel Villarreal has no plan or code status on file.  Discussed available resources and provided with information. Confirmed code status reflects current choices pending further ACP discussions.  Confirmed/documented legally designated decision makers.  Added by Gayle Hutson             Mild episode of recurrent major depressive disorder (H) 09/06/2016     Priority: Medium        Past Medical History:    Past Medical History:   Diagnosis Date     Depressive disorder      Herpes simplex infection of genitourinary system 5/11/2017     Mild episode of recurrent major depressive disorder (H) 9/6/2016       Past Surgical History:    Past Surgical History:   Procedure Laterality Date     ENT SURGERY  2001    TONSILECTOMY     GYN SURGERY  2011    invetro fertilization     LAPAROSCOPIC APPENDECTOMY  06/10/2018           Family History:    Family History   Problem Relation Age of Onset     Hypertension Mother      Other Cancer Maternal Grandfather      Diabetes Paternal Grandfather        Social History:  Marital Status:   [2]  Social History     Tobacco Use     Smoking status: Never Smoker     Smokeless tobacco: Never Used   Substance Use Topics     Alcohol use: Not Currently     Alcohol/week: 0.0 standard drinks     Comment: rare     Drug use: No        Medications:    acyclovir (ZOVIRAX) 400 MG tablet  Prenatal MV-Min-Fe Fum-FA-DHA (PRENATAL 1 PO)  sertraline (ZOLOFT) 100 MG tablet  sertraline (ZOLOFT) 25 MG tablet  TRI-LO-SPRINTEC 0.18/0.215/0.25 MG-25 MCG tablet          Review of Systems   Constitutional: Negative for fever.   Respiratory: Negative for shortness of breath.    Gastrointestinal: Negative.    Genitourinary: Positive for vaginal bleeding. Negative for dysuria, frequency, pelvic pain, urgency, vaginal discharge and vaginal pain.   Neurological: Negative for dizziness.       Physical Exam   BP: (!) 147/101  Pulse: 86  Heart Rate: 75  Temp: 98.4  F (36.9  C)  Resp: 16  SpO2: 100 %      Physical Exam  Constitutional:       General: She is not in  acute distress.     Appearance: She is well-developed. She is not diaphoretic.   HENT:      Head: Normocephalic and atraumatic.   Eyes:      General: No scleral icterus.     Extraocular Movements: Extraocular movements intact.      Conjunctiva/sclera: Conjunctivae normal.      Pupils: Pupils are equal, round, and reactive to light.   Neck:      Musculoskeletal: Normal range of motion and neck supple.   Cardiovascular:      Rate and Rhythm: Normal rate.   Pulmonary:      Effort: Pulmonary effort is normal.   Abdominal:      Tenderness: There is no abdominal tenderness.   Genitourinary:     Comments: Exam deferred  Skin:     General: Skin is warm and dry.      Coloration: Skin is not pale.      Findings: No erythema or rash.   Neurological:      Mental Status: She is alert and oriented to person, place, and time.         ED Course        Procedures  Discussed with patient that vaginal bleeding was not uncommon in the first trimester of pregnancy but also discussed the possibility that this may be signs of an early miscarriage.  Patient has had 2 prior ultrasounds indicating intrauterine pregnancy.  No need for further imaging at this time.  Quantitative beta-hCG was obtained.  Patient was noted to have elevated blood pressure in the emergency department.  Urinalysis showed no protein in the urine.  Patient has follow-up appointment this week with OB.  Recommended she discuss with OB her elevated blood pressure and management of this.  Recommended  immediate return to the emergency room if severely worsening symptoms of bleeding, new abdominal pain, new fever.             Results for orders placed or performed during the hospital encounter of 05/16/20 (from the past 24 hour(s))   UA without Microscopic   Result Value Ref Range    Color Urine Straw     Appearance Urine Clear     Glucose Urine Negative NEG^Negative mg/dL    Bilirubin Urine Negative NEG^Negative    Ketones Urine Negative NEG^Negative mg/dL    Specific  Gravity Urine 1.003 1.003 - 1.035    Blood Urine Moderate (A) NEG^Negative    pH Urine 7.0 4.7 - 8.0 pH    Protein Albumin Urine Negative NEG^Negative mg/dL    Urobilinogen mg/dL Normal 0.0 - 2.0 mg/dL    Nitrite Urine Negative NEG^Negative    Leukocyte Esterase Urine Negative NEG^Negative    Source Midstream Urine    HCG quantitative pregnancy (blood)   Result Value Ref Range    HCG Quantitative Serum 15,949 (H) 0 - 5 IU/L       Medications - No data to display    Assessments & Plan (with Medical Decision Making)     I have reviewed the nursing notes.    I have reviewed the findings, diagnosis, plan and need for follow up with the patient.    Discharge Medication List as of 5/16/2020  5:16 PM          Final diagnoses:   Vaginal bleeding in pregnancy, first trimester   Elevated blood pressure reading     ABDULLAHI Villanueva on 5/17/2020 at 9:54 AM   5/16/2020   [facility]     Rick Mccracken PA  05/17/20 0955

## 2020-05-16 NOTE — DISCHARGE INSTRUCTIONS
Return for lab-only HCG test on Monday evening.  Return to ED immediately if new onset abdominal pain, lightheadedness, shortness of breath.

## 2020-05-16 NOTE — ED TRIAGE NOTES
Patient presents with concerns of vaginal bleeding.  Notes she is 10 weeks pregnant and 1 hour ago went to the bathroom and noted dark red blood in her underwear.  Denies any abdominal pain and/or cramping.  Notes this is her 3rd pregnancy and did not have any complications with her first pregnancies.

## 2020-05-16 NOTE — ED AVS SNAPSHOT
[facility]  [address]  [place] [address]  Phone:  [phone]                                    Rachel Carvajal   MRN: [id number]    Department:  [facility]   Date of Visit:  5/16/2020           After Visit Summary Signature Page    I have received my discharge instructions, and my questions have been answered. I have discussed any challenges I see with this plan with the nurse or doctor.    ..........................................................................................................................................  Patient/Patient Representative Signature      ..........................................................................................................................................  Patient Representative Print Name and Relationship to Patient    ..................................................               ................................................  Date                                   Time    ..........................................................................................................................................  Reviewed by Signature/Title    ...................................................              ..............................................  Date                                               Time          22EPIC Rev 08/18

## 2020-05-17 ENCOUNTER — MYC MEDICAL ADVICE (OUTPATIENT)
Dept: OBGYN | Facility: OTHER | Age: 35
End: 2020-05-17

## 2020-05-17 DIAGNOSIS — O46.90 VAGINAL BLEEDING DURING PREGNANCY: ICD-10-CM

## 2020-05-17 DIAGNOSIS — Z32.01 PREGNANCY TEST POSITIVE: Primary | ICD-10-CM

## 2020-05-18 ENCOUNTER — TELEPHONE (OUTPATIENT)
Dept: OBGYN | Facility: OTHER | Age: 35
End: 2020-05-18

## 2020-05-18 ENCOUNTER — MYC MEDICAL ADVICE (OUTPATIENT)
Dept: OBGYN | Facility: OTHER | Age: 35
End: 2020-05-18

## 2020-05-18 ENCOUNTER — NURSE TRIAGE (OUTPATIENT)
Dept: FAMILY MEDICINE | Facility: OTHER | Age: 35
End: 2020-05-18

## 2020-05-18 ENCOUNTER — TELEPHONE (OUTPATIENT)
Dept: OBGYN | Facility: CLINIC | Age: 35
End: 2020-05-18

## 2020-05-18 ENCOUNTER — HOSPITAL ENCOUNTER (OUTPATIENT)
Dept: ULTRASOUND IMAGING | Facility: HOSPITAL | Age: 35
Discharge: HOME | End: 2020-05-18
Attending: ADVANCED PRACTICE MIDWIFE | Admitting: ADVANCED PRACTICE MIDWIFE
Payer: COMMERCIAL

## 2020-05-18 DIAGNOSIS — O46.90 VAGINAL BLEEDING DURING PREGNANCY: ICD-10-CM

## 2020-05-18 DIAGNOSIS — O03.4 INCOMPLETE MISCARRIAGE: Primary | ICD-10-CM

## 2020-05-18 PROCEDURE — 76801 OB US < 14 WKS SINGLE FETUS: CPT | Mod: TC

## 2020-05-18 RX ORDER — DOXYCYCLINE HYCLATE 100 MG
100 TABLET ORAL 2 TIMES DAILY WITH MEALS
Qty: 14 TABLET | Refills: 0 | Status: SHIPPED | OUTPATIENT
Start: 2020-05-18 | End: 2021-01-14

## 2020-05-18 RX ORDER — MISOPROSTOL 200 UG/1
600 TABLET ORAL EVERY 4 HOURS
Qty: 9 TABLET | Refills: 1 | Status: SHIPPED | OUTPATIENT
Start: 2020-05-18 | End: 2020-05-19

## 2020-05-18 NOTE — TELEPHONE ENCOUNTER
Pt is calling today and states she was in the ER on Saturday night.  She feels that they did not do anything for her.  She has been bleeding and is wondering what to do. Pt was transferred to Triage nurse for further evaluation.

## 2020-05-18 NOTE — TELEPHONE ENCOUNTER
LVM regarding appts. Writer spoke with Jesus Alberto Klein regarding the Pt and she stated it was up to her on wanting a follow up appt. Gave bleeding precautions and number to call back if she does want an appt.

## 2020-05-18 NOTE — TELEPHONE ENCOUNTER
Notify patient and order an ultrasound.  Pt is scheduled for an appt tomorrow morning.  Will address increased B/P noted in ER.

## 2020-05-18 NOTE — TELEPHONE ENCOUNTER
Spoke with Pt regarding her bleeding and she stated it has not stopped. Pt is changing her pad every 4 hours and the bleeding is more of clots. Denies pelvic cramping or one sided pain but does have lower back pain which is not uncommon for her.   Pt was in the ER on 5/16 and had a quant and UA done. Pt is requesting an US and does have a future quant order pended.   Please advise.

## 2020-05-18 NOTE — TELEPHONE ENCOUNTER
I called patient about her US results which showed a fetal demise.  Discussed with patient about miscarriages, possible treatments and she opts for Cytotec completion.    PLAN:   Scripts put into [facility] for Cytotec, Doxycycline, Tylenol # 3

## 2020-05-18 NOTE — TELEPHONE ENCOUNTER
"Pt called and was in ED for vaginal bleeding and high BP. She is 10 weeks pregnant. She contineus to have vaginal bleeding and has gotten a little worse.Stringy blood clots. Size of a dime.Changing pad about every four hours.No abdominal pain.Slight HA.Updated OB nurse and she will speak with patient.    Filomena Lopez RN      Additional Information    Negative: Shock suspected (e.g., cold/pale/clammy skin, too weak to stand, low BP, rapid pulse)    Negative: Difficult to awaken or acting confused (e.g., disoriented, slurred speech)    Negative: Passed out (i.e., lost consciousness, collapsed and was not responding)    Negative: Sounds like a life-threatening emergency to the triager    Negative: [1] Vaginal bleeding AND [2] pregnant > 20 weeks    Negative: Not pregnant or pregnancy status unknown    Negative: SEVERE abdominal pain    Negative: [1] SEVERE vaginal bleeding (i.e., soaking 2 pads / hour, large blood clots) AND [2] present 2 or more hours    Negative: SEVERE dizziness (e.g., unable to stand, requires support to walk, feels like passing out)    Negative: [1] MODERATE vaginal bleeding (i.e., soaking 1 pad / hour; clots) AND [2] present > 6 hours    Negative: [1] MODERATE vaginal bleeding (i.e., soaking 1 pad / hour; clots) AND [2] pregnant > 12 weeks    Negative: Passed tissue (e.g., gray-white)    Negative: Shoulder pain    Negative: Pale skin (pallor) of new onset or worsening    Negative: Patient sounds very sick or weak to the triager    Negative: [1] Constant abdominal pain AND [2] present > 2 hours    Negative: Fever > 100.4 F (38.0 C)    Negative: [1] Intermittent lower abdominal pain (e.g., cramping) AND [2] present > 24 hours    Negative: Prior history of \"ectopic pregnancy\" or previous tubal surgery (e.g., tubal ligation)    Negative: Pain or burning with passing urine (urination)    Negative: MODERATE vaginal bleeding (i.e., soaking 1 pad / hour; clots)    Negative: Has IUD    MILD vaginal " "bleeding (i.e., less than 1 pad / hour; less than patient's usual menstrual bleeding; not just spotting)    Answer Assessment - Initial Assessment Questions  1. ONSET: \"When did this bleeding start?\"        Saturday around 2p.m  2. DESCRIPTION: \"Describe the bleeding that you are having.\" \"How much bleeding is there?\"     - SPOTTING: spotting, or pinkish / brownish mucous discharge; does not fill panti-liner or pad     - MILD:  less than 1 pad / hour; less than patient's usual menstrual bleeding    - MODERATE: 1-2 pads / hour; small-medium blood clots (e.g., pea, grape, small coin)     - SEVERE: soaking 2 or more pads/hour for 2 or more hours; bleeding not contained by pads or continuous red blood from vagina; large blood clots (e.g., golf ball, large coin)       Moderate-clots are stringing a dime  3. ABDOMINAL PAIN SEVERITY: If present, ask: \"How bad is it?\"  (e.g., Scale 1-10; mild, moderate, or severe)    - MILD (1-3): doesn't interfere with normal activities, abdomen soft and not tender to touch     - MODERATE (4-7): interferes with normal activities or awakens from sleep, tender to touch     - SEVERE (8-10): excruciating pain, doubled over, unable to do any normal activities      no  4. PREGNANCY: \"Do you know how many weeks or months pregnant you are?\" \"When was the first day of your last normal menstrual period?\"      10 weeks  5. HEMODYNAMIC STATUS: \"Are you weak or feeling lightheaded?\" If so, ask: \"Can you stand and walk normally?\"       Slight headache  6. OTHER SYMPTOMS: \"What other symptoms are you having with the bleeding?\" (e.g., passed tissue, vaginal discharge, fever, menstrual-type cramps)      No.lower back sore    Protocols used: PREGNANCY - VAGINAL BLEEDING LESS THAN 20 WEEKS EGA-A-AH      "

## 2020-10-09 ENCOUNTER — TELEPHONE (OUTPATIENT)
Dept: OBGYN | Facility: OTHER | Age: 35
End: 2020-10-09

## 2020-10-09 DIAGNOSIS — Z32.01 PREGNANCY TEST POSITIVE: Primary | ICD-10-CM

## 2020-10-09 NOTE — TELEPHONE ENCOUNTER
Jesus Alberto Klein Patient:     Positive pregnancy test : Yes       P:2  Vaginal or C/S:vaginal   LMP : 2020 GA: 4w2d  Prenatal vitamins?: Yes   Bleeding?: No  Cramping?: No  1-sided pelvic pain?: No   Advised patient to be seen ASAP if any of the above symptoms.    Order pended for dating US.     Flaquito appt scheduled with Jesus Alberto on- Will schedule after US

## 2020-10-13 DIAGNOSIS — O20.0 MISCARRIAGE, THREATENED, EARLY PREGNANCY: Primary | ICD-10-CM

## 2020-10-13 NOTE — PROGRESS NOTES
Pt positive pregnancy test and was scheduled for confirmation US next week.  Reports light bleeding and possible tissue discharge today.  Quantitative hCG ordered.  Bleeding parameters discussed including seeking medical attention if soaking a pad in an hour for more than 2 hours.  Also seek medical attention for one sided pelvic pain or severe pelvic pain.    Jesus Alberto Klein, RAVINDRA, CNM

## 2020-11-02 ENCOUNTER — OFFICE VISIT (OUTPATIENT)
Dept: FAMILY MEDICINE | Facility: OTHER | Age: 35
End: 2020-11-02
Attending: FAMILY MEDICINE
Payer: COMMERCIAL

## 2020-11-02 ENCOUNTER — NURSE TRIAGE (OUTPATIENT)
Dept: FAMILY MEDICINE | Facility: OTHER | Age: 35
End: 2020-11-02

## 2020-11-02 DIAGNOSIS — Z20.822 EXPOSURE TO COVID-19 VIRUS: Primary | ICD-10-CM

## 2020-11-02 DIAGNOSIS — Z20.822 EXPOSURE TO COVID-19 VIRUS: ICD-10-CM

## 2020-11-02 PROCEDURE — U0003 INFECTIOUS AGENT DETECTION BY NUCLEIC ACID (DNA OR RNA); SEVERE ACUTE RESPIRATORY SYNDROME CORONAVIRUS 2 (SARS-COV-2) (CORONAVIRUS DISEASE [COVID-19]), AMPLIFIED PROBE TECHNIQUE, MAKING USE OF HIGH THROUGHPUT TECHNOLOGIES AS DESCRIBED BY CMS-2020-01-R: HCPCS | Performed by: FAMILY MEDICINE

## 2020-11-02 NOTE — TELEPHONE ENCOUNTER
"Discharge Instructions for COVID-19 Patients  You have--or may have--COVID-19. Please follow the instructions listed below.   If you have a weakened immune system, discuss with your doctor any other actions you need to take.  How can I protect others?  If you have symptoms (fever, cough, body aches or trouble breathing):    Stay home and away from others (self-isolate) until:  ? At least 10 days have passed since your symptoms started, And   ? You've had no fever--and no medicine that reduces fever--for 1 full day (24 hours), And    ? Your other symptoms have resolved (gotten better).  If you don't show symptoms, but testing showed that you have COVID-19:    Stay home and away from others (self-isolate). Follow the tips under \"How do I self-isolate?\" below for 10 days (20 days if you have a weak immune system).    You don't need to be retested for COVID-19 before going back to school or work. As long as you're fever-free and feeling better, you can go back to school, work and other activities after waiting the 10 or 20 days.   How do I self-isolate?    Stay in your own room, even for meals. Use your own bathroom if you can.    Stay away from others in your home. No hugging, kissing or shaking hands. No visitors.    Don't go to work, school or anywhere else.    Clean \"high touch\" surfaces often (doorknobs, counters, handles). Use household cleaning spray or wipes. You'll find a full list of  on the EPA website: www.epa.gov/pesticide-registration/list-n-disinfectants-use-against-sars-cov-2.    Cover your mouth and nose with a mask or other face covering to avoid spreading germs.    Wash your hands and face often. Use soap and water.    Caregivers in these groups are at risk for severe illness due to COVID-19:  ? People 65 years and older  ? People who live in a nursing home or long-term care facility  ? People with chronic disease (lung, heart, cancer, diabetes, kidney, liver, immunologic)  ? People who have a " weakened immune system, including those who:    Are in cancer treatment    Take medicine that weakens the immune system, such as corticosteroids    Had a bone marrow or organ transplant    Have an immune deficiency    Have poorly controlled HIV or AIDS    Are obese (body mass index of 40 or higher)    Smoke regularly    Caregivers should wear gloves while washing dishes, handling laundry and cleaning bedrooms and bathrooms.    Use caution when washing and drying laundry: Don't shake dirty laundry and use the warmest water setting that you can.    For more tips on managing your health at home, go to www.cdc.gov/coronavirus/2019-ncov/downloads/10Things.pdf.  How can I take care of myself at home?  1. Get lots of rest. Drink extra fluids (unless a doctor has told you not to).    2. Take Tylenol (acetaminophen) for fever or pain. If you have liver or kidney problems, ask your family doctor if it's okay to take Tylenol.     Adults can take either:  ? 650 mg (two 325 mg pills) every 4 to 6 hours, or   ? 1,000 mg (two 500 mg pills) every 8 hours as needed.  ? Note: Don't take more than 3,000 mg in one day. Acetaminophen is found in many medicines (both prescribed and over-the-counter medicines). Read all labels to be sure you don't take too much.   For children, check the Tylenol bottle for the right dose. The dose is based on the child's age or weight.  3. If you have other health problems (like cancer, heart failure, an organ transplant or severe kidney disease): Call your specialty clinic if you don't feel better in the next 2 days.    4. Know when to call 911. Emergency warning signs include:  ? Trouble breathing or shortness of breath  ? Pain or pressure in the chest that doesn't go away  ? Feeling confused like you haven't felt before, or not being able to wake up  ? Bluish-colored lips or face    5. Your doctor may have prescribed a blood thinner medicine. Follow their instructions.  Where can I get more  information?    [facility] - About COVID-19: [email or web address]    CDC - What to Do If You're Sick: www.cdc.gov/coronavirus/2019-ncov/about/steps-when-sick.html    CDC - Ending Home Isolation: www.cdc.gov/coronavirus/2019-ncov/hcp/disposition-in-home-patients.html    CDC - Caring for Someone: www.cdc.gov/coronavirus/2019-ncov/if-you-are-sick/care-for-someone.html    [facility] - Interim Guidance for Hospital Discharge to Home: www.health.[facility].[place]./diseases/coronavirus/hcp/hospdischarge.pdf    [facility] clinical trials (COVID-19 research studies): clinicalaffairs.[facility]-clinical-trials    Below are the COVID-19 hotlines at the [place] Department of Health ([facility]). Interpreters are available.  ? For health questions: Call 975-827-0174 or 1-718.507.1686 (7 a.m. to 7 p.m.)  ? For questions about schools and childcare: Call 012-428-7004 or 1-133.834.7277 (7 a.m. to 7 p.m.)    For informational purposes only. Not to replace the advice of your health care provider. Clinically reviewed by the Infection Prevention Team. Copyright   2020 [facility]. All rights reserved. [employer] 178712 - REV 08/04/20.      Instructions for Patients  It is recommended that you have a test for coronavirus (COVID-19). This illness can cause fever, cough and trouble breathing. Many people get a mild case and get better on their own. Some people can get very sick.     Please follow these steps:    1. We will call to schedule your test.  2. A member of our care team will ask you some questions. Then, they will use a swab to collect samples from your nose and throat.     Our testing team will send you your test results.    How can I protect others?    Stay home and away from others (self-isolate) until:    You ve had no fever--and no medicine that reduces fever--for 1 full day (24 hours). And      Your other symptoms have resolved (gotten better). For example, your cough or breathing has improved.  And     At least 10 days have passed since your symptoms started.    Stay at least 6 feet away from others. (If someone will drive you to your test, stay in the backseat, as far away from the  as you can.)     Don t go to work, school or anywhere else. When it s time for your test, go straight to the testing site. Don t make any stops on the way there or back.     Wash your hands and face often. Use soap and water.     Cover your mouth and nose with a mask, tissue or washcloth.     Don t touch anyone. No hugging, kissing or handshakes.    How can I take care of myself?    1. Get lots of rest. Drink extra fluids (unless a doctor has told you not to).     2. Take Tylenol (acetaminophen) for fever or pain. If you have liver or kidney problems, ask your family doctor if it's okay to take Tylenol.     Adults can take either:     650 mg (two 325 mg pills) every 4 to 6 hours, or     1,000 mg (two 500 mg pills) every 8 hours as needed.     Note: Don't take more than 3,000 mg in one day.   Acetaminophen is found in many medicines (both prescribed and over-the-counter medicines). Read all labels to be sure you don't take too much.   For children, check the Tylenol bottle for the right dose. The dose is based on  the child's age or weight.    3. If you have other health problems (like cancer, heart failure, an organ transplant or severe kidney disease): Call your specialty clinic if you don't feel better in the next 2 days.    4. Know when to call 911: If your breathing is so bad that it keeps you from doing normal activities, call 911 or go to the emergency room. Tell them that you've been staying home and may have COVID-19.      Thank you for taking steps to prevent the spread of this virus.  o Limit your contact with others.  o Wear a simple mask to cover your cough.  o Wash your hands well and often.  o If you need medical care, go to [email or web address] or contact your health care provider.     For more about COVID-19 and  caring for yourself at home, visit the CDC website at https://www.cdc.gov/coronavirus/2019-ncov/about/steps-when-sick.html.     To learn about care at [facility], please go to [email or web address].     [facility] clinical trials (COVID-19 research studies): clinicalaffairs.[facility]-clinical-trials.    Below are the COVID-19 hotlines at the [place] Department of Health ([facility]). Interpreters are available.     For health questions: Call 354-637-9083 or 1-811.422.7659 (7 a.m. to 7 p.m.)    For questions about schools and childcare: Call 690-767-8073 or 1-807.115.2619 (7 a.m. to 7 p.m.)      COVID 19 Nurse Triage Plan/Patient Instructions    Please be aware that novel coronavirus (COVID-19) may be circulating in the community. If you develop symptoms such as fever, cough, or SOB or if you have concerns about the presence of another infection including coronavirus (COVID-19), please contact your health care provider or visit [email or web address].     Disposition/Instructions    Home care recommended. Follow home care protocol based instructions.    Thank you for taking steps to prevent the spread of this virus.  o Limit your contact with others.  o Wear a simple mask to cover your cough.  o Wash your hands well and often.    Resources    [facility] [place]: About COVID-19: [email or web address]    CDC: What to Do If You're Sick: www.cdc.gov/coronavirus/2019-ncov/about/steps-when-sick.html    CDC: Ending Home Isolation: www.cdc.gov/coronavirus/2019-ncov/hcp/disposition-in-home-patients.html     CDC: Caring for Someone: www.cdc.gov/coronavirus/2019-ncov/if-you-are-sick/care-for-someone.html     [facility]: Interim Guidance for Hospital Discharge to Home: www.health.[facility].[place]/diseases/coronavirus/hcp/hospdischarge.pdf    [facility] clinical trials (COVID-19 research studies): clinicalaffairs.[facility]/umn-clinical-trials     Below are the COVID-19 hotlines at the  [facility] ([facility]). Interpreters are available.   o For health questions: Call 184-336-2776 or 1-587.290.4095 (7 a.m. to 7 p.m.)  o For questions about schools and childcare: Call 425-487-3264 or 1-428.365.8895 (7 a.m. to 7 p.m.)                       Reason for Disposition    [1] COVID-19 EXPOSURE (Close Contact) AND [2] within last 14 days BUT [3] NO symptoms    Additional Information    Negative: COVID-19 has been diagnosed by a healthcare provider (HCP)    Negative: COVID-19 lab test positive    Negative: [1] Symptoms of COVID-19 (e.g., cough, fever, SOB, or others) AND [2] lives in an area with community spread    Negative: [1] Symptoms of COVID-19 (e.g., cough, fever, SOB, or others) AND [2] within 14 days of EXPOSURE (close contact) with diagnosed or suspected COVID-19 patient    Negative: [1] Symptoms of COVID-19 (e.g., cough, fever, SOB, or others) AND [2] within 14 days of travel from high-risk area for COVID-19 community spread (identified by CDC)    Negative: [1] Difficulty breathing (shortness of breath) occurs AND [2] onset > 14 days after COVID-19 EXPOSURE (Close Contact) AND [3] no community spread where patient lives    Negative: [1] Dry cough occurs AND [2] onset > 14 days after COVID-19 EXPOSURE AND [3] no community spread where patient lives    Negative: [1] Wet cough (i.e., white-yellow, yellow, green, or mere colored sputum) AND [2] onset > 14 days after COVID-19 EXPOSURE AND [3] no community spread where patient lives    Negative: [1] Common cold symptoms AND [2] onset > 14 days after COVID-19 EXPOSURE AND [3] no community spread where patient lives    Negative: [1] COVID-19 EXPOSURE (Close Contact) within last 14 days AND [2] needs COVID-19 lab test to return to work AND [3] NO symptoms    Negative: [1] COVID-19 EXPOSURE (Close Contact) within last 14 days AND [2] exposed person is a healthcare worker who was NOT using all recommended personal protective equipment (i.e., a  "respirator-N95 mask, eye protection, gloves, and gown) AND [3] NO symptoms    Answer Assessment - Initial Assessment Questions  1. CLOSE CONTACT: \"Who is the person with the confirmed or suspected COVID-19 infection that you were exposed to?\"       chiled  2. PLACE of CONTACT: \"Where were you when you were exposed to COVID-19?\" (e.g., home, school, medical waiting room; which city?)      home  3. TYPE of CONTACT: \"How much contact was there?\" (e.g., sitting next to, live in same house, work in same office, same building)     sitting and standing next to  4. DURATION of CONTACT: \"How long were you in contact with the COVID-19 patient?\" (e.g., a few seconds, passed by person, a few minutes, live with the patient)      8 horus  5. DATE of CONTACT: \"When did you have contact with a COVID-19 patient?\" (e.g., how many days ago)     10.26.2020  6. TRAVEL: \"Have you traveled out of the country recently?\" If so, \"When and where?\"      * Also ask about out-of-state travel, since the CDC has identified some high-risk cities for community spread in the .      * Note: Travel becomes less relevant if there is widespread community transmission where the patient lives.     no  7. COMMUNITY SPREAD: \"Are there lots of cases of COVID-19 (community spread) where you live?\" (See public health department website, if unsure)        yes  8. SYMPTOMS: \"Do you have any symptoms?\" (e.g., fever, cough, breathing difficulty)     no  9. PREGNANCY OR POSTPARTUM: \"Is there any chance you are pregnant?\" \"When was your last menstrual period?\" \"Did you deliver in the last 2 weeks?\"      no  10. HIGH RISK: \"Do you have any heart or lung problems? Do you have a weak immune system?\" (e.g., CHF, COPD, asthma, HIV positive, chemotherapy, renal failure, diabetes mellitus, sickle cell anemia)       no    Protocols used: CORONAVIRUS (COVID-19) EXPOSURE-A- 8.4.20      "

## 2020-11-03 LAB
SARS-COV-2 RNA SPEC QL NAA+PROBE: NOT DETECTED
SPECIMEN SOURCE: NORMAL

## 2020-12-06 ENCOUNTER — HEALTH MAINTENANCE LETTER (OUTPATIENT)
Age: 35
End: 2020-12-06

## 2020-12-08 ENCOUNTER — TELEPHONE (OUTPATIENT)
Dept: OBGYN | Facility: OTHER | Age: 35
End: 2020-12-08

## 2020-12-08 DIAGNOSIS — Z32.01 PREGNANCY TEST POSITIVE: Primary | ICD-10-CM

## 2020-12-08 NOTE — TELEPHONE ENCOUNTER
Jesus Alberto Klein Patient:     Positive pregnancy test : yes       P:2  Vaginal or C/S:vaginal x2  LMP :  GA: 3w4d  Prenatal vitamins?: yes   Bleeding?: no  Cramping?: no  1-sided pelvic pain?: no     Started baby Asprin today     Pt is wondering about quant HCG due to her h/o miscarriages.     Advised patient to be seen ASAP if any of the above symptoms.    Order pended for dating US.     Flaquito manzanot scheduled with Jesus Alberto on- Will schedule after US

## 2020-12-29 ENCOUNTER — HOSPITAL ENCOUNTER (OUTPATIENT)
Dept: ULTRASOUND IMAGING | Facility: HOSPITAL | Age: 35
Discharge: HOME | End: 2020-12-29
Attending: ADVANCED PRACTICE MIDWIFE | Admitting: ADVANCED PRACTICE MIDWIFE
Payer: COMMERCIAL

## 2020-12-29 DIAGNOSIS — Z32.01 PREGNANCY TEST POSITIVE: ICD-10-CM

## 2020-12-29 PROCEDURE — 76802 OB US < 14 WKS ADDL FETUS: CPT

## 2021-01-14 ENCOUNTER — PRENATAL OFFICE VISIT (OUTPATIENT)
Dept: OBGYN | Facility: OTHER | Age: 36
End: 2021-01-14
Attending: ADVANCED PRACTICE MIDWIFE
Payer: COMMERCIAL

## 2021-01-14 VITALS
HEIGHT: 63 IN | WEIGHT: 153 LBS | SYSTOLIC BLOOD PRESSURE: 125 MMHG | DIASTOLIC BLOOD PRESSURE: 78 MMHG | BODY MASS INDEX: 27.11 KG/M2

## 2021-01-14 DIAGNOSIS — Z11.3 SCREEN FOR STD (SEXUALLY TRANSMITTED DISEASE): ICD-10-CM

## 2021-01-14 DIAGNOSIS — O09.891 SUPERVISION OF OTHER HIGH RISK PREGNANCIES, FIRST TRIMESTER: Primary | ICD-10-CM

## 2021-01-14 DIAGNOSIS — Z12.4 ENCOUNTER FOR SCREENING FOR CERVICAL CANCER: ICD-10-CM

## 2021-01-14 DIAGNOSIS — F33.0 MILD EPISODE OF RECURRENT MAJOR DEPRESSIVE DISORDER (H): ICD-10-CM

## 2021-01-14 DIAGNOSIS — O30.041 DICHORIONIC DIAMNIOTIC TWIN PREGNANCY IN FIRST TRIMESTER: ICD-10-CM

## 2021-01-14 PROBLEM — Z36.89 ENCOUNTER FOR TRIAGE IN PREGNANT PATIENT: Status: RESOLVED | Noted: 2017-11-25 | Resolved: 2021-01-14

## 2021-01-14 PROBLEM — Z34.80 SUPERVISION OF OTHER NORMAL PREGNANCY, ANTEPARTUM: Status: RESOLVED | Noted: 2017-05-11 | Resolved: 2021-01-14

## 2021-01-14 PROBLEM — O30.049 DICHORIONIC DIAMNIOTIC TWIN PREGNANCY: Status: ACTIVE | Noted: 2021-01-14

## 2021-01-14 PROBLEM — K35.30 ACUTE APPENDICITIS WITH LOCALIZED PERITONITIS: Status: ACTIVE | Noted: 2018-06-09

## 2021-01-14 PROBLEM — O03.4 INCOMPLETE MISCARRIAGE: Status: RESOLVED | Noted: 2020-05-18 | Resolved: 2021-01-14

## 2021-01-14 LAB
SPECIMEN SOURCE: NORMAL
WET PREP SPEC: NORMAL

## 2021-01-14 PROCEDURE — G0123 SCREEN CERV/VAG THIN LAYER: HCPCS | Performed by: ADVANCED PRACTICE MIDWIFE

## 2021-01-14 PROCEDURE — 87624 HPV HI-RISK TYP POOLED RSLT: CPT | Performed by: ADVANCED PRACTICE MIDWIFE

## 2021-01-14 PROCEDURE — 87210 SMEAR WET MOUNT SALINE/INK: CPT | Performed by: ADVANCED PRACTICE MIDWIFE

## 2021-01-14 PROCEDURE — 87491 CHLMYD TRACH DNA AMP PROBE: CPT | Performed by: ADVANCED PRACTICE MIDWIFE

## 2021-01-14 PROCEDURE — 87591 N.GONORRHOEAE DNA AMP PROB: CPT | Performed by: ADVANCED PRACTICE MIDWIFE

## 2021-01-14 PROCEDURE — 99207 PR PRENATAL VISIT: CPT | Performed by: ADVANCED PRACTICE MIDWIFE

## 2021-01-14 PROCEDURE — 76817 TRANSVAGINAL US OBSTETRIC: CPT | Performed by: ADVANCED PRACTICE MIDWIFE

## 2021-01-14 ASSESSMENT — MIFFLIN-ST. JEOR: SCORE: 1353.13

## 2021-01-14 ASSESSMENT — PAIN SCALES - GENERAL: PAINLEVEL: NO PAIN (0)

## 2021-01-14 NOTE — PATIENT INSTRUCTIONS
Return to office in 2 week(s) for prenatal care and as needed.    If you think your bag of water is broke; have bleeding like a period; think your in labor; or are worried about your baby's movement; please call the labor and delivery unit @ [phone].    Thank you for allowing Jesus Alberto WAITE CNM and our OB team to participate in your care.  If you have a scheduling or an appointment question please contact  [facility] Coordinator at their direct line [phone].   ALL nursing questions or concerns can be directed to your OB nurse at: [phone] Ross Stanley/Lennie

## 2021-01-14 NOTE — NURSING NOTE
"Chief Complaint   Patient presents with     Prenatal Care     8w6d       Initial /78 (BP Location: Left arm, Patient Position: Chair, Cuff Size: Adult Regular)   Ht 1.6 m (5' 3\")   Wt 69.4 kg (153 lb)   LMP 11/13/2020   Breastfeeding Unknown   BMI 27.10 kg/m   Estimated body mass index is 27.1 kg/m  as calculated from the following:    Height as of this encounter: 1.6 m (5' 3\").    Weight as of this encounter: 69.4 kg (153 lb).  Medication Reconciliation: complete  Lizeth Sinha LPN    "

## 2021-01-15 LAB
C TRACH DNA SPEC QL NAA+PROBE: NOT DETECTED
N GONORRHOEA DNA SPEC QL NAA+PROBE: NOT DETECTED
SPECIMEN SOURCE: NORMAL

## 2021-01-19 LAB
COPATH REPORT: NORMAL
PAP: NORMAL

## 2021-01-20 LAB
FINAL DIAGNOSIS: NORMAL
HPV HR 12 DNA CVX QL NAA+PROBE: NEGATIVE
HPV16 DNA SPEC QL NAA+PROBE: NEGATIVE
HPV18 DNA SPEC QL NAA+PROBE: NEGATIVE
SPECIMEN DESCRIPTION: NORMAL
SPECIMEN SOURCE CVX/VAG CYTO: NORMAL

## 2021-01-21 ENCOUNTER — NURSE TRIAGE (OUTPATIENT)
Dept: FAMILY MEDICINE | Facility: OTHER | Age: 36
End: 2021-01-21

## 2021-01-21 DIAGNOSIS — Z20.822 COVID-19 RULED OUT: Primary | ICD-10-CM

## 2021-01-21 NOTE — TELEPHONE ENCOUNTER
Exposure to covid 19 to a family member testing positive for covid 19. See protocol for details, patient is asymptomatic.     covid testing:    Next 5 appointments (look out 90 days)    Jan 22, 2021  9:00 AM  (Arrive by 8:45 AM)  SHORT with [place] [facility]  [facility] [address] [address]  [phone]   Jan 26, 2021  3:30 PM  (Arrive by 3:15 PM)  New Prenatal with RAVINDRA Mccord CNM  [facility] [address] [address] [phone]            Reason for Disposition    [1] COVID-19 EXPOSURE AND [2] 15 or more days ago AND [3] NO symptoms    Additional Information    Negative: COVID-19 lab test positive    Negative: [1] Lives with someone known to have influenza (flu test positive) AND [2] flu-like symptoms (e.g., cough, runny nose, sore throat, SOB; with or without fever)    Negative: [1] Symptoms of COVID-19 (e.g., cough, fever, SOB, or others) AND [2] HCP diagnosed COVID-19 based on symptoms    Negative: [1] Symptoms of COVID-19 (e.g., cough, fever, SOB, or others) AND [2] lives in an area with community spread    Negative: [1] Symptoms of COVID-19 (e.g., cough, fever, SOB, or others) AND [2] within 14 days of EXPOSURE (close contact) with diagnosed or suspected COVID-19 patient    Negative: [1] Symptoms of COVID-19 (e.g., cough, fever, SOB, or others) AND [2] within 14 days of travel from high-risk area for COVID-19 community spread (identified by CDC)    Negative: [1] Difficulty breathing (shortness of breath) occurs AND [2] onset > 14 days after COVID-19 EXPOSURE (Close Contact)    Negative: [1] Dry cough occurs AND [2] onset > 14 days after COVID-19 EXPOSURE    Negative: [1] Wet cough (i.e., white-yellow, yellow, green, or mere colored sputum) AND [2] onset > 14 days after COVID-19 EXPOSURE    Negative:  "[1] Common cold symptoms AND [2] onset > 14 days after COVID-19 EXPOSURE    Negative: COVID-19 vaccine reaction suspected (e.g., fever, headache, muscle aches) occurring during days 1-3 after getting vaccine    Negative: COVID-19 vaccine, questions about    Negative: [1] CLOSE CONTACT COVID-19 EXPOSURE within last 14 days AND [2] needs COVID-19 lab test to return to work AND [3] NO symptoms    Negative: [1] CLOSE CONTACT COVID-19 EXPOSURE within last 14 days AND [2] exposed person is a  (e.g., police or paramedic) AND [3] NO symptoms    Negative: [1] CLOSE CONTACT COVID-19 EXPOSURE within last 14 days AND [2] exposed person is a healthcare worker who was NOT using all recommended personal protective equipment (i.e., a respirator-N95 mask, eye protection, gloves, and gown) AND [3] NO symptoms    Negative: [1] Living or working in a correctional facility, long-term care facility, or shelter (i.e., congregate setting; densely populated) AND [2] where an outbreak has occurred AND [3] NO symptoms    Negative: [1] CLOSE CONTACT COVID-19 EXPOSURE within last 14 days AND [2] NO symptoms    Answer Assessment - Initial Assessment Questions  1. COVID-19 CLOSE CONTACT: \"Who is the person with the confirmed or suspected COVID-19 infection that you were exposed to?\"      Family member    2. PLACE of CONTACT: \"Where were you when you were exposed to COVID-19?\" (e.g., home, school, medical waiting room; which city?)      [place]    3. TYPE of CONTACT: \"How much contact was there?\" (e.g., sitting next to, live in same house, work in same office, same building)      Visiting in a cabin     4. DURATION of CONTACT: \"How long were you in contact with the COVID-19 patient?\" (e.g., a few seconds, passed by person, a few minutes, 15 minutes or longer, live with the patient)      X 3 days stayed in cabin with the family member    5. MASK: \"Were you wearing a mask?\" \"Was the other person wearing a mask?\" Note: wearing a mask " "reduces the risk of an otherwise close contact.      No     6. DATE of CONTACT: \"When did you have contact with a COVID-19 patient?\" (e.g., how many days ago)      1/15-1/17/21    7. COMMUNITY SPREAD: \"Are there lots of cases of COVID-19 (community spread) where you live?\" (See public health department website, if unsure)        Yes     8. SYMPTOMS: \"Do you have any symptoms?\" (e.g., fever, cough, breathing difficulty, loss of taste or smell)      No     9. PREGNANCY OR POSTPARTUM: \"Is there any chance you are pregnant?\" \"When was your last menstrual period?\" \"Did you deliver in the last 2 weeks?\"      yes     10. HIGH RISK: \"Do you have any heart or lung problems? Do you have a weak immune system?\" (e.g., heart failure, COPD, asthma, HIV positive, chemotherapy, renal failure, diabetes mellitus, sickle cell anemia, obesity)        No     11. TRAVEL: \"Have you traveled out of the country recently?\" If so, \"When and where?\" Also ask about out-of-state travel, since the CDC has identified some high-risk cities for community spread in the  Note: Travel becomes less relevant if there is widespread community transmission where the patient lives.        No    Protocols used: CORONAVIRUS (COVID-19) EXPOSURE-A- 1.3      "

## 2021-01-22 ENCOUNTER — OFFICE VISIT (OUTPATIENT)
Dept: FAMILY MEDICINE | Facility: OTHER | Age: 36
End: 2021-01-22
Attending: FAMILY MEDICINE
Payer: COMMERCIAL

## 2021-01-22 DIAGNOSIS — Z20.822 COVID-19 RULED OUT: ICD-10-CM

## 2021-01-22 PROCEDURE — U0005 INFEC AGEN DETEC AMPLI PROBE: HCPCS | Performed by: FAMILY MEDICINE

## 2021-01-22 PROCEDURE — U0003 INFECTIOUS AGENT DETECTION BY NUCLEIC ACID (DNA OR RNA); SEVERE ACUTE RESPIRATORY SYNDROME CORONAVIRUS 2 (SARS-COV-2) (CORONAVIRUS DISEASE [COVID-19]), AMPLIFIED PROBE TECHNIQUE, MAKING USE OF HIGH THROUGHPUT TECHNOLOGIES AS DESCRIBED BY CMS-2020-01-R: HCPCS | Performed by: FAMILY MEDICINE

## 2021-01-22 PROCEDURE — 99207 PR NO CHARGE NURSE ONLY: CPT

## 2021-01-23 LAB
SARS-COV-2 RNA RESP QL NAA+PROBE: NOT DETECTED
SPECIMEN SOURCE: NORMAL

## 2021-01-26 ENCOUNTER — PRENATAL OFFICE VISIT (OUTPATIENT)
Dept: OBGYN | Facility: OTHER | Age: 36
End: 2021-01-26
Attending: ADVANCED PRACTICE MIDWIFE
Payer: COMMERCIAL

## 2021-01-26 VITALS
BODY MASS INDEX: 27.64 KG/M2 | WEIGHT: 156 LBS | HEIGHT: 63 IN | DIASTOLIC BLOOD PRESSURE: 68 MMHG | SYSTOLIC BLOOD PRESSURE: 115 MMHG

## 2021-01-26 DIAGNOSIS — O30.049 DICHORIONIC DIAMNIOTIC TWIN PREGNANCY, ANTEPARTUM: ICD-10-CM

## 2021-01-26 DIAGNOSIS — O09.521 MULTIGRAVIDA OF ADVANCED MATERNAL AGE IN FIRST TRIMESTER: ICD-10-CM

## 2021-01-26 DIAGNOSIS — Z23 NEED FOR PROPHYLACTIC VACCINATION AND INOCULATION AGAINST INFLUENZA: ICD-10-CM

## 2021-01-26 DIAGNOSIS — O09.91 SUPERVISION OF HIGH RISK PREGNANCY IN FIRST TRIMESTER: Primary | ICD-10-CM

## 2021-01-26 LAB
ALBUMIN UR-MCNC: NEGATIVE MG/DL
APPEARANCE UR: CLEAR
BILIRUB UR QL STRIP: NEGATIVE
COLOR UR AUTO: YELLOW
ERYTHROCYTE [DISTWIDTH] IN BLOOD BY AUTOMATED COUNT: 12.6 % (ref 10–15)
GLUCOSE UR STRIP-MCNC: NEGATIVE MG/DL
HCT VFR BLD AUTO: 34.8 % (ref 35–47)
HGB BLD-MCNC: 12.2 G/DL (ref 11.7–15.7)
HGB UR QL STRIP: ABNORMAL
KETONES UR STRIP-MCNC: NEGATIVE MG/DL
LEUKOCYTE ESTERASE UR QL STRIP: NEGATIVE
MCH RBC QN AUTO: 29.8 PG (ref 26.5–33)
MCHC RBC AUTO-ENTMCNC: 35.1 G/DL (ref 31.5–36.5)
MCV RBC AUTO: 85 FL (ref 78–100)
NITRATE UR QL: NEGATIVE
NON-SQ EPI CELLS #/AREA URNS LPF: NORMAL /LPF
PH UR STRIP: 6 PH (ref 5–7)
PLATELET # BLD AUTO: 251 10E9/L (ref 150–450)
RBC # BLD AUTO: 4.1 10E12/L (ref 3.8–5.2)
RBC #/AREA URNS AUTO: NORMAL /HPF
SOURCE: ABNORMAL
SP GR UR STRIP: <=1.005 (ref 1–1.03)
UROBILINOGEN UR STRIP-ACNC: 0.2 EU/DL (ref 0.2–1)
WBC # BLD AUTO: 8.4 10E9/L (ref 4–11)
WBC #/AREA URNS AUTO: NORMAL /HPF

## 2021-01-26 PROCEDURE — 80306 DRUG TEST PRSMV INSTRMNT: CPT | Performed by: ADVANCED PRACTICE MIDWIFE

## 2021-01-26 PROCEDURE — 86762 RUBELLA ANTIBODY: CPT | Performed by: ADVANCED PRACTICE MIDWIFE

## 2021-01-26 PROCEDURE — 90471 IMMUNIZATION ADMIN: CPT | Performed by: ADVANCED PRACTICE MIDWIFE

## 2021-01-26 PROCEDURE — 87389 HIV-1 AG W/HIV-1&-2 AB AG IA: CPT | Performed by: ADVANCED PRACTICE MIDWIFE

## 2021-01-26 PROCEDURE — 36415 COLL VENOUS BLD VENIPUNCTURE: CPT | Performed by: ADVANCED PRACTICE MIDWIFE

## 2021-01-26 PROCEDURE — 81001 URINALYSIS AUTO W/SCOPE: CPT | Mod: 59 | Performed by: ADVANCED PRACTICE MIDWIFE

## 2021-01-26 PROCEDURE — 86850 RBC ANTIBODY SCREEN: CPT | Performed by: ADVANCED PRACTICE MIDWIFE

## 2021-01-26 PROCEDURE — 76815 OB US LIMITED FETUS(S): CPT | Performed by: ADVANCED PRACTICE MIDWIFE

## 2021-01-26 PROCEDURE — 99207 PR FIRST OB VISIT: CPT | Performed by: ADVANCED PRACTICE MIDWIFE

## 2021-01-26 PROCEDURE — 87340 HEPATITIS B SURFACE AG IA: CPT | Performed by: ADVANCED PRACTICE MIDWIFE

## 2021-01-26 PROCEDURE — 86901 BLOOD TYPING SEROLOGIC RH(D): CPT | Performed by: ADVANCED PRACTICE MIDWIFE

## 2021-01-26 PROCEDURE — 85027 COMPLETE CBC AUTOMATED: CPT | Performed by: ADVANCED PRACTICE MIDWIFE

## 2021-01-26 PROCEDURE — 86900 BLOOD TYPING SEROLOGIC ABO: CPT | Performed by: ADVANCED PRACTICE MIDWIFE

## 2021-01-26 PROCEDURE — 90686 IIV4 VACC NO PRSV 0.5 ML IM: CPT | Performed by: ADVANCED PRACTICE MIDWIFE

## 2021-01-26 PROCEDURE — 86780 TREPONEMA PALLIDUM: CPT | Mod: 90 | Performed by: ADVANCED PRACTICE MIDWIFE

## 2021-01-26 ASSESSMENT — ANXIETY QUESTIONNAIRES
2. NOT BEING ABLE TO STOP OR CONTROL WORRYING: NOT AT ALL
1. FEELING NERVOUS, ANXIOUS, OR ON EDGE: NOT AT ALL
5. BEING SO RESTLESS THAT IT IS HARD TO SIT STILL: NOT AT ALL
6. BECOMING EASILY ANNOYED OR IRRITABLE: NOT AT ALL
3. WORRYING TOO MUCH ABOUT DIFFERENT THINGS: NOT AT ALL
4. TROUBLE RELAXING: NOT AT ALL
GAD7 TOTAL SCORE: 0
7. FEELING AFRAID AS IF SOMETHING AWFUL MIGHT HAPPEN: NOT AT ALL

## 2021-01-26 ASSESSMENT — PAIN SCALES - GENERAL: PAINLEVEL: NO PAIN (0)

## 2021-01-26 ASSESSMENT — MIFFLIN-ST. JEOR: SCORE: 1366.74

## 2021-01-26 ASSESSMENT — PATIENT HEALTH QUESTIONNAIRE - PHQ9: SUM OF ALL RESPONSES TO PHQ QUESTIONS 1-9: 0

## 2021-01-26 NOTE — PROGRESS NOTES
NEW OB VISIT  Rachel Carvajal is a 35 year old  at 10w4d presenting for a new ob visit.      Currently taking Prenatal Vitamins? y  Folate y    ZIKA n    MEDICAL HISTORY:  Diabetes: No  Hypertension: No  Heart Disease: No  Autoimmune disorder: No  Kidney Disease/UTI: No  Neurologic Disease/Epilepsy:No  Psychiatric Disease: No  Depression/Postpartum Depression:Yes, depression and PP depression  Varicositites/Phlebitis: No  Hepatitis/Liver Disease: No  Thyroid Dysfunction: No  Trauma/Violence: No  History of Blood Transfusion: No  Tobacco Use: No  Alcohol Use: No  Illicit/Recreational Drugs: No  D (Rh Sensitized): unknown  Pulmonary Disease (TB/Asthma): No  Drug/Latex Allergies/Reactions: Yes, PCN  Breast: No  GYN Surgery:  IVF with 1st child with now ex-  Operations/Hospitalizations:Yes, tonsillectomy, appy, wisdom teeth extraction  Anesthetic Complications: No  History of Abnormal Pap:No  Uterine Anomalies/ANIL: No  Infertility: No  Artifical Reproductive Technologies Treatment: No  Relevant Family History:No  Other/Comments: No    INFECTION HISTORY:  Are you exposed to TB anywhere you work or live?: n  Do you or your Partner have Genital Herpes: y self  Rash or viral illness or fever since LMP: n  Hepatitis B or C: n  History of STI (Gonorrhea, Chlamydia, HPV, HIV, Syphilis): n  Other: n  Cats n    BABY DOC [place]             Breast feeding: unsure  Card given y      IMMUNIZATION HISTORY:  Chicken Pox: y  Flu Vaccine:  n  Pneumococcal if smoker or Reactive Airway Disease:  n  Tdap: 28 w  HPV vaccinations (Gardasil): n  Other/comments: n    FAMILY HISTORY  Diabetes: pgf  Hypertension: mother  CVA/Stroke: n  Lupus: n  Cancers: Breast  n ovarian n,colon n,uterine: n           Genetics Screening/Teratology Counseling:  Includes Patient, Baby's Father, or anyone in either family with:  Patient's age 35 years or older as of estimated date of delivery:  y  Thalassemia: MCV less than 80: n  Neural Tube  "defects: n  Congenital Heart Defects: n  Down syndrome: n  Leno-Sachs: n  Canavan Disease: n  Familial Dysautonomia: n  Sickle Cell Disease or Trait: n  Hemophilia or other blood disorders: n  Muscular Dystrophy: n  Cystic Fibrosis: n  [place]'s Chorea: n  Intellectual development disorder or Autism: FOB's paternal uncle is mentally handicapped/cause unknown to pt  Other genetic or chromosomal disorders: n  Maternal Metabolic Disorder (Type 1 DM, PKU): n  Patient or baby's father with birth defects not listed above: n  Recurrent pregnancy loss or stillbirth: n  Medications (Supplements, drugs)/ Illicit/ Recreational drugs/ Alcohol since LMP: n  Other/Comments: n    Review Of Systems:   CONSTITUTIONAL:     NEGATIVE for fever, chills, change in weight  INTEGUMENTARY/SKIN:       NEGATIVE for worrisome rashes, moles or lesions  EYES:     NEGATIVE for vision changes or irritation  ENT/MOUTH: NEGATIVE for ear, mouth and throat problems  RESP:     NEGATIVE for significant cough or SOB  CV:   NEGATIVE for chest pain, palpitations or peripheral edema  GI:     NEGATIVE for unusual nausea, abdominal pain, heartburn, or change in bowel   :   NEGATIVE for frequency, dysuria, hematuria, vaginal discharge or bleeding  MUSCULOSKELETAL:     NEGATIVE for significant arthralgias or myalgia  NEURO:      NEGATIVE for weakness, dizziness or paresthesias  ENDOCRINE:      NEGATIVE for temperature intolerance, skin/hair changes  PSYCHIATRIC:      NEGATIVE for changes in mood or affect.     PHYSICAL EXAM:   /68 (BP Location: Left arm, Patient Position: Chair, Cuff Size: Adult Regular)   Ht 1.6 m (5' 3\")   Wt 70.8 kg (156 lb)   LMP 11/13/2020   BMI 27.63 kg/m     BMI: Body mass index is 27.63 kg/m .  Constitutional: healthy, alert and no distress  Head: Normocephalic. No masses, lesions, tenderness or abnormalities  Neck: Neck supple. Trachea midline. No adenopathy. Thyroid symmetric, normal size.   Cardiovascular: RRR. "   Respiratory: lungs clear   Breast: Breasts reveal mild symmetric fibrocystic densities, but there are no dominant, discrete, fixed or suspicious masses found.  Gastrointestinal: Abdomen soft, non-tender, non-distended. No masses, organomegaly.  Pelvic:  Deferred:  Completed at last visit  Rectal Exam: deferred  Musculoskeletal: extremities normal  Skin: no suspicious lesions or rashes  Psychiatric: Affect appropriate, cooperative,mentation appears normal.     Risk assessment done. Level is   moderate    ASSESSMENT:   G 5 P 2 @ 10 w 4 d  Gayla Twins  AMA  Hx of  x 2  Hx of genital HSV  Hx of depression/ taking Zoloft  Need of influenza vaccine  Doppler:  Fetal cardiac activity and movement by both babies    PLAN:  Prenatal labs   DNA screening  15-16 wk MSAFP  Level II Ultrasound at 20 weeks   Tdap at 27 weeks  Estimated Fetal Weight at 38 weeks prn     Flu shot today  Doppler for fetal cardiac activity  Return to Office:  4 weeks for prenatal care and as needed    Greater than 45 were spent in face to face counseling and interview by me for this initial new ob visit.  Jesus Alberto WAITE, CNM

## 2021-01-26 NOTE — NURSING NOTE
"Chief Complaint   Patient presents with     Prenatal Care     10w4d       Initial /68 (BP Location: Left arm, Patient Position: Chair, Cuff Size: Adult Regular)   Ht 1.6 m (5' 3\")   Wt 70.8 kg (156 lb)   LMP 11/13/2020   BMI 27.63 kg/m   Estimated body mass index is 27.63 kg/m  as calculated from the following:    Height as of this encounter: 1.6 m (5' 3\").    Weight as of this encounter: 70.8 kg (156 lb).  Medication Reconciliation: complete  Liezth Sinha LPN    "

## 2021-01-26 NOTE — PATIENT INSTRUCTIONS
Return to office in 4 week(s) for prenatal care and as needed.    If you think your bag of water is broke; have bleeding like a period; think your in labor; or are worried about your baby's movement; please call the labor and delivery unit @ [phone].    Thank you for allowing Jesus Alberto WAITE CNM and our OB team to participate in your care.  If you have a scheduling or an appointment question please contact  [facility] Unit Coordinator at their direct line [phone].   ALL nursing questions or concerns can be directed to your OB nurse at: [phone] Ross Stanley/Lennie

## 2021-01-27 DIAGNOSIS — F33.0 MILD EPISODE OF RECURRENT MAJOR DEPRESSIVE DISORDER (H): ICD-10-CM

## 2021-01-27 LAB
ABO + RH BLD: NORMAL
ABO + RH BLD: NORMAL
AMPHETAMINES UR QL: NOT DETECTED NG/ML
BARBITURATES UR QL SCN: NOT DETECTED NG/ML
BENZODIAZ UR QL SCN: NOT DETECTED NG/ML
BLD GP AB SCN SERPL QL: NORMAL
BLOOD BANK CMNT PATIENT-IMP: NORMAL
BUPRENORPHINE UR QL: NOT DETECTED NG/ML
CANNABINOIDS UR QL: NOT DETECTED NG/ML
COCAINE UR QL SCN: NOT DETECTED NG/ML
D-METHAMPHET UR QL: NOT DETECTED NG/ML
HBV SURFACE AG SERPL QL IA: NONREACTIVE
HIV 1+2 AB+HIV1 P24 AG SERPL QL IA: NONREACTIVE
METHADONE UR QL SCN: NOT DETECTED NG/ML
OPIATES UR QL SCN: NOT DETECTED NG/ML
OXYCODONE UR QL SCN: NOT DETECTED NG/ML
PCP UR QL SCN: NOT DETECTED NG/ML
PROPOXYPH UR QL: NOT DETECTED NG/ML
SPECIMEN EXP DATE BLD: NORMAL
TRICYCLICS UR QL SCN: NOT DETECTED NG/ML

## 2021-01-27 ASSESSMENT — ANXIETY QUESTIONNAIRES: GAD7 TOTAL SCORE: 0

## 2021-01-27 NOTE — TELEPHONE ENCOUNTER
Zoloft      Last Written Prescription Date:  04/09/20  Last Fill Quantity: 30,   # refills: 2  Last Office Visit: 10/10/19  Future Office visit:    Next 5 appointments (look out 90 days)    Feb 23, 2021  4:15 PM  (Arrive by 4:00 PM)  [facility] [address] [id number] [phone]           Routing refill request to provider for review/approval because:  Medication is reported/historical

## 2021-01-28 LAB
RUBV IGG SERPL IA-ACNC: 13 IU/ML
T PALLIDUM AB SER QL: NONREACTIVE

## 2021-01-29 RX ORDER — SERTRALINE HYDROCHLORIDE 100 MG/1
TABLET, FILM COATED ORAL
Qty: 30 TABLET | Refills: 2 | Status: SHIPPED | OUTPATIENT
Start: 2021-01-29 | End: 2021-06-11

## 2021-02-01 ENCOUNTER — MYC MEDICAL ADVICE (OUTPATIENT)
Dept: OBGYN | Facility: OTHER | Age: 36
End: 2021-02-01

## 2021-02-01 LAB — LAB SCANNED RESULT: NORMAL

## 2021-02-15 ENCOUNTER — MEDICAL CORRESPONDENCE (OUTPATIENT)
Dept: HEALTH INFORMATION MANAGEMENT | Facility: CLINIC | Age: 36
End: 2021-02-15

## 2021-02-15 DIAGNOSIS — O30.049 DICHORIONIC DIAMNIOTIC TWIN PREGNANCY, ANTEPARTUM: Primary | ICD-10-CM

## 2021-02-15 NOTE — MR AVS SNAPSHOT
"              After Visit Summary   2017    Rachel Villarreal    MRN: [id number]           Patient Information     Date Of Birth          1985        Visit Information        Provider Department      2017 2:15 PM Mary Rankin MD [facility]        Today's Diagnoses     Encounter for supervision of other normal pregnancy in second trimester           Follow-ups after your visit        Follow-up notes from your care team     Return in about 4 weeks (around 2017).      Who to contact     If you have questions or need follow up information about today's clinic visit or your schedule please contact [facility] directly at [phone].  Normal or non-critical lab and imaging results will be communicated to you by MyChart, letter or phone within 4 business days after the clinic has received the results. If you do not hear from us within 7 days, please contact the clinic through MyChart or phone. If you have a critical or abnormal lab result, we will notify you by phone as soon as possible.  Submit refill requests through SendtoNews or call your pharmacy and they will forward the refill request to us. Please allow 3 business days for your refill to be completed.          Additional Information About Your Visit        MyChart Information     SendtoNews lets you send messages to your doctor, view your test results, renew your prescriptions, schedule appointments and more. To sign up, go to [email or web address] . Click on \"Log in\" on the left side of the screen, which will take you to the Welcome page. Then click on \"Sign up Now\" on the right side of the page.     You will be asked to enter the access code listed below, as well as some personal information. Please follow the directions to create your username and password.     Your access code is: [email or web address]  Expires: 2017  5:43 PM     Your access code will  in 90 days. If you need help or a new code, please call your " " Anesthesia Evaluation     Patient summary reviewed and Nursing notes reviewed                Airway   Mallampati: II  TM distance: >3 FB  Neck ROM: full  No difficulty expected  Dental - normal exam     Comment: Caps    Pulmonary - normal exam   (+) shortness of breath, sleep apnea,     ROS comment: No CPAP  Cardiovascular - normal exam    ECG reviewed  Rhythm: regular  Rate: normal    (+) hypertension less than 2 medications, dysrhythmias PVC, Paroxysmal Atrial Fib, hyperlipidemia,     ROS comment: RBBB/LAFB/sounds like a hx of PAF (\"irregular heartbeat\" per pt) had ablation years ago and normal since    Neuro/Psych  (+) seizures well controlled, numbness, psychiatric history,       ROS Comment: Peripheral neuropathy  GI/Hepatic/Renal/Endo    (+) obesity,  hiatal hernia, GERD,  liver disease fatty liver disease, diabetes mellitus type 2, thyroid problem hypothyroidism    ROS Comment: Hx C. Diff colitis    Musculoskeletal     (+) back pain, chronic pain, myalgias,       ROS comment: Fibromyalgia/hx lumbar fusion/lumbar spinal stenosis with neurogenic claudication  Abdominal   (+) obese,    Substance History      OB/GYN          Other   arthritis, autoimmune disease rheumatoid arthritis,        Other Comment: Hx West Nile fever                Anesthesia Plan    ASA 3     general     intravenous induction     Anesthetic plan, all risks, benefits, and alternatives have been provided, discussed and informed consent has been obtained with: patient.    Plan discussed with CRNA.      " [facility] or [phone].        Care EveryWhere ID     This is your Care EveryWhere ID. This could be used by other organizations to access your [place] medical records  [id number]        Your Vitals Were     Last Period BMI (Body Mass Index)                02/26/2017 29.76 kg/m2           Blood Pressure from Last 3 Encounters:   08/09/17 100/64   07/10/17 100/64   06/08/17 92/62    Weight from Last 3 Encounters:   08/09/17 168 lb (76.2 kg)   07/10/17 161 lb (73 kg)   06/08/17 161 lb (73 kg)              Today, you had the following     No orders found for display         Today's Medication Changes          These changes are accurate as of: 8/9/17  3:08 PM.  If you have any questions, ask your nurse or doctor.               These medicines have changed or have updated prescriptions.        Dose/Directions    sertraline 50 MG tablet   Commonly known as:  ZOLOFT   This may have changed:  See the new instructions.   Used for:  Mild episode of recurrent major depressive disorder (H)        TAKE 1 TABLET BY MOUTH EVERY DAY   Quantity:  90 tablet   Refills:  0                Primary Care Provider Office Phone # Fax #    Mary Rankin [phone] [address] [address]        Equal Access to Services     FRANKIE HAGAN AH: Nabeel nguyeno Somarco a, waaxda luqadaha, qaybta kaalmada adeegyada, corrine katz. So [facility] [phone].    ATENCIÓN: Si habla español, tiene a davis disposición servicios gratuitos de asistencia lingüística. Llame al [phone].    We comply with applicable federal civil rights laws and [place] laws. We do not discriminate on the basis of race, color, national origin, age, disability sex, sexual orientation or gender identity.            Thank you!     Thank you for choosing [facility]  for your care. Our goal is always to provide you with excellent care. Hearing back from our patients is one  way we can continue to improve our services. Please take a few minutes to complete the written survey that you may receive in the mail after your visit with us. Thank you!             Your Updated Medication List - Protect others around you: Learn how to safely use, store and throw away your medicines at www.disposemymeds.org.          This list is accurate as of: 8/9/17  3:08 PM.  Always use your most recent med list.                   Brand Name Dispense Instructions for use Diagnosis    PRENATAL VITAMINS PO      Reported on 5/11/2017        sertraline 50 MG tablet    ZOLOFT    90 tablet    TAKE 1 TABLET BY MOUTH EVERY DAY    Mild episode of recurrent major depressive disorder (H)       valACYclovir 500 MG tablet    VALTREX     Take 500 mg by mouth daily as needed Reported on 5/11/2017

## 2021-02-17 NOTE — PROGRESS NOTES
"    Assessment & Plan     1. Mild episode of recurrent major depressive disorder (H)  No changes to current medications.  Follow-up in six months, sooner as needed.       BMI:   Estimated body mass index is 27.94 kg/m  as calculated from the following:    Height as of this encounter: 1.6 m (5' 3\").    Weight as of this encounter: 71.5 kg (157 lb 11.2 oz).       Return in about 6 months (around 8/23/2021) for Medication review.    Mary Rankin MD  [facility] [place] EMELYN Ramos is a 35 year old who presents for the following health issues     HPI       Depression Followup    How are you doing with your depression since your last visit? No change    Are you having other symptoms that might be associated with depression? No    Have you had a significant life event?  OTHER: pregnancy     Are you feeling anxious or having panic attacks?   No    Do you have any concerns with your use of alcohol or other drugs? No    Social History     Tobacco Use     Smoking status: Never Smoker     Smokeless tobacco: Never Used   Substance Use Topics     Alcohol use: Not Currently     Alcohol/week: 0.0 standard drinks     Comment: rare     Drug use: No     PHQ 6/25/2018 1/26/2021 2/23/2021   PHQ-9 Total Score 0 0 0   Q9: Thoughts of better off dead/self-harm past 2 weeks Not at all Not at all Not at all     JOHANA-7 SCORE 6/25/2018 1/26/2021 2/23/2021   Total Score 0 0 0     Last PHQ-9 2/23/2021   1.  Little interest or pleasure in doing things 0   2.  Feeling down, depressed, or hopeless 0   3.  Trouble falling or staying asleep, or sleeping too much 0   4.  Feeling tired or having little energy 0   5.  Poor appetite or overeating 0   6.  Feeling bad about yourself 0   7.  Trouble concentrating 0   8.  Moving slowly or restless 0   Q9: Thoughts of better off dead/self-harm past 2 weeks 0   PHQ-9 Total Score 0   Difficulty at work, home, or with people Not difficult at all     JOHANA-7  2/23/2021   1. " "Feeling nervous, anxious, or on edge 0   2. Not being able to stop or control worrying 0   3. Worrying too much about different things 0   4. Trouble relaxing 0   5. Being so restless that it is hard to sit still 0   6. Becoming easily annoyed or irritable 0   7. Feeling afraid, as if something awful might happen 0   JOHANA-7 Total Score 0   If you checked any problems, how difficult have they made it for you to do your work, take care of things at home, or get along with other people? Not difficult at all       Suicide Assessment Five-step Evaluation and Treatment (SAFE-T)      How many servings of fruits and vegetables do you eat daily?  4 or more    On average, how many sweetened beverages do you drink each day (Examples: soda, juice, sweet tea, etc.  Do NOT count diet or artificially sweetened beverages)?   0    How many days per week do you exercise enough to make your heart beat faster? 7    How many minutes a day do you exercise enough to make your heart beat faster? 30 - 60    How many days per week do you miss taking your medication? 0      Review of Systems   Constitutional, HEENT, cardiovascular, pulmonary, gi and gu systems are negative, except as otherwise noted.      Objective    /66 (BP Location: Right arm, Patient Position: Sitting, Cuff Size: Adult Regular)   Pulse 88   Temp 98.6  F (37  C) (Tympanic)   Resp 16   Ht 1.6 m (5' 3\")   Wt 71.5 kg (157 lb 11.2 oz)   LMP 11/13/2020   SpO2 99%   BMI 27.94 kg/m    Body mass index is 27.94 kg/m .  Physical Exam   GENERAL: healthy, alert and no distress  PSYCH: mentation appears normal, affect normal/bright            "

## 2021-02-23 ENCOUNTER — OFFICE VISIT (OUTPATIENT)
Dept: FAMILY MEDICINE | Facility: OTHER | Age: 36
End: 2021-02-23
Attending: FAMILY MEDICINE
Payer: COMMERCIAL

## 2021-02-23 ENCOUNTER — PRENATAL OFFICE VISIT (OUTPATIENT)
Dept: OBGYN | Facility: OTHER | Age: 36
End: 2021-02-23
Attending: ADVANCED PRACTICE MIDWIFE
Payer: COMMERCIAL

## 2021-02-23 VITALS
SYSTOLIC BLOOD PRESSURE: 108 MMHG | RESPIRATION RATE: 16 BRPM | TEMPERATURE: 98.6 F | HEIGHT: 63 IN | DIASTOLIC BLOOD PRESSURE: 66 MMHG | OXYGEN SATURATION: 99 % | BODY MASS INDEX: 27.94 KG/M2 | WEIGHT: 157.7 LBS | HEART RATE: 88 BPM

## 2021-02-23 VITALS
HEIGHT: 63 IN | BODY MASS INDEX: 27.94 KG/M2 | HEART RATE: 88 BPM | TEMPERATURE: 98.6 F | RESPIRATION RATE: 16 BRPM | OXYGEN SATURATION: 99 % | WEIGHT: 157.7 LBS | DIASTOLIC BLOOD PRESSURE: 66 MMHG | SYSTOLIC BLOOD PRESSURE: 108 MMHG

## 2021-02-23 DIAGNOSIS — O30.041 DICHORIONIC DIAMNIOTIC TWIN PREGNANCY IN FIRST TRIMESTER: Primary | ICD-10-CM

## 2021-02-23 DIAGNOSIS — F33.0 MILD EPISODE OF RECURRENT MAJOR DEPRESSIVE DISORDER (H): Primary | ICD-10-CM

## 2021-02-23 PROCEDURE — 76815 OB US LIMITED FETUS(S): CPT | Performed by: ADVANCED PRACTICE MIDWIFE

## 2021-02-23 PROCEDURE — 99213 OFFICE O/P EST LOW 20 MIN: CPT | Performed by: FAMILY MEDICINE

## 2021-02-23 PROCEDURE — 99207 PR PRENATAL VISIT: CPT | Performed by: ADVANCED PRACTICE MIDWIFE

## 2021-02-23 ASSESSMENT — ANXIETY QUESTIONNAIRES
4. TROUBLE RELAXING: NOT AT ALL
2. NOT BEING ABLE TO STOP OR CONTROL WORRYING: NOT AT ALL
6. BECOMING EASILY ANNOYED OR IRRITABLE: NOT AT ALL
IF YOU CHECKED OFF ANY PROBLEMS ON THIS QUESTIONNAIRE, HOW DIFFICULT HAVE THESE PROBLEMS MADE IT FOR YOU TO DO YOUR WORK, TAKE CARE OF THINGS AT HOME, OR GET ALONG WITH OTHER PEOPLE: NOT DIFFICULT AT ALL
5. BEING SO RESTLESS THAT IT IS HARD TO SIT STILL: NOT AT ALL
3. WORRYING TOO MUCH ABOUT DIFFERENT THINGS: NOT AT ALL
7. FEELING AFRAID AS IF SOMETHING AWFUL MIGHT HAPPEN: NOT AT ALL
1. FEELING NERVOUS, ANXIOUS, OR ON EDGE: NOT AT ALL
GAD7 TOTAL SCORE: 0

## 2021-02-23 ASSESSMENT — PAIN SCALES - GENERAL
PAINLEVEL: NO PAIN (0)
PAINLEVEL: NO PAIN (0)

## 2021-02-23 ASSESSMENT — PATIENT HEALTH QUESTIONNAIRE - PHQ9: SUM OF ALL RESPONSES TO PHQ QUESTIONS 1-9: 0

## 2021-02-23 ASSESSMENT — MIFFLIN-ST. JEOR
SCORE: 1374.45
SCORE: 1374.45

## 2021-02-23 NOTE — NURSING NOTE
"Chief Complaint   Patient presents with     Depression       Initial /66 (BP Location: Right arm, Patient Position: Sitting, Cuff Size: Adult Regular)   Pulse 88   Temp 98.6  F (37  C) (Tympanic)   Resp 16   Ht 1.6 m (5' 3\")   Wt 71.5 kg (157 lb 11.2 oz)   LMP 11/13/2020   SpO2 99%   BMI 27.94 kg/m   Estimated body mass index is 27.94 kg/m  as calculated from the following:    Height as of this encounter: 1.6 m (5' 3\").    Weight as of this encounter: 71.5 kg (157 lb 11.2 oz).  Medication Reconciliation: complete  Lizbeth Clarke MA  "

## 2021-02-23 NOTE — PATIENT INSTRUCTIONS
Return to office in 4 week(s) for prenatal care and as needed.    If you think your bag of water is broke; have bleeding like a period; think your in labor; or are worried about your baby's movement; please call the labor and delivery unit @ [phone].    Thank you for allowing Jesus Alberto WAITE CNM and our OB team to participate in your care.  If you have a scheduling or an appointment question please contact  [facility] Health Unit Coordinator at their direct line [phone].   ALL nursing questions or concerns can be directed to your OB nurse at: [phone] Ross Stanley/Lennie

## 2021-02-23 NOTE — PROGRESS NOTES
Doing well.  Denies contractions, bleeding, or leakage of fluid.     Discussed:   test, AFP, GTT, increased thirst, fetal movement    US:  Both babies active.    Plan:  Next visit:   AFP   1 hr GTT     Return to office in 4 weeks for prenatal care and as needed.    RAVINDRA Porter, CNM

## 2021-02-23 NOTE — NURSING NOTE
"No chief complaint on file.      Initial /66   Pulse 88   Temp 98.6  F (37  C) (Tympanic)   Resp 16   Ht 1.6 m (5' 3\")   Wt 71.5 kg (157 lb 11.2 oz)   LMP 11/13/2020   SpO2 99%   BMI 27.94 kg/m   Estimated body mass index is 27.94 kg/m  as calculated from the following:    Height as of this encounter: 1.6 m (5' 3\").    Weight as of this encounter: 71.5 kg (157 lb 11.2 oz).  Medication Reconciliation: complete  Lizbeth Clarke MA    "

## 2021-02-24 ASSESSMENT — ANXIETY QUESTIONNAIRES: GAD7 TOTAL SCORE: 0

## 2021-03-23 ENCOUNTER — PRENATAL OFFICE VISIT (OUTPATIENT)
Dept: OBGYN | Facility: OTHER | Age: 36
End: 2021-03-23
Attending: ADVANCED PRACTICE MIDWIFE
Payer: COMMERCIAL

## 2021-03-23 VITALS
DIASTOLIC BLOOD PRESSURE: 74 MMHG | HEIGHT: 63 IN | SYSTOLIC BLOOD PRESSURE: 121 MMHG | BODY MASS INDEX: 28 KG/M2 | WEIGHT: 158 LBS

## 2021-03-23 DIAGNOSIS — O30.042 DICHORIONIC DIAMNIOTIC TWIN PREGNANCY IN SECOND TRIMESTER: ICD-10-CM

## 2021-03-23 DIAGNOSIS — O09.91 SUPERVISION OF HIGH RISK PREGNANCY IN FIRST TRIMESTER: ICD-10-CM

## 2021-03-23 DIAGNOSIS — O09.892 SUPERVISION OF OTHER HIGH RISK PREGNANCIES, SECOND TRIMESTER: Primary | ICD-10-CM

## 2021-03-23 DIAGNOSIS — O09.521 MULTIGRAVIDA OF ADVANCED MATERNAL AGE IN FIRST TRIMESTER: ICD-10-CM

## 2021-03-23 DIAGNOSIS — O30.049 DICHORIONIC DIAMNIOTIC TWIN PREGNANCY, ANTEPARTUM: ICD-10-CM

## 2021-03-23 DIAGNOSIS — O30.041 DICHORIONIC DIAMNIOTIC TWIN PREGNANCY IN FIRST TRIMESTER: ICD-10-CM

## 2021-03-23 PROCEDURE — 82105 ALPHA-FETOPROTEIN SERUM: CPT | Mod: 90 | Performed by: ADVANCED PRACTICE MIDWIFE

## 2021-03-23 PROCEDURE — 82950 GLUCOSE TEST: CPT | Performed by: ADVANCED PRACTICE MIDWIFE

## 2021-03-23 PROCEDURE — 99207 PR PRENATAL VISIT: CPT | Performed by: ADVANCED PRACTICE MIDWIFE

## 2021-03-23 PROCEDURE — 76815 OB US LIMITED FETUS(S): CPT | Performed by: ADVANCED PRACTICE MIDWIFE

## 2021-03-23 PROCEDURE — 36415 COLL VENOUS BLD VENIPUNCTURE: CPT | Performed by: ADVANCED PRACTICE MIDWIFE

## 2021-03-23 ASSESSMENT — PAIN SCALES - GENERAL: PAINLEVEL: NO PAIN (0)

## 2021-03-23 ASSESSMENT — MIFFLIN-ST. JEOR: SCORE: 1375.81

## 2021-03-23 NOTE — PATIENT INSTRUCTIONS
Return to office in 4 week(s) for prenatal care and as needed.    If you think your bag of water is broke; have bleeding like a period; think your in labor; or are worried about your baby's movement; please call the labor and delivery unit @ [phone].    Thank you for allowing Jesus Alberto WAITE CNM and our OB team to participate in your care.  If you have a scheduling or an appointment question please contact  [facility] Health Unit Coordinator at their direct line [phone].   ALL nursing questions or concerns can be directed to your OB nurse at: [phone] Ross Stanley/Lennie

## 2021-03-23 NOTE — PROGRESS NOTES
Doing well.  Some movement  Denies contractions, bleeding, or leakage of fluid.     Discussed:  Early 1 hr GTT, travel, fetal movement    US:  Cardiac activity and movement noted in both babies    Plan:  US on 4/13/21    Return to office in 4 weeks for prenatal care and as needed.    RAVINDRA Porter, CNM

## 2021-03-23 NOTE — NURSING NOTE
"Chief Complaint   Patient presents with     Prenatal Care     18w4d       Initial /74 (BP Location: Left arm, Patient Position: Chair, Cuff Size: Adult Regular)   Ht 1.6 m (5' 3\")   Wt 71.7 kg (158 lb)   LMP 11/13/2020   BMI 27.99 kg/m   Estimated body mass index is 27.99 kg/m  as calculated from the following:    Height as of this encounter: 1.6 m (5' 3\").    Weight as of this encounter: 71.7 kg (158 lb).  Medication Reconciliation: complete  Lizeth Sinha LPN    "

## 2021-03-24 LAB — GLUCOSE 1H P 50 G GLC PO SERPL-MCNC: 125 MG/DL (ref 60–129)

## 2021-03-30 LAB
# FETUSES US: NORMAL
# FETUSES: NORMAL
AFP ADJ MOM AMN: 1.88
AFP SERPL-MCNC: 85 NG/ML
AGE - REPORTED: 37 YR
CURRENT SMOKER: NO
CURRENT SMOKER: NO
DIABETES STATUS PATIENT: NO
EGG DONOR AGE: NO
FAMILY MEMBER DISEASES HX: NO
FAMILY MEMBER DISEASES HX: NO
GA METHOD: NORMAL
GA METHOD: NORMAL
GA: NORMAL WK
IDDM PATIENT QL: NO
INTEGRATED SCN PATIENT-IMP: NORMAL
IVF PREGNANCY: NO
LMP START DATE: NORMAL
MONOCHORIONIC TWINS: NO
SERVICE CMNT-IMP: NO
SPECIMEN DRAWN SERPL: NORMAL
VALPROIC/CARBAMAZEPINE STATUS: NO
WEIGHT UNITS: NORMAL

## 2021-04-13 ENCOUNTER — HOSPITAL ENCOUNTER (OUTPATIENT)
Dept: ULTRASOUND IMAGING | Facility: CLINIC | Age: 36
End: 2021-04-13
Attending: ADVANCED PRACTICE MIDWIFE
Payer: COMMERCIAL

## 2021-04-13 ENCOUNTER — OFFICE VISIT (OUTPATIENT)
Dept: MATERNAL FETAL MEDICINE | Facility: CLINIC | Age: 36
End: 2021-04-13
Attending: ADVANCED PRACTICE MIDWIFE
Payer: COMMERCIAL

## 2021-04-13 ENCOUNTER — HOSPITAL ENCOUNTER (OUTPATIENT)
Dept: ULTRASOUND IMAGING | Facility: HOSPITAL | Age: 36
Discharge: HOME | End: 2021-04-13
Attending: ADVANCED PRACTICE MIDWIFE | Admitting: ADVANCED PRACTICE MIDWIFE
Payer: COMMERCIAL

## 2021-04-13 DIAGNOSIS — O30.049 DICHORIONIC DIAMNIOTIC TWIN PREGNANCY, ANTEPARTUM: Primary | ICD-10-CM

## 2021-04-13 DIAGNOSIS — O30.049 DICHORIONIC DIAMNIOTIC TWIN PREGNANCY, ANTEPARTUM: ICD-10-CM

## 2021-04-13 NOTE — PROGRESS NOTES
"Type of service:    Telemedicine Office Visit for di di pregancy    Date of service:     Date: 04/13/21     Time service began:    10:30 AM  Time service ended:    11:00 AM    Reason:      .tel: Lack of available service in patient area    Description of basis or telemedicine appropriateness:     Consultation provided at the request of Jesus Alberto Klein for advice regarding the diagnosis and treatment of this patient's di di twin gestation.  The patient's condition can be safely assessed via telemedicine.    The Mode of Transmission:    Secure interactive audio and visual telecommunication system (Video Guidance)    Location of originating and distant sites:      Originating site:   [place], [place]    Distant site:    [place], [place]    Please see \"Imaging\" tab under \"Chart Review\" for details of today's visit.    Daria Rodriguez MD PhD  Maternal Fetal Medicine         "

## 2021-04-14 ENCOUNTER — TRANSFERRED RECORDS (OUTPATIENT)
Dept: HEALTH INFORMATION MANAGEMENT | Facility: CLINIC | Age: 36
End: 2021-04-14

## 2021-04-14 ENCOUNTER — MEDICAL CORRESPONDENCE (OUTPATIENT)
Dept: HEALTH INFORMATION MANAGEMENT | Facility: CLINIC | Age: 36
End: 2021-04-14

## 2021-04-14 DIAGNOSIS — O30.042 DICHORIONIC DIAMNIOTIC TWIN PREGNANCY IN SECOND TRIMESTER: Primary | ICD-10-CM

## 2021-04-15 ENCOUNTER — TRANSCRIBE ORDERS (OUTPATIENT)
Dept: MATERNAL FETAL MEDICINE | Facility: CLINIC | Age: 36
End: 2021-04-15

## 2021-04-15 DIAGNOSIS — O26.90 PREGNANCY RELATED CONDITION, ANTEPARTUM: Primary | ICD-10-CM

## 2021-04-16 ENCOUNTER — TRANSCRIBE ORDERS (OUTPATIENT)
Dept: MATERNAL FETAL MEDICINE | Facility: CLINIC | Age: 36
End: 2021-04-16

## 2021-04-16 DIAGNOSIS — O30.042 DICHORIONIC DIAMNIOTIC TWIN PREGNANCY IN SECOND TRIMESTER: Primary | ICD-10-CM

## 2021-04-16 DIAGNOSIS — O09.892 SUPERVISION OF OTHER HIGH RISK PREGNANCIES, SECOND TRIMESTER: ICD-10-CM

## 2021-04-16 DIAGNOSIS — O26.90 PREGNANCY RELATED CONDITION, ANTEPARTUM: Primary | ICD-10-CM

## 2021-04-20 ENCOUNTER — PRENATAL OFFICE VISIT (OUTPATIENT)
Dept: OBGYN | Facility: OTHER | Age: 36
End: 2021-04-20
Attending: ADVANCED PRACTICE MIDWIFE
Payer: COMMERCIAL

## 2021-04-20 VITALS
BODY MASS INDEX: 28.35 KG/M2 | WEIGHT: 160 LBS | HEIGHT: 63 IN | SYSTOLIC BLOOD PRESSURE: 110 MMHG | DIASTOLIC BLOOD PRESSURE: 64 MMHG

## 2021-04-20 DIAGNOSIS — O30.042 DICHORIONIC DIAMNIOTIC TWIN PREGNANCY IN SECOND TRIMESTER: Primary | ICD-10-CM

## 2021-04-20 PROCEDURE — 76815 OB US LIMITED FETUS(S): CPT | Performed by: ADVANCED PRACTICE MIDWIFE

## 2021-04-20 PROCEDURE — 99207 PR PRENATAL VISIT: CPT | Performed by: ADVANCED PRACTICE MIDWIFE

## 2021-04-20 ASSESSMENT — MIFFLIN-ST. JEOR: SCORE: 1384.89

## 2021-04-20 ASSESSMENT — PAIN SCALES - GENERAL: PAINLEVEL: NO PAIN (0)

## 2021-04-20 NOTE — PATIENT INSTRUCTIONS
Return to office in 4 week(s) for prenatal care and as needed.    If you think your bag of water is broke; have bleeding like a period; think your in labor; or are worried about your baby's movement; please call the labor and delivery unit @ [phone].    Thank you for allowing Jesus Alberto WAITE CNM and our OB team to participate in your care.  If you have a scheduling or an appointment question please contact  [facility] Health Unit Coordinator at their direct line [phone].   ALL nursing questions or concerns can be directed to your OB nurse at: [phone] Ross Stanley/Lennie

## 2021-04-20 NOTE — PROGRESS NOTES
Doing well.  Babies active.   Denies contractions, bleeding, or leakage of fluid. Some possible mild BH when standing x one    Discussed:  Reviewed US in depth, VSD, FGR, fetal movement    Plan:  M US and ECHO on 5/6/21    Return to office in 4 weeks for prenatal care and as needed.    RAVINDRA Porter, CNM

## 2021-04-20 NOTE — NURSING NOTE
"Chief Complaint   Patient presents with     Prenatal Care     22w4d       Initial /64 (BP Location: Left arm, Patient Position: Chair, Cuff Size: Adult Regular)   Ht 1.6 m (5' 3\")   Wt 72.6 kg (160 lb)   LMP 11/13/2020   BMI 28.34 kg/m   Estimated body mass index is 28.34 kg/m  as calculated from the following:    Height as of this encounter: 1.6 m (5' 3\").    Weight as of this encounter: 72.6 kg (160 lb).  Medication Reconciliation: complete  Lizeth Sinha LPN      "

## 2021-05-06 ENCOUNTER — HOSPITAL ENCOUNTER (OUTPATIENT)
Dept: CARDIOLOGY | Facility: CLINIC | Age: 36
End: 2021-05-06
Attending: ADVANCED PRACTICE MIDWIFE
Payer: COMMERCIAL

## 2021-05-06 ENCOUNTER — OFFICE VISIT (OUTPATIENT)
Dept: MATERNAL FETAL MEDICINE | Facility: CLINIC | Age: 36
End: 2021-05-06
Attending: ADVANCED PRACTICE MIDWIFE
Payer: COMMERCIAL

## 2021-05-06 ENCOUNTER — HOSPITAL ENCOUNTER (OUTPATIENT)
Dept: ULTRASOUND IMAGING | Facility: CLINIC | Age: 36
End: 2021-05-06
Attending: ADVANCED PRACTICE MIDWIFE
Payer: COMMERCIAL

## 2021-05-06 DIAGNOSIS — O26.90 PREGNANCY RELATED CONDITION, ANTEPARTUM: ICD-10-CM

## 2021-05-06 DIAGNOSIS — O30.042 DICHORIONIC DIAMNIOTIC TWIN PREGNANCY IN SECOND TRIMESTER: Primary | ICD-10-CM

## 2021-05-06 PROCEDURE — 76827 ECHO EXAM OF FETAL HEART: CPT | Mod: 26 | Performed by: PEDIATRICS

## 2021-05-06 PROCEDURE — 76827 ECHO EXAM OF FETAL HEART: CPT

## 2021-05-06 PROCEDURE — 76816 OB US FOLLOW-UP PER FETUS: CPT | Mod: 59

## 2021-05-06 PROCEDURE — 76825 ECHO EXAM OF FETAL HEART: CPT | Mod: 26 | Performed by: PEDIATRICS

## 2021-05-06 PROCEDURE — 93325 DOPPLER ECHO COLOR FLOW MAPG: CPT

## 2021-05-06 PROCEDURE — 93325 DOPPLER ECHO COLOR FLOW MAPG: CPT | Mod: 26 | Performed by: PEDIATRICS

## 2021-05-06 PROCEDURE — 76816 OB US FOLLOW-UP PER FETUS: CPT | Mod: 26 | Performed by: OBSTETRICS & GYNECOLOGY

## 2021-05-06 PROCEDURE — 76820 UMBILICAL ARTERY ECHO: CPT | Mod: 59

## 2021-05-06 PROCEDURE — 76820 UMBILICAL ARTERY ECHO: CPT | Mod: 26 | Performed by: OBSTETRICS & GYNECOLOGY

## 2021-05-12 DIAGNOSIS — O30.049 DICHORIONIC DIAMNIOTIC TWIN PREGNANCY, ANTEPARTUM: ICD-10-CM

## 2021-05-12 DIAGNOSIS — O30.042 DICHORIONIC DIAMNIOTIC TWIN PREGNANCY IN SECOND TRIMESTER: Primary | ICD-10-CM

## 2021-05-18 ENCOUNTER — PRENATAL OFFICE VISIT (OUTPATIENT)
Dept: OBGYN | Facility: OTHER | Age: 36
End: 2021-05-18
Attending: ADVANCED PRACTICE MIDWIFE
Payer: COMMERCIAL

## 2021-05-18 VITALS
WEIGHT: 171 LBS | SYSTOLIC BLOOD PRESSURE: 108 MMHG | TEMPERATURE: 98.5 F | HEART RATE: 105 BPM | OXYGEN SATURATION: 98 % | DIASTOLIC BLOOD PRESSURE: 66 MMHG | RESPIRATION RATE: 18 BRPM | BODY MASS INDEX: 30.29 KG/M2

## 2021-05-18 DIAGNOSIS — O30.042 DICHORIONIC DIAMNIOTIC TWIN PREGNANCY IN SECOND TRIMESTER: Primary | ICD-10-CM

## 2021-05-18 DIAGNOSIS — O30.049 DICHORIONIC DIAMNIOTIC TWIN PREGNANCY, ANTEPARTUM: ICD-10-CM

## 2021-05-18 PROCEDURE — 99207 PR PRENATAL VISIT: CPT | Performed by: ADVANCED PRACTICE MIDWIFE

## 2021-05-18 NOTE — PROGRESS NOTES
Doing well.  Babies active.   Denies contractions, bleeding, or leakage of fluid.  Increased discharge.    Discussed:  Discussed LOF and going to hospital for testing, 3rd tri labs, 1 hr GTT, Tdap, fetal movement.  Plan:  Next visit:   3rd tri labs   1 hr GTT   Tdap  Next US on 5/25/21  Schedule appt with Delfin in 3rd tri    Return to office in 2 weeks for prenatal care and as needed.    RAVINDRA Porter, CNM

## 2021-05-18 NOTE — PATIENT INSTRUCTIONS
Return to office in 1-2 week(s) for prenatal care and as needed.    If you think your bag of water is broke; have bleeding like a period; think your in labor; or are worried about your baby's movement; please call the labor and delivery unit @ [phone].    Thank you for allowing Jesus Alberto WAITE CNM and our OB team to participate in your care.  If you have a scheduling or an appointment question please contact [facility] Health Unit Coordinator at their direct line [phone].   ALL nursing questions or concerns can be directed to your OB nurse at: [phone] Ross Stanley/Lennie

## 2021-05-18 NOTE — NURSING NOTE
"Chief Complaint   Patient presents with     Prenatal Care     26w4d       Initial /66 (BP Location: Left arm, Patient Position: Sitting, Cuff Size: Adult Regular)   Pulse 105   Temp 98.5  F (36.9  C) (Tympanic)   Resp 18   Wt 77.6 kg (171 lb)   LMP 11/13/2020   SpO2 98%   BMI 30.29 kg/m   Estimated body mass index is 30.29 kg/m  as calculated from the following:    Height as of 4/20/21: 1.6 m (5' 3\").    Weight as of this encounter: 77.6 kg (171 lb).  Medication Reconciliation: complete  Lizeth Sinha LPN      "

## 2021-05-25 ENCOUNTER — HOSPITAL ENCOUNTER (OUTPATIENT)
Dept: ULTRASOUND IMAGING | Facility: HOSPITAL | Age: 36
Discharge: HOME | End: 2021-05-25
Attending: ADVANCED PRACTICE MIDWIFE | Admitting: ADVANCED PRACTICE MIDWIFE
Payer: COMMERCIAL

## 2021-05-25 ENCOUNTER — OFFICE VISIT (OUTPATIENT)
Dept: MATERNAL FETAL MEDICINE | Facility: CLINIC | Age: 36
End: 2021-05-25
Attending: ADVANCED PRACTICE MIDWIFE
Payer: COMMERCIAL

## 2021-05-25 ENCOUNTER — HOSPITAL ENCOUNTER (OUTPATIENT)
Dept: ULTRASOUND IMAGING | Facility: CLINIC | Age: 36
End: 2021-05-25
Attending: ADVANCED PRACTICE MIDWIFE
Payer: COMMERCIAL

## 2021-05-25 DIAGNOSIS — O30.049 DICHORIONIC DIAMNIOTIC TWIN PREGNANCY, ANTEPARTUM: ICD-10-CM

## 2021-05-25 DIAGNOSIS — O30.042 DICHORIONIC DIAMNIOTIC TWIN PREGNANCY IN SECOND TRIMESTER: Primary | ICD-10-CM

## 2021-05-25 DIAGNOSIS — O09.529 AMA (ADVANCED MATERNAL AGE) MULTIGRAVIDA 35+, UNSPECIFIED TRIMESTER: ICD-10-CM

## 2021-05-25 DIAGNOSIS — O36.5921 INTRAUTERINE GROWTH RESTRICTION AFFECTING ANTEPARTUM CARE OF MOTHER IN SECOND TRIMESTER, FETUS 1: ICD-10-CM

## 2021-05-25 DIAGNOSIS — O30.043 DICHORIONIC DIAMNIOTIC TWIN PREGNANCY IN THIRD TRIMESTER: Primary | ICD-10-CM

## 2021-05-25 NOTE — PROGRESS NOTES
Type of service:     Twin growth ultrasound    Date of service:     May 25, 2021    Time service began:    12:07 PM  Time service ended:    12:57 PM    Reason:      Patient conveience    Description of basis or telemedicine appropriateness:     Consultation provided at the request of Ms. Klein for advice regarding the diagnosis and treatment of this patient's prengnacy.  The patient's condition can be safely assessed via telemedicine.    The Mode of Transmission:    Secure interactive audio and visual telecommunication system (Video Guidance)    Location of originating and distant sites:      Originating site:   [place], [place]    Distant site:   [place], [place]        Daria Bailey MD  , OB/GYN  Maternal-Fetal Medicine  [email or web address]  [phone] (Main MFM Office)  [phone] or [phone] (for 24 hour MFM questions)  [phone] (Pager)

## 2021-05-31 ENCOUNTER — HOSPITAL ENCOUNTER (OUTPATIENT)
Facility: HOSPITAL | Age: 36
End: 2021-05-31
Admitting: ADVANCED PRACTICE MIDWIFE
Payer: COMMERCIAL

## 2021-05-31 ENCOUNTER — HOSPITAL ENCOUNTER (OUTPATIENT)
Facility: HOSPITAL | Age: 36
Discharge: HOME | End: 2021-05-31
Attending: ADVANCED PRACTICE MIDWIFE | Admitting: ADVANCED PRACTICE MIDWIFE
Payer: COMMERCIAL

## 2021-05-31 VITALS
OXYGEN SATURATION: 95 % | SYSTOLIC BLOOD PRESSURE: 112 MMHG | HEART RATE: 99 BPM | RESPIRATION RATE: 17 BRPM | DIASTOLIC BLOOD PRESSURE: 69 MMHG | TEMPERATURE: 98.8 F

## 2021-05-31 DIAGNOSIS — O30.043 DICHORIONIC DIAMNIOTIC TWIN PREGNANCY IN THIRD TRIMESTER: ICD-10-CM

## 2021-05-31 PROCEDURE — G0463 HOSPITAL OUTPT CLINIC VISIT: HCPCS | Mod: 25

## 2021-05-31 PROCEDURE — 59025 FETAL NON-STRESS TEST: CPT | Mod: 26 | Performed by: ADVANCED PRACTICE MIDWIFE

## 2021-05-31 PROCEDURE — 59025 FETAL NON-STRESS TEST: CPT

## 2021-05-31 PROCEDURE — 59025 FETAL NON-STRESS TEST: CPT | Mod: 59

## 2021-05-31 NOTE — PLAN OF CARE
Rachel Carvajal        NST:  reactive  Start:1623   Stop:1800     Physician: Jesus Alberto Klein  Reason For Test: Twins  EDC:08/20/2021  Gestational Age: 28 3/7    Comments:  Babies very active.       Daria Mendiola RN

## 2021-05-31 NOTE — DISCHARGE INSTRUCTIONS

## 2021-06-02 ENCOUNTER — HOSPITAL ENCOUNTER (OUTPATIENT)
Dept: ULTRASOUND IMAGING | Facility: HOSPITAL | Age: 36
End: 2021-06-02
Attending: ADVANCED PRACTICE MIDWIFE
Payer: COMMERCIAL

## 2021-06-02 DIAGNOSIS — O30.049 DICHORIONIC DIAMNIOTIC TWIN PREGNANCY, ANTEPARTUM: ICD-10-CM

## 2021-06-02 PROCEDURE — 76815 OB US LIMITED FETUS(S): CPT

## 2021-06-02 PROCEDURE — 76815 OB US LIMITED FETUS(S): CPT | Mod: XS

## 2021-06-02 PROCEDURE — 76820 UMBILICAL ARTERY ECHO: CPT | Mod: 59

## 2021-06-03 ENCOUNTER — PRENATAL OFFICE VISIT (OUTPATIENT)
Dept: OBGYN | Facility: OTHER | Age: 36
End: 2021-06-03
Attending: ADVANCED PRACTICE MIDWIFE
Payer: COMMERCIAL

## 2021-06-03 VITALS
SYSTOLIC BLOOD PRESSURE: 108 MMHG | WEIGHT: 174 LBS | BODY MASS INDEX: 30.83 KG/M2 | HEIGHT: 63 IN | DIASTOLIC BLOOD PRESSURE: 65 MMHG

## 2021-06-03 DIAGNOSIS — R82.90 NONSPECIFIC FINDING ON EXAMINATION OF URINE: Primary | ICD-10-CM

## 2021-06-03 DIAGNOSIS — O30.049 DICHORIONIC DIAMNIOTIC TWIN PREGNANCY, ANTEPARTUM: ICD-10-CM

## 2021-06-03 DIAGNOSIS — O99.810 GLUCOSE INTOLERANCE OF PREGNANCY: ICD-10-CM

## 2021-06-03 LAB
ALBUMIN UR-MCNC: NEGATIVE MG/DL
APPEARANCE UR: CLEAR
BACTERIA #/AREA URNS HPF: ABNORMAL /HPF
BILIRUB UR QL STRIP: NEGATIVE
COLOR UR AUTO: YELLOW
ERYTHROCYTE [DISTWIDTH] IN BLOOD BY AUTOMATED COUNT: 12.9 % (ref 10–15)
GLUCOSE 1H P 50 G GLC PO SERPL-MCNC: 131 MG/DL (ref 60–129)
GLUCOSE UR STRIP-MCNC: NEGATIVE MG/DL
HCT VFR BLD AUTO: 31.5 % (ref 35–47)
HGB BLD-MCNC: 11 G/DL (ref 11.7–15.7)
HGB UR QL STRIP: NEGATIVE
KETONES UR STRIP-MCNC: NEGATIVE MG/DL
LEUKOCYTE ESTERASE UR QL STRIP: ABNORMAL
MCH RBC QN AUTO: 31 PG (ref 26.5–33)
MCHC RBC AUTO-ENTMCNC: 34.9 G/DL (ref 31.5–36.5)
MCV RBC AUTO: 89 FL (ref 78–100)
NITRATE UR QL: NEGATIVE
NON-SQ EPI CELLS #/AREA URNS LPF: ABNORMAL /LPF
PH UR STRIP: 5.5 PH (ref 5–7)
PLATELET # BLD AUTO: 284 10E9/L (ref 150–450)
RBC # BLD AUTO: 3.55 10E12/L (ref 3.8–5.2)
RBC #/AREA URNS AUTO: ABNORMAL /HPF
SOURCE: ABNORMAL
SP GR UR STRIP: 1.01 (ref 1–1.03)
UROBILINOGEN UR STRIP-ACNC: 0.2 EU/DL (ref 0.2–1)
WBC # BLD AUTO: 11.2 10E9/L (ref 4–11)
WBC #/AREA URNS AUTO: ABNORMAL /HPF

## 2021-06-03 PROCEDURE — 86780 TREPONEMA PALLIDUM: CPT | Performed by: ADVANCED PRACTICE MIDWIFE

## 2021-06-03 PROCEDURE — 99207 PR PRENATAL VISIT: CPT | Performed by: ADVANCED PRACTICE MIDWIFE

## 2021-06-03 PROCEDURE — 82950 GLUCOSE TEST: CPT | Performed by: ADVANCED PRACTICE MIDWIFE

## 2021-06-03 PROCEDURE — 85027 COMPLETE CBC AUTOMATED: CPT | Performed by: ADVANCED PRACTICE MIDWIFE

## 2021-06-03 PROCEDURE — 90715 TDAP VACCINE 7 YRS/> IM: CPT | Performed by: ADVANCED PRACTICE MIDWIFE

## 2021-06-03 PROCEDURE — 81001 URINALYSIS AUTO W/SCOPE: CPT | Performed by: ADVANCED PRACTICE MIDWIFE

## 2021-06-03 PROCEDURE — 36415 COLL VENOUS BLD VENIPUNCTURE: CPT | Performed by: ADVANCED PRACTICE MIDWIFE

## 2021-06-03 PROCEDURE — 90471 IMMUNIZATION ADMIN: CPT | Performed by: ADVANCED PRACTICE MIDWIFE

## 2021-06-03 PROCEDURE — 87086 URINE CULTURE/COLONY COUNT: CPT | Performed by: ADVANCED PRACTICE MIDWIFE

## 2021-06-03 ASSESSMENT — MIFFLIN-ST. JEOR: SCORE: 1448.39

## 2021-06-03 ASSESSMENT — PAIN SCALES - GENERAL: PAINLEVEL: NO PAIN (0)

## 2021-06-03 NOTE — NURSING NOTE
"Chief Complaint   Patient presents with     Prenatal Care     28w6d       Initial /65 (BP Location: Left arm, Patient Position: Chair, Cuff Size: Adult Regular)   Ht 1.6 m (5' 3\")   Wt 78.9 kg (174 lb)   LMP 11/13/2020   BMI 30.82 kg/m   Estimated body mass index is 30.82 kg/m  as calculated from the following:    Height as of this encounter: 1.6 m (5' 3\").    Weight as of this encounter: 78.9 kg (174 lb).  Medication Reconciliation: complete  Lizeth Sinha LPN    "

## 2021-06-03 NOTE — PROGRESS NOTES
Doing well.  Babies active.   Denies contractions, bleeding, or leakage of fluid.     Discussed:  Reviewed recent US, OB consult,  testing, fetal movement    Plan:  3rd tri labs  1 hr GTT  Tdap  Schedule appt with Dr. Lenz for consult    Return to office in 2 weeks for prenatal care and as needed.    RAVINDRA Porter, CNM

## 2021-06-03 NOTE — PATIENT INSTRUCTIONS
Return to office in 2 week(s) for prenatal care and as needed.    If you think your bag of water is broke; have bleeding like a period; think your in labor; or are worried about your baby's movement; please call the labor and delivery unit @ [phone].    Thank you for allowing Jesus Alberto WAITE CNM and our OB team to participate in your care.  If you have a scheduling or an appointment question please contact  [facility] Health Unit Coordinator at their direct line [phone].   ALL nursing questions or concerns can be directed to your OB nurse at: [phone] Ross Stanley/Lennie

## 2021-06-05 LAB
BACTERIA SPEC CULT: NO GROWTH
SPECIMEN SOURCE: NORMAL
T PALLIDUM AB SER QL: NONREACTIVE

## 2021-06-06 ENCOUNTER — HOSPITAL ENCOUNTER (EMERGENCY)
Facility: HOSPITAL | Age: 36
End: 2021-06-06
Payer: COMMERCIAL

## 2021-06-06 ENCOUNTER — HOSPITAL ENCOUNTER (OUTPATIENT)
Facility: HOSPITAL | Age: 36
Discharge: HOME | End: 2021-06-06
Attending: ADVANCED PRACTICE MIDWIFE | Admitting: ADVANCED PRACTICE MIDWIFE
Payer: COMMERCIAL

## 2021-06-06 VITALS
RESPIRATION RATE: 16 BRPM | SYSTOLIC BLOOD PRESSURE: 124 MMHG | DIASTOLIC BLOOD PRESSURE: 83 MMHG | HEART RATE: 85 BPM | WEIGHT: 176 LBS | HEIGHT: 64 IN | BODY MASS INDEX: 30.05 KG/M2 | OXYGEN SATURATION: 96 % | TEMPERATURE: 97.9 F

## 2021-06-06 PROCEDURE — 59025 FETAL NON-STRESS TEST: CPT | Mod: 26 | Performed by: OBSTETRICS & GYNECOLOGY

## 2021-06-06 PROCEDURE — 59025 FETAL NON-STRESS TEST: CPT

## 2021-06-06 ASSESSMENT — MIFFLIN-ST. JEOR: SCORE: 1473.33

## 2021-06-06 NOTE — PLAN OF CARE
Rachel Carvajal    Twin 1  NST: Reactive  Start: 1455  Stop: 1515    Twin 2  NST:  Reactive  Start: 1455  Stop: 1515    Physician: Dr. Lenz  Reason For Test: Twins  EDC: 08/20/2021  Gestational Age: 29w2d    Comments: Pt denied pain. No contractions. Denied LOF and/or vaginal bleeding.      Sean Gonzalez, RN

## 2021-06-06 NOTE — DISCHARGE INSTRUCTIONS
Discharge Instructions for Undelivered Patients    Diet:  * Drink 8 to 12 glasses of liquids (milk, juice, water) every day  * You may eat meals and snacks.    Activity:  * Count fetal kicks every day.  * Call your doctor if your baby is moving less than usual.    Call your provider if you notice:  * Swelling in your face or increased swelling in your hands or legs.  * Headaches that are not relieved by Tylenol (acetaminophen).  * Changes in your vision (blurring; seeing spots or stars).  * Nausea (sick to your stomach) and vomiting (throwing up).  * Weight gain of 5 pounds per week.  * Heartburn that doesn't go away.  * Signs of bladder infection: Pain when you urinate (use the toilet), needing to go more often or more urgently.  * The bag of nunez (membrane) breaks, or you notice leaking in your underwear.  * Bright red blood in your underwear.  * Abdominal (lower belly) or stomach pain.  * Increase or change in vaginal discharge (note the color and amount).  * If kemar prior to 36 weeks come in to be evaluated.       [facility]: [phone]

## 2021-06-06 NOTE — PLAN OF CARE
OB Triage Note  Rachel Carvajal  MRN: [id number]  Gestational Age: 29w2d      Rachel Carvajal presents for NST, states unable to find  for tomorrow.  (sign/symptom/concern).        Dr. Lenz notified of arrival and condition.  Oriented patient to surroundings. Call light within reach.       Plan:  -Initial NST, then fetal/uterine monitoring per MD/patient plan.  -Nursing education provided.

## 2021-06-06 NOTE — PLAN OF CARE
"Rachel Carvajal is a 35 year old  and 29w2d patient came in complaining of Non Stress Test    Patient Active Problem List   Diagnosis     Mild episode of recurrent major depressive disorder (H)     Advance care planning     Herpes simplex infection of genitourinary system     Acute appendicitis with localized peritonitis     Dichorionic diamniotic twin pregnancy     Glucose intolerance of pregnancy       Pt discharged to home at 3:20 PM and encouraged to rest and drink plenty of fluids.  Pt told to call/return if bleeding more than spotting, water breaks, or kemar.    Nursing education provided. Self-management instructions reviewed.  AVS given and signed. All questions answered and patient verbalizes understanding.    /83 (BP Location: Right arm)   Pulse 85   Temp 97.9  F (36.6  C) (Oral)   Resp 16   Ht 1.626 m (5' 4\")   Wt 79.8 kg (176 lb)   LMP 2020   SpO2 96%   BMI 30.21 kg/m      Cervical status: not examined  Fetal Assessment: Reactive    Discharge support:  Independent-Alone    OB History    Para Term  AB Living   5 2 2 0 0 2   SAB TAB Ectopic Multiple Live Births   0 0 0 0 2      # Outcome Date GA Lbr Sharif/2nd Weight Sex Delivery Anes PTL Lv   5 Current            4 Term 17 39w0d / 01:47 3.295 kg (7 lb 4.2 oz) F Vag-Spont EPI  LILIANE      Name: STARLA FORTE      Apgar1: 9  Apgar5: 9   3 Term 12 40w0d  3.204 kg (7 lb 1 oz) F Vag-Spont EPI N LIVE BIRTH      Name: Matt   2             1                 Paty Anna RN    "

## 2021-06-10 ENCOUNTER — HOSPITAL ENCOUNTER (OUTPATIENT)
Dept: ULTRASOUND IMAGING | Facility: HOSPITAL | Age: 36
End: 2021-06-10
Attending: ADVANCED PRACTICE MIDWIFE
Payer: COMMERCIAL

## 2021-06-10 DIAGNOSIS — O99.810 GLUCOSE INTOLERANCE OF PREGNANCY: ICD-10-CM

## 2021-06-10 DIAGNOSIS — O30.049 DICHORIONIC DIAMNIOTIC TWIN PREGNANCY, ANTEPARTUM: ICD-10-CM

## 2021-06-10 LAB
EST. AVERAGE GLUCOSE BLD GHB EST-MCNC: 103 MG/DL
GLUCOSE 1H P 100 G GLC PO SERPL-MCNC: 188 MG/DL (ref 60–179)
GLUCOSE 2H P 100 G GLC PO SERPL-MCNC: 137 MG/DL (ref 60–154)
GLUCOSE 3H P 100 G GLC PO SERPL-MCNC: 135 MG/DL (ref 60–139)
GLUCOSE P FAST SERPL-MCNC: 86 MG/DL (ref 60–94)
HBA1C MFR BLD: 5.2 % (ref 0–5.6)

## 2021-06-10 PROCEDURE — 36415 COLL VENOUS BLD VENIPUNCTURE: CPT | Performed by: ADVANCED PRACTICE MIDWIFE

## 2021-06-10 PROCEDURE — 83036 HEMOGLOBIN GLYCOSYLATED A1C: CPT | Performed by: ADVANCED PRACTICE MIDWIFE

## 2021-06-10 PROCEDURE — 82951 GLUCOSE TOLERANCE TEST (GTT): CPT | Performed by: ADVANCED PRACTICE MIDWIFE

## 2021-06-10 PROCEDURE — 82952 GTT-ADDED SAMPLES: CPT | Performed by: ADVANCED PRACTICE MIDWIFE

## 2021-06-10 PROCEDURE — 76815 OB US LIMITED FETUS(S): CPT

## 2021-06-10 PROCEDURE — 76820 UMBILICAL ARTERY ECHO: CPT

## 2021-06-10 PROCEDURE — 99N1182 PR STATISTIC ESTIMATED AVERAGE GLUCOSE: Performed by: ADVANCED PRACTICE MIDWIFE

## 2021-06-11 DIAGNOSIS — F33.0 MILD EPISODE OF RECURRENT MAJOR DEPRESSIVE DISORDER (H): ICD-10-CM

## 2021-06-11 RX ORDER — SERTRALINE HYDROCHLORIDE 100 MG/1
TABLET, FILM COATED ORAL
Qty: 30 TABLET | Refills: 2 | Status: SHIPPED | OUTPATIENT
Start: 2021-06-11 | End: 2021-09-17

## 2021-06-11 NOTE — TELEPHONE ENCOUNTER
zoloft      Last Written Prescription Date:  1/29/21  Last Fill Quantity: 30,   # refills: 2  Last Office Visit: 2/23/21  Future Office visit:    Next 5 appointments (look out 90 days)    Jun 17, 2021  2:30 PM  (Arrive by 2:15 PM)  [facility] [address] [id number]  [phone]   Jul 01, 2021  2:30 PM  (Arrive by 2:15 PM)  ESTABLISHED PRENATAL with RAVINDRA Mccord CNM  [facility] [place] ([facility] [address] [address]  [phone]   Jul 15, 2021  1:15 PM  (Arrive by 1:00 PM)  ESTABLISHED PRENATAL with RAVINDRA Mccord CNM  [facility] [place] ([facility] [place] [facility] [address] [address]  [phone]   Jul 22, 2021  2:30 PM  (Arrive by 2:15 PM)  ESTABLISHED PRENATAL with RAVINDRA Mccord CNM  [facility] [place] ([facility] [address] [address]  [phone]

## 2021-06-14 ENCOUNTER — HOSPITAL ENCOUNTER (OUTPATIENT)
Facility: HOSPITAL | Age: 36
Discharge: HOME | End: 2021-06-14
Attending: ADVANCED PRACTICE MIDWIFE | Admitting: ADVANCED PRACTICE MIDWIFE
Payer: COMMERCIAL

## 2021-06-14 VITALS
RESPIRATION RATE: 16 BRPM | DIASTOLIC BLOOD PRESSURE: 77 MMHG | HEART RATE: 96 BPM | OXYGEN SATURATION: 96 % | SYSTOLIC BLOOD PRESSURE: 114 MMHG | TEMPERATURE: 98.1 F

## 2021-06-14 RX ORDER — PYRIDOXINE HCL (VITAMIN B6) 50 MG
50 TABLET ORAL DAILY
COMMUNITY
End: 2021-07-28

## 2021-06-14 NOTE — DISCHARGE INSTRUCTIONS

## 2021-06-15 NOTE — PLAN OF CARE
Rachel Carvajal    Twin 1  NST:  reactive  Start: 1835  Stop: 1855    Twin 2  NST: reactive  Start: 1745  Stop: 1815    Physician: Jesus Alberto Klein CNM  Reason For Test: Twins  EDC: 08/20/2021  Gestational Age: 30w3d      Sean Gonzalez RN

## 2021-06-17 ENCOUNTER — PRENATAL OFFICE VISIT (OUTPATIENT)
Dept: OBGYN | Facility: OTHER | Age: 36
End: 2021-06-17
Attending: ADVANCED PRACTICE MIDWIFE
Payer: COMMERCIAL

## 2021-06-17 VITALS
DIASTOLIC BLOOD PRESSURE: 68 MMHG | SYSTOLIC BLOOD PRESSURE: 109 MMHG | WEIGHT: 178 LBS | BODY MASS INDEX: 30.39 KG/M2 | HEIGHT: 64 IN

## 2021-06-17 DIAGNOSIS — O30.043 DICHORIONIC DIAMNIOTIC TWIN PREGNANCY IN THIRD TRIMESTER: ICD-10-CM

## 2021-06-17 PROCEDURE — 99207 PR PRENATAL VISIT: CPT | Performed by: ADVANCED PRACTICE MIDWIFE

## 2021-06-17 ASSESSMENT — MIFFLIN-ST. JEOR: SCORE: 1482.4

## 2021-06-17 ASSESSMENT — PAIN SCALES - GENERAL: PAINLEVEL: NO PAIN (0)

## 2021-06-17 NOTE — PROGRESS NOTES
Doing well.  Babies active.   Denies contractions, bleeding, or leakage of fluid.     Discussed:  NST concerns, reviewed yesterday's US. Normal growth noted for Twin A, OB appt., HSV treatment, fetal movement    Plan:  NSTs on Monday  BPP on Thursday  Schedule appt with Dr. Lenz @ 32 w gest  Initiate HSV suppression therapy at 32 w    Return to office in 2 weeks for prenatal care and as needed.    RAVINDRA Porter, CNM

## 2021-06-17 NOTE — NURSING NOTE
"Chief Complaint   Patient presents with     Prenatal Care     30w6d       Initial /68 (BP Location: Left arm, Patient Position: Chair, Cuff Size: Adult Regular)   Ht 1.626 m (5' 4\")   Wt 80.7 kg (178 lb)   LMP 11/13/2020   BMI 30.55 kg/m   Estimated body mass index is 30.55 kg/m  as calculated from the following:    Height as of this encounter: 1.626 m (5' 4\").    Weight as of this encounter: 80.7 kg (178 lb).  Medication Reconciliation: complete  Lizeth Sinha LPN    "

## 2021-06-17 NOTE — PATIENT INSTRUCTIONS
Return to office in 2 week(s) for prenatal care and as needed.    If you think your bag of water is broke; have bleeding like a period; think your in labor; or are worried about your baby's movement; please call the labor and delivery unit @ [phone].    Thank you for allowing Jesus Alberto WAITE CNM and our OB team to participate in your care.  If you have a scheduling or an appointment question please contact  [facility] Health Unit Coordinator at their direct line [phone].   ALL nursing questions or concerns can be directed to your OB nurse at: [phone] Ross Stanley/Lennie

## 2021-06-21 ENCOUNTER — HOSPITAL ENCOUNTER (OUTPATIENT)
Facility: HOSPITAL | Age: 36
Discharge: HOME | End: 2021-06-21
Attending: ADVANCED PRACTICE MIDWIFE | Admitting: ADVANCED PRACTICE MIDWIFE
Payer: COMMERCIAL

## 2021-06-21 VITALS
RESPIRATION RATE: 16 BRPM | TEMPERATURE: 98.1 F | SYSTOLIC BLOOD PRESSURE: 118 MMHG | OXYGEN SATURATION: 97 % | DIASTOLIC BLOOD PRESSURE: 76 MMHG

## 2021-06-21 PROCEDURE — 59025 FETAL NON-STRESS TEST: CPT | Mod: 59

## 2021-06-21 PROCEDURE — 59025 FETAL NON-STRESS TEST: CPT | Mod: 26 | Performed by: ADVANCED PRACTICE MIDWIFE

## 2021-06-21 PROCEDURE — 59025 FETAL NON-STRESS TEST: CPT

## 2021-06-21 NOTE — PLAN OF CARE
Rachel Carvajal        NST:  reactive  Start:1730  Stop:1750    Physician: Ernie  Reason For Test: Twins  EDC:8/20/21  Gestational Age: 31.3    Comments:  One variable noted on baby a.       Paty Anna RN

## 2021-06-21 NOTE — DISCHARGE INSTRUCTIONS
Discharge Instructions for Undelivered Patients    Diet:  * Drink 8 to 12 glasses of liquids (milk, juice, water) every day  * You may eat meals and snacks.    Activity:  * Count fetal kicks every day.  * Call your doctor if your baby is moving less than usual.    Call your provider if you notice:  * Swelling in your face or increased swelling in your hands or legs.  * Headaches that are not relieved by Tylenol (acetaminophen).  * Changes in your vision (blurring; seeing spots or stars).  * Nausea (sick to your stomach) and vomiting (throwing up).  * Weight gain of 5 pounds per week.  * Heartburn that doesn't go away.  * Signs of bladder infection: Pain when you urinate (use the toilet), needing to go more often or more urgently.  * The bag of nunez (membrane) breaks, or you notice leaking in your underwear.  * Bright red blood in your underwear.  * Abdominal (lower belly) or stomach pain.  *If kemar prior to 36 weeks, please come in to be assessed!  * Increase or change in vaginal discharge (note the color and amount).        [facility]: [phone]

## 2021-06-21 NOTE — PROVIDER NOTIFICATION
Ernie notified of patient arrival, states can be discharged when she has a reactive NST. New verbal order placed for NST.

## 2021-06-21 NOTE — PROVIDER NOTIFICATION
Ernie updated on one variable on baby a which is the girl. States as long as they are not recurring and she passes she can still be discharged.

## 2021-06-21 NOTE — PLAN OF CARE
Pt arrived via ambulation to the unit for weekly NST.  Pt denies any cramping/contractions, bleeding, discharge or leaking of fluid at this time.  Pt states both babies have been moving like normal.  No pt concerns at this time. VITAL SIGNS STABLE.

## 2021-06-21 NOTE — PLAN OF CARE
Pt educated on avs discharge instructions and told to come back in if she has contractions before 36 weeks, or if she has any vaginal discharge, bleeding, leaking of fluid, or decreased fetal movement.  Pt also educated on making sure she drinks plenty of fluid and avoiding caffeinated beverages d/t those dehydrating her instead of hydrating her.  Pt verbalized understanding and presents no further questions at this time.

## 2021-06-24 ENCOUNTER — HOSPITAL ENCOUNTER (OUTPATIENT)
Dept: ULTRASOUND IMAGING | Facility: HOSPITAL | Age: 36
End: 2021-06-24
Attending: ADVANCED PRACTICE MIDWIFE
Payer: COMMERCIAL

## 2021-06-24 DIAGNOSIS — O30.049 DICHORIONIC DIAMNIOTIC TWIN PREGNANCY, ANTEPARTUM: ICD-10-CM

## 2021-06-24 PROCEDURE — 76815 OB US LIMITED FETUS(S): CPT | Mod: XS

## 2021-06-24 PROCEDURE — 76815 OB US LIMITED FETUS(S): CPT

## 2021-06-24 PROCEDURE — 76820 UMBILICAL ARTERY ECHO: CPT | Mod: 59

## 2021-06-28 ENCOUNTER — HOSPITAL ENCOUNTER (EMERGENCY)
Facility: HOSPITAL | Age: 36
End: 2021-06-28
Payer: COMMERCIAL

## 2021-06-28 ENCOUNTER — HOSPITAL ENCOUNTER (OUTPATIENT)
Facility: HOSPITAL | Age: 36
Discharge: HOME | End: 2021-06-28
Attending: ADVANCED PRACTICE MIDWIFE | Admitting: ADVANCED PRACTICE MIDWIFE
Payer: COMMERCIAL

## 2021-06-28 ENCOUNTER — APPOINTMENT (OUTPATIENT)
Dept: ULTRASOUND IMAGING | Facility: HOSPITAL | Age: 36
End: 2021-06-28
Attending: ADVANCED PRACTICE MIDWIFE
Payer: COMMERCIAL

## 2021-06-28 ENCOUNTER — APPOINTMENT (OUTPATIENT)
Dept: ULTRASOUND IMAGING | Facility: HOSPITAL | Age: 36
End: 2021-06-28
Attending: OBSTETRICS & GYNECOLOGY
Payer: COMMERCIAL

## 2021-06-28 ENCOUNTER — MYC REFILL (OUTPATIENT)
Dept: FAMILY MEDICINE | Facility: OTHER | Age: 36
End: 2021-06-28

## 2021-06-28 VITALS
OXYGEN SATURATION: 98 % | TEMPERATURE: 98.1 F | RESPIRATION RATE: 16 BRPM | DIASTOLIC BLOOD PRESSURE: 76 MMHG | SYSTOLIC BLOOD PRESSURE: 121 MMHG

## 2021-06-28 DIAGNOSIS — A60.00 HERPES SIMPLEX INFECTION OF GENITOURINARY SYSTEM: ICD-10-CM

## 2021-06-28 PROBLEM — K35.30 ACUTE APPENDICITIS WITH LOCALIZED PERITONITIS: Status: RESOLVED | Noted: 2018-06-09 | Resolved: 2021-06-28

## 2021-06-28 PROBLEM — O09.529 ELDERLY MULTIGRAVIDA WITH ANTEPARTUM CONDITION OR COMPLICATION: Status: ACTIVE | Noted: 2021-06-28

## 2021-06-28 PROCEDURE — 258N000003 HC RX IP 258 OP 636: Performed by: OBSTETRICS & GYNECOLOGY

## 2021-06-28 PROCEDURE — 59025 FETAL NON-STRESS TEST: CPT | Mod: 59

## 2021-06-28 PROCEDURE — 76819 FETAL BIOPHYS PROFIL W/O NST: CPT

## 2021-06-28 PROCEDURE — 76820 UMBILICAL ARTERY ECHO: CPT | Mod: 59

## 2021-06-28 PROCEDURE — 59025 FETAL NON-STRESS TEST: CPT

## 2021-06-28 PROCEDURE — 59025 FETAL NON-STRESS TEST: CPT | Mod: 26 | Performed by: ADVANCED PRACTICE MIDWIFE

## 2021-06-28 PROCEDURE — G0463 HOSPITAL OUTPT CLINIC VISIT: HCPCS | Mod: 25

## 2021-06-28 PROCEDURE — 96360 HYDRATION IV INFUSION INIT: CPT

## 2021-06-28 RX ADMIN — SODIUM CHLORIDE, POTASSIUM CHLORIDE, SODIUM LACTATE AND CALCIUM CHLORIDE 1000 ML: 600; 310; 30; 20 INJECTION, SOLUTION INTRAVENOUS at 20:12

## 2021-06-28 NOTE — TELEPHONE ENCOUNTER
Acyclovir   Last Office Visit: 6/17/21  Last Refill Date:6/11/21  # 30          Refills  2      Thank you!

## 2021-06-29 ENCOUNTER — MYC REFILL (OUTPATIENT)
Dept: FAMILY MEDICINE | Facility: OTHER | Age: 36
End: 2021-06-29

## 2021-06-29 DIAGNOSIS — A60.00 HERPES SIMPLEX INFECTION OF GENITOURINARY SYSTEM: ICD-10-CM

## 2021-06-29 RX ORDER — ACYCLOVIR 400 MG/1
400 TABLET ORAL 2 TIMES DAILY
Qty: 60 TABLET | Refills: 0 | Status: ON HOLD | OUTPATIENT
Start: 2021-06-29 | End: 2021-07-31

## 2021-06-29 NOTE — PLAN OF CARE
"Pt arrived ambulatory at 1910 for scheduled NST. VSS.  Monitors applied having difficulty keeping Baby A on monitor. Noted some decrease in HR. B.ciara A-. Baby B FHT -145-reactive. NST inconclusive on Baby A at this time. and Dr Avina called in. Ultrasound here at this time and Dr Avina on unit. Fluid bolus infusing at this time. Call light within reach.   Rachel Carvajal      Baby B  NST:  reactive  Start:1940  Stop:2000    Physician: Dr Avina  Reason For Test: Twins  EDC:08/20/2021  Gestational Age: 32W3D   Rachel Carvajal      Baby A  NST:  inconclusive  Start:1940   Stop:2010    Physician: Dr Avina  Reason For Test: Twins  EDC:08/20/2021  Gestational Age: 32W3D    2100-Comments:  Ultrasound completed . Dr Avina reports it \"looks good\" and biophysical 8. normal . Per Dr Avina can discharge pt to home at this time. Pt ambulatory in no acute distress.       Melany Cramer RN        Comments:  Pt will f/u with Provider and continue with NST's. Pt verbalizes understanding.      Melany Cramer RN      "

## 2021-06-30 RX ORDER — ACYCLOVIR 400 MG/1
400 TABLET ORAL 2 TIMES DAILY
Qty: 60 TABLET | Refills: 0 | OUTPATIENT
Start: 2021-06-30

## 2021-07-01 ENCOUNTER — HOSPITAL ENCOUNTER (OUTPATIENT)
Dept: ULTRASOUND IMAGING | Facility: HOSPITAL | Age: 36
End: 2021-07-01
Attending: ADVANCED PRACTICE MIDWIFE
Payer: COMMERCIAL

## 2021-07-01 ENCOUNTER — PRENATAL OFFICE VISIT (OUTPATIENT)
Dept: OBGYN | Facility: OTHER | Age: 36
End: 2021-07-01
Attending: OBSTETRICS & GYNECOLOGY
Payer: COMMERCIAL

## 2021-07-01 VITALS
HEIGHT: 63 IN | DIASTOLIC BLOOD PRESSURE: 70 MMHG | SYSTOLIC BLOOD PRESSURE: 112 MMHG | BODY MASS INDEX: 31.54 KG/M2 | WEIGHT: 178 LBS | OXYGEN SATURATION: 98 % | HEART RATE: 93 BPM

## 2021-07-01 DIAGNOSIS — O30.043 DICHORIONIC DIAMNIOTIC TWIN PREGNANCY IN THIRD TRIMESTER: Primary | ICD-10-CM

## 2021-07-01 DIAGNOSIS — O30.049 DICHORIONIC DIAMNIOTIC TWIN PREGNANCY, ANTEPARTUM: ICD-10-CM

## 2021-07-01 DIAGNOSIS — O32.9XX0: ICD-10-CM

## 2021-07-01 PROCEDURE — 76820 UMBILICAL ARTERY ECHO: CPT | Mod: 59

## 2021-07-01 PROCEDURE — 76820 UMBILICAL ARTERY ECHO: CPT

## 2021-07-01 PROCEDURE — 76815 OB US LIMITED FETUS(S): CPT

## 2021-07-01 PROCEDURE — 99213 OFFICE O/P EST LOW 20 MIN: CPT | Performed by: OBSTETRICS & GYNECOLOGY

## 2021-07-01 ASSESSMENT — PAIN SCALES - GENERAL: PAINLEVEL: NO PAIN (0)

## 2021-07-01 ASSESSMENT — MIFFLIN-ST. JEOR: SCORE: 1466.53

## 2021-07-01 NOTE — NURSING NOTE
"Chief Complaint   Patient presents with     Consult     Consult/ Ernie/ Gayla twins     Prenatal Care     32w 6d       Initial /70 (BP Location: Left arm, Patient Position: Sitting, Cuff Size: Adult Regular)   Pulse 93   Ht 1.6 m (5' 3\")   Wt 80.7 kg (178 lb)   LMP 11/13/2020   SpO2 98%   BMI 31.53 kg/m   Estimated body mass index is 31.53 kg/m  as calculated from the following:    Height as of this encounter: 1.6 m (5' 3\").    Weight as of this encounter: 80.7 kg (178 lb).  Medication Reconciliation: complete  KADY LOCKHART LPN    "

## 2021-07-01 NOTE — PROGRESS NOTES
"Consult R: Ernie BARBOSA for Di/Di twins  HPI:  Rachel Carvajal is a 35 year old female  (vag) Patient's last menstrual period was 2020. at 32w6d, Estimated Date of Delivery: Aug 20, 2021.  She denies vaginal bleeding, CTX, LOF and abdominal pain.   + FM x 2. No other c/o.  Prenatal record reviewed and unchanged.   Genetic screening nml.   Pregnancy complicated by  Question isolated growth restriction baby A.  However f/u level 2 US showed nml growth and anatomy on both twins on .  A f/u US for growth 4-6 weeks was recommended.  Also of note babies in breech/transvers position.    She has US scheduled today.  Had BPP/NST earlier this week.      Past Medical History:   Diagnosis Date     Acute appendicitis with localized peritonitis 2018     Depressive disorder      Herpes simplex infection of genitourinary system 2017     Mild episode of recurrent major depressive disorder (H) 2016       Past Surgical History:   Procedure Laterality Date     ENT SURGERY      TONSILECTOMY     GYN SURGERY      invetro fertilization     LAPAROSCOPIC APPENDECTOMY  06/10/2018    [facility]       Allergies: Penicillins     ROS:   Denies fever, wt loss   Neg /GI other than per HPI      EXAM:  Blood pressure 112/70, pulse 93, height 1.6 m (5' 3\"), weight 80.7 kg (178 lb), last menstrual period 2020, SpO2 98 %, unknown if currently breastfeeding.   BMI= Body mass index is 31.53 kg/m .  General - pleasant female in no acute distress.      ASSESSMENT/PLAN:  Di/Di twins at 32 6/7 weeks.  H/o suspected FGR/marginal cord insertion on twin A but f/u US showed nml growth.  Weekly doppler/NST scheduled.  Would recommend instead of NST to do BPP with her dopplers as NST's have been problematic getting good strip on both twins.     We also discussed malpresentation and if persists recommend  for delivery.  Pt is in agreement with this plan and also desires permanent sterilization at that time.  We " reviewed the risks and benefits of tubal ligation/salpingectomy procedures including risks of bleeding, infection, damage to other organs. Risks of tubal failure, and possibility of ectopic pregnancy were also explained. We discussed alternative forms of birth control.  Desires salpingectomy if CS.  Federal sterilization forms reviewed and signed.     If testing continues to be normal then would plan delivery at 38 weeks     US growth ordered 1-2 weeks.    I will see her back at 36 weeks for revaluation and if malpresentation persists plan CS/salpingectomy.         James Lenz MD

## 2021-07-08 ENCOUNTER — HOSPITAL ENCOUNTER (OUTPATIENT)
Dept: ULTRASOUND IMAGING | Facility: HOSPITAL | Age: 36
End: 2021-07-08
Attending: ADVANCED PRACTICE MIDWIFE
Payer: COMMERCIAL

## 2021-07-08 DIAGNOSIS — O30.049 DICHORIONIC DIAMNIOTIC TWIN PREGNANCY, ANTEPARTUM: ICD-10-CM

## 2021-07-08 PROCEDURE — 76820 UMBILICAL ARTERY ECHO: CPT | Mod: 59

## 2021-07-08 PROCEDURE — 76819 FETAL BIOPHYS PROFIL W/O NST: CPT | Mod: 59

## 2021-07-15 ENCOUNTER — PRENATAL OFFICE VISIT (OUTPATIENT)
Dept: OBGYN | Facility: OTHER | Age: 36
End: 2021-07-15
Attending: ADVANCED PRACTICE MIDWIFE
Payer: COMMERCIAL

## 2021-07-15 ENCOUNTER — HOSPITAL ENCOUNTER (OUTPATIENT)
Dept: ULTRASOUND IMAGING | Facility: HOSPITAL | Age: 36
End: 2021-07-15
Attending: ADVANCED PRACTICE MIDWIFE
Payer: COMMERCIAL

## 2021-07-15 VITALS
WEIGHT: 177 LBS | DIASTOLIC BLOOD PRESSURE: 77 MMHG | HEIGHT: 63 IN | BODY MASS INDEX: 31.36 KG/M2 | SYSTOLIC BLOOD PRESSURE: 120 MMHG

## 2021-07-15 DIAGNOSIS — O09.93 SUPERVISION OF HIGH RISK PREGNANCY, UNSPECIFIED, THIRD TRIMESTER: Primary | ICD-10-CM

## 2021-07-15 DIAGNOSIS — O30.049 DICHORIONIC DIAMNIOTIC TWIN PREGNANCY, ANTEPARTUM: ICD-10-CM

## 2021-07-15 PROCEDURE — 76820 UMBILICAL ARTERY ECHO: CPT | Mod: 59

## 2021-07-15 PROCEDURE — 76816 OB US FOLLOW-UP PER FETUS: CPT | Mod: 59

## 2021-07-15 PROCEDURE — 99207 PR PRENATAL VISIT: CPT | Performed by: ADVANCED PRACTICE MIDWIFE

## 2021-07-15 ASSESSMENT — MIFFLIN-ST. JEOR: SCORE: 1462

## 2021-07-15 ASSESSMENT — PAIN SCALES - GENERAL: PAINLEVEL: NO PAIN (0)

## 2021-07-15 NOTE — PROGRESS NOTES
Doing well.  Babies active.   Denies contractions, bleeding, or leakage of fluid.     Discussed:  GBS, pre-op, labor, when to go to hospital, fetal movement    Plan:  Next visit:   GBS PCR   Pre-op if needed    Return to office in 1 weeks for prenatal care and as needed.    Jesus Alberto Klein, APRN, CNM

## 2021-07-15 NOTE — NURSING NOTE
"Chief Complaint   Patient presents with     Prenatal Care     34w6d       Initial /77 (BP Location: Left arm, Patient Position: Chair, Cuff Size: Adult Regular)   Ht 1.6 m (5' 3\")   Wt 80.3 kg (177 lb)   LMP 11/13/2020   BMI 31.35 kg/m   Estimated body mass index is 31.35 kg/m  as calculated from the following:    Height as of this encounter: 1.6 m (5' 3\").    Weight as of this encounter: 80.3 kg (177 lb).  Medication Reconciliation: complete  Lizeth Sinha LPN    "

## 2021-07-15 NOTE — PATIENT INSTRUCTIONS
Return to office in 1 week(s) for prenatal care and as needed.    If you think your bag of water is broke; have bleeding like a period; think your in labor; or are worried about your baby's movement; please call the labor and delivery unit @ [phone].    Thank you for allowing Jesus Alberto WAITE CNM and our OB team to participate in your care.  If you have a scheduling or an appointment question please contact  [facility] Health Unit Coordinator at their direct line [phone].   ALL nursing questions or concerns can be directed to your OB nurse at: [phone] Ross Stanley/Lennie

## 2021-07-20 NOTE — PROGRESS NOTES
Fetal Non-Stress Test Results    NST Ordered By: Dr. Lenz  NST Medical Indication: twin gestation    NST Start & Stop Times  NST Start Time: 1455  NST Stop Time: 1515    NST Start Time (Fetus B): 1455  NST Stop Time (Fetus B): 1515                    NST Results  Fetus A   Baseline Rate: Normal  Accelerations: Present  Decelerations: None  Interpretation: reactive

## 2021-07-22 ENCOUNTER — PRENATAL OFFICE VISIT (OUTPATIENT)
Dept: OBGYN | Facility: OTHER | Age: 36
End: 2021-07-22
Attending: ADVANCED PRACTICE MIDWIFE
Payer: COMMERCIAL

## 2021-07-22 ENCOUNTER — HOSPITAL ENCOUNTER (OUTPATIENT)
Dept: ULTRASOUND IMAGING | Facility: HOSPITAL | Age: 36
End: 2021-07-22
Attending: ADVANCED PRACTICE MIDWIFE
Payer: COMMERCIAL

## 2021-07-22 ENCOUNTER — PRENATAL OFFICE VISIT (OUTPATIENT)
Dept: OBGYN | Facility: OTHER | Age: 36
End: 2021-07-22
Attending: OBSTETRICS & GYNECOLOGY
Payer: COMMERCIAL

## 2021-07-22 ENCOUNTER — PREP FOR PROCEDURE (OUTPATIENT)
Dept: OBGYN | Facility: OTHER | Age: 36
End: 2021-07-22

## 2021-07-22 VITALS
DIASTOLIC BLOOD PRESSURE: 73 MMHG | BODY MASS INDEX: 31.71 KG/M2 | WEIGHT: 179 LBS | HEIGHT: 63 IN | SYSTOLIC BLOOD PRESSURE: 122 MMHG

## 2021-07-22 VITALS
WEIGHT: 179.4 LBS | SYSTOLIC BLOOD PRESSURE: 112 MMHG | OXYGEN SATURATION: 98 % | DIASTOLIC BLOOD PRESSURE: 70 MMHG | BODY MASS INDEX: 31.79 KG/M2 | HEART RATE: 90 BPM | HEIGHT: 63 IN

## 2021-07-22 DIAGNOSIS — O30.049 DICHORIONIC DIAMNIOTIC TWIN PREGNANCY: Primary | ICD-10-CM

## 2021-07-22 DIAGNOSIS — O30.049 DICHORIONIC DIAMNIOTIC TWIN PREGNANCY, ANTEPARTUM: ICD-10-CM

## 2021-07-22 DIAGNOSIS — O09.93 SUPERVISION OF HIGH RISK PREGNANCY, UNSPECIFIED, THIRD TRIMESTER: Primary | ICD-10-CM

## 2021-07-22 DIAGNOSIS — O30.043 DICHORIONIC DIAMNIOTIC TWIN PREGNANCY IN THIRD TRIMESTER: ICD-10-CM

## 2021-07-22 DIAGNOSIS — Z01.812 ENCOUNTER FOR PREOPERATIVE SCREENING LABORATORY TESTING FOR COVID-19 VIRUS: ICD-10-CM

## 2021-07-22 DIAGNOSIS — O32.9XX0: ICD-10-CM

## 2021-07-22 DIAGNOSIS — Z30.2 ENCOUNTER FOR STERILIZATION: ICD-10-CM

## 2021-07-22 DIAGNOSIS — Z01.818 PREOP GENERAL PHYSICAL EXAM: Primary | ICD-10-CM

## 2021-07-22 DIAGNOSIS — Z11.52 ENCOUNTER FOR PREOPERATIVE SCREENING LABORATORY TESTING FOR COVID-19 VIRUS: ICD-10-CM

## 2021-07-22 PROCEDURE — 99207 PR COMPLICATED OB VISIT: CPT | Performed by: OBSTETRICS & GYNECOLOGY

## 2021-07-22 PROCEDURE — 76820 UMBILICAL ARTERY ECHO: CPT

## 2021-07-22 PROCEDURE — 76819 FETAL BIOPHYS PROFIL W/O NST: CPT

## 2021-07-22 ASSESSMENT — MIFFLIN-ST. JEOR
SCORE: 1471.07
SCORE: 1472.88

## 2021-07-22 ASSESSMENT — PAIN SCALES - GENERAL
PAINLEVEL: MODERATE PAIN (4)
PAINLEVEL: NO PAIN (0)

## 2021-07-22 NOTE — PATIENT INSTRUCTIONS
Preparing for Your Surgery  Getting started  A nurse will call you to review your health history and instructions. They will give you an arrival time based on your scheduled surgery time.  Please be ready to share the following:    Your doctor's clinic name and phone number    Your medical, surgical and anesthesia history    A list of allergies and sensitivities    A list of medicines, including herbal treatments and over-the-counter drugs    Whether the patient has a legal guardian (ask how to send us the papers in advance)  If you have a child who's having surgery, please ask for a copy of Preparing for Your Child's Surgery.    Preparing for surgery    Within 30 days of surgery: Have a pre-op exam (sometimes called an H&P, or History and Physical). This can be done at a clinic or pre-operative center.  ? If you're having a , you may not need this exam. Talk to your care team    At your pre-op exam, talk to your care team about all medicines you take. If you need to stop any medicines before surgery, ask when to start taking them again.  ? We do this for your safety. Many medicines can make you bleed too much during surgery. Some change how well surgery (anesthesia) drugs work.    Call your insurance company to let them know you're having surgery. (If you don't have insurance, call [phone].)    Call your clinic if there's any change in your health. This includes signs of a cold or flu (sore throat, runny nose, cough, rash, fever). It also includes a scrape or scratch near the surgery site.    If you have questions on the day of surgery, call your hospital or surgery center.  Eating and drinking guidelines  For your safety: Unless your surgeon tells you otherwise, follow the guidelines below.    Eat and drink as usual until 8 hours before surgery. After that, no food or milk.    Drink clear liquids until 2 hours before surgery. These are liquids you can see through, like water, Gatorade and Propel  Water. You may also have black coffee and tea (no cream or milk).    Nothing by mouth within 2 hours of surgery. This includes gum, candy and breath mints.    If you drink, stop drinking alcohol the night before surgery.    If your care team tells you to take medicine on the morning of surgery, it's okay to take it with a sip of water.  Preventing infection    Shower or bathe the night before and morning of your surgery. Follow the instructions your clinic gave you. (If no instructions, use regular soap.)    Don't shave or clip hair near your surgery site. We'll remove the hair if needed.    Don't smoke or vape the morning of surgery. You may chew nicotine gum up to 2 hours before surgery. A nicotine patch is okay.  ? Note: Some surgeries require you to completely quit smoking and nicotine. Check with your surgeon.    Your care team will make every effort to keep you safe from infection. We will:  ? Clean our hands often with soap and water (or an alcohol-based hand rub).  ? Clean the skin at your surgery site with a special soap that kills germs.  ? Give you a special gown to keep you warm. (Cold raises the risk of infection.)  ? Wear special hair covers, masks, gowns and gloves during surgery.  ? Give antibiotic medicine, if prescribed. Not all surgeries need antibiotics.  What to bring on the day of surgery    Photo ID and insurance card    Copy of your health care directive, if you have one    Glasses and hearing aides (bring cases)  ? You can't wear contacts during surgery    Inhaler and eye drops, if you use them (tell us about these when you arrive)    CPAP machine or breathing device, if you use them    A few personal items, if spending the night    If you have . . .  ? A pacemaker or ICD (cardiac defibrillator): Bring the ID card.  ? An implanted stimulator: Bring the remote control.  ? A legal guardian: Bring a copy of the certified (court-stamped) guardianship papers.  Please remove any jewelry, including  body piercings. Leave jewelry and other valuables at home.  If you're going home the day of surgery  Important: If you don't follow the rules below, we must cancel your surgery.     Arrange for someone to drive you home after surgery. You may not drive, take a taxi or take public transportation by yourself (unless you'll have local anesthesia only).    Arrange for a responsible adult to stay with you overnight. If you don't, we may keep you in the hospital overnight, and you may need to pay the costs yourself.  Questions?   If you have any questions for your care team, list them here: _________________________________________________________________________________________________________________________________________________________________________________________________________________________________________________________________________________________________________________________  For informational purposes only. Not to replace the advice of your health care provider. Copyright   2003, 2019 [facility]. All rights reserved. Clinically reviewed by Yari Agee MD. [employer]ART[employer] 069523 - REV 4/20.    Return to office in 1 week(s) for prenatal care and as needed.    If you think your bag of water is broke; have bleeding like a period; think your in labor; or are worried about your baby's movement; please call the labor and delivery unit @ [phone].    Thank you for allowing Jesus Alberto WAITE CNM and our OB team to participate in your care.  If you have a scheduling or an appointment question please contact  [facility] Coordinator at their direct line [phone].   ALL nursing questions or concerns can be directed to your OB nurse at: [phone] Ross Stanley/Lennie

## 2021-07-22 NOTE — PROGRESS NOTES
Fetal Non-Stress Test Results    NST Ordered By: RAVINDRA Oh CNM    NST Medical Indication: twin one IGUR    NST Start & Stop Times  NST Start Time: 1623  NST Stop Time: 1800                            NST Results  Fetus B   Baseline Rate: 135  Accelerations: Present  Decelerations: variable noted  Interpretation: reactive

## 2021-07-22 NOTE — PROGRESS NOTES
Fetal Non-Stress Test Results    NST Ordered By: RAVINDRA Oh CNM    NST Medical Indication: twins    NST Start & Stop Times  NST Start Time: 1940  NST Stop Time: 2007    NST Start Time (Fetus B): 1940  NST Stop Time (Fetus B): 2000                    NST Results  Fetus B   Baseline Rate: 130  Accelerations: Present  Decelerations: None  Interpretation: reactive

## 2021-07-22 NOTE — PROGRESS NOTES
Fetal Non-Stress Test Results    NST Ordered By: RAVINDRA Oh CNM    NST Medical Indication: twins, twin a is IUGR    NST Start & Stop Times  NST Start Time: 1730  NST Stop Time: 1750    NST Start Time (Fetus B): 1730  NST Stop Time (Fetus B): 1750                    NST Results  Fetus A   Baseline Rate: 130  Accelerations: Present  Decelerations: None  Interpretation: reactive

## 2021-07-22 NOTE — NURSING NOTE
"Chief Complaint   Patient presents with     Prenatal Care     35w6d       Initial /73 (BP Location: Left arm, Patient Position: Chair, Cuff Size: Adult Regular)   Ht 1.6 m (5' 3\")   Wt 81.2 kg (179 lb)   LMP 11/13/2020   BMI 31.71 kg/m   Estimated body mass index is 31.71 kg/m  as calculated from the following:    Height as of this encounter: 1.6 m (5' 3\").    Weight as of this encounter: 81.2 kg (179 lb).  Medication Reconciliation: complete  Lizeth Sinha LPN    "

## 2021-07-22 NOTE — PROGRESS NOTES
Fetal Non-Stress Test Results    NST Ordered By: RAVINDRA Oh CNM    NST Medical Indication: twins, twin a is IUGR    NST Start & Stop Times  NST Start Time: 1730  NST Stop Time: 1750    NST Start Time (Fetus B): 1730  NST Stop Time (Fetus B): 1750                    NST Results  Fetus B   Baseline Rate: 130  Accelerations: Present  Decelerations: None  Interpretation: reactive

## 2021-07-22 NOTE — PROGRESS NOTES
Doing well.  Babies active.   Denies  bleeding, or leakage of fluid. Some mild contractions    Discussed:  Pre-op exam, reviewed US, BPP, and Dopplers, GBS screening, fetal (s) movement    Plan:  GBS PCR  Pre-op visit  BPP today  Schedule Covid screening    Return to office in 1 weeks for prenatal care and as needed.    RAVINDRA Porter, CNM

## 2021-07-22 NOTE — NURSING NOTE
"Chief Complaint   Patient presents with     Prenatal Care     35w 6d Ernie pt with twins       Initial /70 (BP Location: Left arm, Cuff Size: Adult Regular)   Pulse 90   Ht 1.6 m (5' 3\")   Wt 81.4 kg (179 lb 6.4 oz)   LMP 11/13/2020   SpO2 98%   BMI 31.78 kg/m   Estimated body mass index is 31.78 kg/m  as calculated from the following:    Height as of this encounter: 1.6 m (5' 3\").    Weight as of this encounter: 81.4 kg (179 lb 6.4 oz).  Medication Reconciliation: complete  Ledy Wiklerson LPN  "

## 2021-07-22 NOTE — PROGRESS NOTES
[place] [id number] [facility] [address]  Phone: [phone]  Fax: [phone]  Primary Provider: Mary Rankin  Pre-op Performing Provider: SHARRON PARSONS    PREOPERATIVE EVALUATION:  Today's date: 2021    Rachel Carvajal is a 35 year old female who presents for a preoperative evaluation.    Surgical Information:  Surgery/Procedure:  section with bilateral salpingectomy   [place] [facility] [place]geon: Dr. James Lenz   Surgery Date: 2021  Time of Surgery: TBD   Where patient plans to recover: [facility]   Fax number for surgical facility: Note does not need to be faxed, will be available electronically in Epic.    Type of Anesthesia Anticipated: to be determined    1. No - Have you ever had a heart attack or stroke?  2. No - Have you ever had surgery on your heart or blood vessels, such as a stent, coronary (heart) bypass, or surgery on an artery in the head, neck, heart, or legs?  3. No - Do you have chest pain when you are physically active?  4. No - Do you have a history of heart failure?  5. No - Do you currently have a cold, bronchitis, or symptoms of other respiratory (head and chest) infections?  6. No - Do you have a cough, shortness of breath, or wheezing?  7. No - Do you or anyone in your family have a history of blood clots?  8. No - Do you or anyone in your family have a serious bleeding problem, such as long-lasting bleeding after surgeries or cuts?  9. No - Have you ever had anemia or been told to take iron pills?  10. No - Have you had any abnormal blood loss such as black, tarry or bloody stools, or abnormal vaginal bleeding?  11. No - Have you ever had a blood transfusion?  12. Yes - Are you willing to have a blood transfusion if it is medically needed before, during, or after your surgery?  13. No - Have you or anyone in your family ever had problems with anesthesia (sedation for surgery)?  14. No - Do you have  sleep apnea, excessive snoring, or daytime drowsiness?   15. No - Do you have any artifical heart valves or other implanted medical devices, such as a pacemaker, defibrillator, or continuous glucose monitor?  16. No - Do you have any artifical joints?  17. No - Are you allergic to latex?  18. Yes - Is there any chance that you may be pregnant? Pt is pregnant.     Assessment & Plan     The proposed surgical procedure is considered INTERMEDIATE risk.    Problem List Items Addressed This Visit        OB/Gyn Diagnoses    Dichorionic diamniotic twin pregnancy    Relevant Orders    Group B strep PCR      Other Visit Diagnoses     Preop general physical exam    -  Primary        RECOMMENDATION:  APPROVAL GIVEN to proceed with proposed procedure, without further diagnostic evaluation.    Review of external notes as documented above   Independent interpretation of a test performed by another physician/other qualified health care professional (not separately reported) -   Discussion of management or test interpretation with external physician/other qualified healthcare professional/appropriate source -     30 minutes spent on the date of the encounter doing chart review, history and exam, documentation and further activities per the note        Subjective     HPI related to upcoming procedure:  Section, with bilateral salpingectomy       No flowsheet data found.    Health Care Directive:  Patient does not have a Health Care Directive or Living Will: Discussed advance care planning with patient; however, patient declined at this time.    Preoperative Review of :   reviewed - no record of controlled substances prescribed.      Status of Chronic Conditions:  See problem list for active medical problems.  Problems all longstanding and stable, except as noted/documented.  See ROS for pertinent symptoms related to these conditions.      Review of Systems  CONSTITUTIONAL: NEGATIVE for fever, chills, change in  weight  ENT/MOUTH: NEGATIVE for ear, mouth and throat problems  RESP: NEGATIVE for significant cough or SOB  CV: NEGATIVE for chest pain, palpitations or peripheral edema    Patient Active Problem List    Diagnosis Date Noted     Antepartum malpresentation of fetus 2021     Priority: Medium     Breech/transvers twins.    F/u appt at 36 weeks Dr. Lenz.  CS if persists with salpingectomy for sterilization.   Federal sterilization forms reviewed and signed.        Elderly multigravida with antepartum condition or complication 2021     Priority: Medium     Glucose intolerance of pregnancy 2021     Priority: Medium     Dichorionic diamniotic twin pregnancy 2021     Priority: Medium     Rh positive  TWINS  Di/Di  Girl/Boy  Failed 1 hr GTT.  Passed 3 hr GTT   WEEKLY  US for BPP with Doppler.  Also weekly NST.  ECHO normal for both fetus 1 and 2  NOTE:  Normal growth for Twin A (1) on 21:  Doppler shows mildly elevated SD ratio for Twin A  Hx of  x 2  AMA  DNA negative   Hx of genital HSV  Initiate suppression therapy @ 32 w  Baby doc:  [place]  FOB:  [facility]  Girls:  Matt 8 and Rylee 3       Herpes simplex infection of genitourinary system 2017     Priority: Medium     Advance care planning 2016     Priority: Medium     Advance Care Planning 2016: ACP Review of Chart / Resources Provided:  Reviewed chart for advance care plan.  Rachel Villarreal has been provided information and resources to begin or update their advance care plan.  Added by Skylar Rocha           Mild episode of recurrent major depressive disorder (H) 2016     Priority: Medium      Past Medical History:   Diagnosis Date     Acute appendicitis with localized peritonitis 2018     Depressive disorder      Herpes simplex infection of genitourinary system 2017     Mild episode of recurrent major depressive disorder (H) 2016     Past Surgical History:   Procedure Laterality Date  "    ENT SURGERY  2001    TONSILECTOMY     GYN SURGERY      invetro fertilization     LAPAROSCOPIC APPENDECTOMY  06/10/2018    [facility]     Current Outpatient Medications   Medication Sig Dispense Refill     acyclovir (ZOVIRAX) 400 MG tablet Take 1 tablet (400 mg) by mouth 2 times daily 60 tablet 0     doxylamine (UNISOM) 25 MG TABS tablet Take 12.5 mg by mouth nightly as needed       Prenatal MV-Min-Fe Fum-FA-DHA (PRENATAL 1 PO) Take 1 tablet by mouth daily       pyridOXINE (VITAMIN B-6) 50 MG tablet Take 50 mg by mouth daily        sertraline (ZOLOFT) 100 MG tablet TAKE 1 TABLET(100 MG) BY MOUTH DAILY 30 tablet 2       Allergies   Allergen Reactions     Penicillins Rash        Social History     Tobacco Use     Smoking status: Never Smoker     Smokeless tobacco: Never Used   Substance Use Topics     Alcohol use: Not Currently     Alcohol/week: 0.0 standard drinks     Comment: rare     Family History   Problem Relation Age of Onset     Hypertension Mother      Other Cancer Maternal Grandfather      Diabetes Paternal Grandfather      History   Drug Use No         Objective     /73 (BP Location: Left arm, Patient Position: Chair, Cuff Size: Adult Regular)   Ht 1.6 m (5' 3\")   Wt 81.2 kg (179 lb)   LMP 2020   BMI 31.71 kg/m      Physical Exam  GENERAL APPEARANCE: healthy, alert and no distress  HENT: TM's normal and nose and mouth without ulcers or lesions  RESP: lungs clear to auscultation - no rales, rhonchi or wheezes  CV: regular rate and rhythm, normal S1 S2, no S3 or S4 and no murmur, click or rub   ABDOMEN:  NEURO: Normal strength and tone, sensory exam grossly normal, mentation intact and speech normal    Recent Labs   Lab Test 06/10/21  0905 21  1429 21  1558   HGB  --  11.0* 12.2   PLT  --  284 251   A1C 5.2  --   --         Diagnostics:  No labs were ordered during this visit.   No EKG required for low risk surgery (cataract, skin procedure, breast biopsy, " etc).    Revised Cardiac Risk Index (RCRI):  The patient has the following serious cardiovascular risks for perioperative complications:   - No serious cardiac risks = 0 points     RCRI Interpretation: 0 points: Class I (very low risk - 0.4% complication rate)       Signed Electronically by: RAVINDRA Oh CNM  Copy of this evaluation report is provided to requesting physician.

## 2021-07-22 NOTE — PROGRESS NOTES
Fetal Non-Stress Test Results    NST Ordered By: RAVINDRA Oh CNM    NST Medical Indication: twin one IGUR    NST Start & Stop Times  NST Start Time: 1623  NST Stop Time: 1800                            NST Results  Fetus A   Baseline Rate: 140  Accelerations: Present  Decelerations: None  Interpretation: reactive

## 2021-07-22 NOTE — PROGRESS NOTES
Di/Di twins.  Breech/transverse.  Desires sterilization. Plan CS and bilateral salpingectomy. Scheduled at 38 weeks 21.    US (BPP/umbillical dopplers and routine PNV scheduled later today)  H&P reviewed and unchanged from prenatal record.  Reveiwed goals, risks, alternatives of a  section/salpingectomy including bleeding, infection, and potential damage to other organs including bowel, bladder, baby, blood vessels and nerves.   Hosptial stay and recovery period discussed.  All questions were answered.      James Lenz MD

## 2021-07-23 LAB
GP B STREP DNA SPEC QL NAA+PROBE: NEGATIVE
PATIENT PENICILLIN, AMOXICILLIN, CEPHALOSPORINS ALLERGY: YES

## 2021-07-27 ENCOUNTER — PRENATAL OFFICE VISIT (OUTPATIENT)
Dept: OBGYN | Facility: OTHER | Age: 36
End: 2021-07-27
Attending: ADVANCED PRACTICE MIDWIFE
Payer: COMMERCIAL

## 2021-07-27 ENCOUNTER — HOSPITAL ENCOUNTER (OUTPATIENT)
Facility: HOSPITAL | Age: 36
Discharge: HOME | End: 2021-07-27
Attending: ADVANCED PRACTICE MIDWIFE | Admitting: ADVANCED PRACTICE MIDWIFE
Payer: COMMERCIAL

## 2021-07-27 ENCOUNTER — APPOINTMENT (OUTPATIENT)
Dept: ULTRASOUND IMAGING | Facility: HOSPITAL | Age: 36
End: 2021-07-27
Attending: ADVANCED PRACTICE MIDWIFE
Payer: COMMERCIAL

## 2021-07-27 VITALS
OXYGEN SATURATION: 97 % | RESPIRATION RATE: 18 BRPM | SYSTOLIC BLOOD PRESSURE: 128 MMHG | BODY MASS INDEX: 32.25 KG/M2 | HEIGHT: 63 IN | DIASTOLIC BLOOD PRESSURE: 85 MMHG | HEART RATE: 94 BPM | TEMPERATURE: 98.5 F | WEIGHT: 182 LBS

## 2021-07-27 VITALS
HEIGHT: 63 IN | WEIGHT: 182 LBS | SYSTOLIC BLOOD PRESSURE: 141 MMHG | DIASTOLIC BLOOD PRESSURE: 86 MMHG | BODY MASS INDEX: 32.25 KG/M2

## 2021-07-27 DIAGNOSIS — O30.043 DICHORIONIC DIAMNIOTIC TWIN PREGNANCY IN THIRD TRIMESTER: Primary | ICD-10-CM

## 2021-07-27 LAB
ALBUMIN UR-MCNC: NEGATIVE MG/DL
ALT SERPL W P-5'-P-CCNC: 44 U/L (ref 0–50)
APPEARANCE UR: CLEAR
AST SERPL W P-5'-P-CCNC: 31 U/L (ref 0–45)
BILIRUB UR QL STRIP: NEGATIVE
COLOR UR AUTO: YELLOW
GLUCOSE UR STRIP-MCNC: NEGATIVE MG/DL
HGB BLD-MCNC: 12.6 G/DL (ref 11.7–15.7)
HGB UR QL STRIP: NEGATIVE
KETONES UR STRIP-MCNC: NEGATIVE MG/DL
LEUKOCYTE ESTERASE UR QL STRIP: ABNORMAL
NITRATE UR QL: NEGATIVE
PH UR STRIP: 7 [PH] (ref 5–7)
PLATELET # BLD AUTO: 318 10E3/UL (ref 150–450)
RBC #/AREA URNS AUTO: ABNORMAL /HPF
SP GR UR STRIP: <=1.005 (ref 1–1.03)
SQUAMOUS #/AREA URNS AUTO: ABNORMAL /LPF
UROBILINOGEN UR STRIP-ACNC: 0.2 E.U./DL
WBC #/AREA URNS AUTO: ABNORMAL /HPF

## 2021-07-27 PROCEDURE — 87086 URINE CULTURE/COLONY COUNT: CPT | Performed by: ADVANCED PRACTICE MIDWIFE

## 2021-07-27 PROCEDURE — 99207 PR PRENATAL VISIT: CPT | Performed by: ADVANCED PRACTICE MIDWIFE

## 2021-07-27 PROCEDURE — 59025 FETAL NON-STRESS TEST: CPT

## 2021-07-27 PROCEDURE — G0463 HOSPITAL OUTPT CLINIC VISIT: HCPCS | Mod: 25

## 2021-07-27 PROCEDURE — 59025 FETAL NON-STRESS TEST: CPT | Mod: 59

## 2021-07-27 PROCEDURE — 84450 TRANSFERASE (AST) (SGOT): CPT | Performed by: ADVANCED PRACTICE MIDWIFE

## 2021-07-27 PROCEDURE — 81001 URINALYSIS AUTO W/SCOPE: CPT | Performed by: ADVANCED PRACTICE MIDWIFE

## 2021-07-27 PROCEDURE — 85018 HEMOGLOBIN: CPT | Performed by: ADVANCED PRACTICE MIDWIFE

## 2021-07-27 PROCEDURE — 76819 FETAL BIOPHYS PROFIL W/O NST: CPT

## 2021-07-27 PROCEDURE — 84460 ALANINE AMINO (ALT) (SGPT): CPT | Performed by: ADVANCED PRACTICE MIDWIFE

## 2021-07-27 PROCEDURE — 85049 AUTOMATED PLATELET COUNT: CPT | Performed by: ADVANCED PRACTICE MIDWIFE

## 2021-07-27 PROCEDURE — 87088 URINE BACTERIA CULTURE: CPT | Performed by: ADVANCED PRACTICE MIDWIFE

## 2021-07-27 PROCEDURE — 36415 COLL VENOUS BLD VENIPUNCTURE: CPT | Performed by: ADVANCED PRACTICE MIDWIFE

## 2021-07-27 PROCEDURE — 59025 FETAL NON-STRESS TEST: CPT | Mod: 26 | Performed by: ADVANCED PRACTICE MIDWIFE

## 2021-07-27 ASSESSMENT — PAIN SCALES - GENERAL: PAINLEVEL: NO PAIN (0)

## 2021-07-27 ASSESSMENT — MIFFLIN-ST. JEOR
SCORE: 1484.68
SCORE: 1484.68

## 2021-07-27 NOTE — NURSING NOTE
"Chief Complaint   Patient presents with     Prenatal Care     36w4d       Initial BP (!) 141/86 (BP Location: Left arm, Patient Position: Chair, Cuff Size: Adult Regular)   Ht 1.6 m (5' 3\")   Wt 82.6 kg (182 lb)   LMP 11/13/2020   BMI 32.24 kg/m   Estimated body mass index is 32.24 kg/m  as calculated from the following:    Height as of this encounter: 1.6 m (5' 3\").    Weight as of this encounter: 82.6 kg (182 lb).  Medication Reconciliation: complete  Lizeth Sinha LPN    "

## 2021-07-27 NOTE — PLAN OF CARE
OB Triage Note  Rachel Carvajal  MRN: [id number]  Gestational Age: 36w4d      Rachel Carvajal presents for decreased fetal movement, elevated bp in clinic, generally not feeling well. (sign/symptom/concern).      Patient denies contractions, bleeding, LOF. Patient denies pain.    Jesus Alberto Klein notified of arrival and condition.  Oriented patient to surroundings. Call light within reach.     Baby A  FHT: 145  NST Start Time:1530  NST Stop Time:1648  NST: Appropriate for Gestational Age .  Uterine Assessment:Contractions: frequency q 9-10 minutes of not felt by patient quality.    Baby B    FHT:135  NST:  Appropriate for Gestational Age  Start:1530  Stop:1648        Daria Mendiola RN        Plan:  -Initial NST, then fetal/uterine monitoring per MD/patient plan.  -Sterile vaginal exam/sterile speculum exam.  -Nursing education on when to return to the hospital, and sign of preeclampsia provided.  Per Jesus Alberto would like to see patient back in the clinic on . Appt made.

## 2021-07-27 NOTE — PLAN OF CARE
"Rachel Carvajal is a 35 year old  and 36w4d patient came in complaining of Decreased Fetal Movement    Patient Active Problem List   Diagnosis     Mild episode of recurrent major depressive disorder (H)     Advance care planning     Herpes simplex infection of genitourinary system     Dichorionic diamniotic twin pregnancy     Glucose intolerance of pregnancy     Elderly multigravida with antepartum condition or complication     Antepartum malpresentation of fetus       Pt discharged to home at 5:08 PM and encouraged to rest and drink plenty of fluids.  Pt told to call/return if bleeding more than spotting, water breaks, contractions 3-5 minutes apart that she has to breath through.     Nursing education on when to return to the hospital, and sign of preeclampsia provided. Self-management instructions reviewed. No new medications or therapies ordered. AVS given and signed. All questions answered and patient verbalizes understanding.    /85   Pulse 94   Temp 98.5  F (36.9  C) (Oral)   Resp 18   Ht 1.6 m (5' 3\")   Wt 82.6 kg (182 lb)   LMP 2020   SpO2 97%   BMI 32.24 kg/m      Cervical status: not examined  Fetal Assessment: Appropriate for Gestational Age    Discharge support:  Family/Friend Support    OB History    Para Term  AB Living   5 2 2 0 1 2   SAB TAB Ectopic Multiple Live Births   1 0 0 0 2      # Outcome Date GA Lbr Sharif/2nd Weight Sex Delivery Anes PTL Lv   5 Current            4 SAB 20 10w0d    SAB   ND      Birth Comments: SAB   3 Term 17 39w0d / 01:47 3.295 kg (7 lb 4.2 oz) F Vag-Spont EPI  LILIANE      Name: STARLA FORTE      Apgar1: 9  Apgar5: 9   2 Term 12 40w0d  3.204 kg (7 lb 1 oz) F Vag-Spont EPI N LIVE BIRTH      Name: Matt   1                 Daria Mendiola, RN        "

## 2021-07-27 NOTE — PROGRESS NOTES
"Doing well.  Babies seem to be moving less the last couple of days.  Denies contractions, bleeding, or leakage of fluid.     Discussed:  \"feeling off today-fatigue\", B/Ps elevated at home, reports >140/90, preeclampsia signs, fetal movement    Denies headache, distorted vision, epigastric pain, or sudden swelling in face or hands.    Pt's mother here to drive her to hospital.    Plan:  Admit to [facility]  NST/BBP  Serial B/Ps every 15 minutes  ALT, AST, PLT, HGB  UA with micro reflex to culture drawn at [facility]    Return to office in 1 weeks for prenatal care and as needed.    Jesus Alberto Klein, RAVINDRA, CNM    "

## 2021-07-27 NOTE — PATIENT INSTRUCTIONS
Return to office in 1 week(s) for prenatal care and as needed.    If you think your bag of water is broke; have bleeding like a period; think your in labor; or are worried about your baby's movement; please call the labor and delivery unit @ [phone].    Thank you for allowing Jesus Alberto WAITE CNM and our OB team to participate in your care.  If you have a scheduling or an appointment question please contact  [facility] Unit Coordinator at their direct line [phone].   ALL nursing questions or concerns can be directed to your OB nurse at: [phone] Ross Stanley/Lennie

## 2021-07-27 NOTE — DISCHARGE INSTRUCTIONS

## 2021-07-29 ENCOUNTER — HOSPITAL ENCOUNTER (OUTPATIENT)
Dept: ULTRASOUND IMAGING | Facility: HOSPITAL | Age: 36
End: 2021-07-29
Attending: ADVANCED PRACTICE MIDWIFE
Payer: COMMERCIAL

## 2021-07-29 ENCOUNTER — PRENATAL OFFICE VISIT (OUTPATIENT)
Dept: OBGYN | Facility: OTHER | Age: 36
End: 2021-07-29
Attending: OBSTETRICS & GYNECOLOGY
Payer: COMMERCIAL

## 2021-07-29 ENCOUNTER — TELEPHONE (OUTPATIENT)
Dept: OBGYN | Facility: HOSPITAL | Age: 36
End: 2021-07-29

## 2021-07-29 ENCOUNTER — ANESTHESIA EVENT (OUTPATIENT)
Dept: SURGERY | Facility: HOSPITAL | Age: 36
End: 2021-07-29
Payer: COMMERCIAL

## 2021-07-29 VITALS
WEIGHT: 179 LBS | BODY MASS INDEX: 31.71 KG/M2 | HEIGHT: 63 IN | DIASTOLIC BLOOD PRESSURE: 84 MMHG | SYSTOLIC BLOOD PRESSURE: 142 MMHG

## 2021-07-29 DIAGNOSIS — Z30.2 ENCOUNTER FOR STERILIZATION: ICD-10-CM

## 2021-07-29 DIAGNOSIS — O30.043 DICHORIONIC DIAMNIOTIC TWIN PREGNANCY IN THIRD TRIMESTER: Primary | ICD-10-CM

## 2021-07-29 DIAGNOSIS — O30.049 DICHORIONIC DIAMNIOTIC TWIN PREGNANCY, ANTEPARTUM: ICD-10-CM

## 2021-07-29 DIAGNOSIS — A60.00 HERPES SIMPLEX INFECTION OF GENITOURINARY SYSTEM: ICD-10-CM

## 2021-07-29 DIAGNOSIS — O13.3 GESTATIONAL HYPERTENSION, THIRD TRIMESTER: ICD-10-CM

## 2021-07-29 DIAGNOSIS — O32.9XX0: ICD-10-CM

## 2021-07-29 DIAGNOSIS — Z01.818 PRE-OP EXAM: ICD-10-CM

## 2021-07-29 DIAGNOSIS — F33.0 MILD EPISODE OF RECURRENT MAJOR DEPRESSIVE DISORDER (H): ICD-10-CM

## 2021-07-29 DIAGNOSIS — O09.529 ELDERLY MULTIGRAVIDA WITH ANTEPARTUM CONDITION OR COMPLICATION: ICD-10-CM

## 2021-07-29 LAB
BACTERIA UR CULT: ABNORMAL
BACTERIA UR CULT: ABNORMAL
SARS-COV-2 RNA RESP QL NAA+PROBE: NEGATIVE

## 2021-07-29 PROCEDURE — U0003 INFECTIOUS AGENT DETECTION BY NUCLEIC ACID (DNA OR RNA); SEVERE ACUTE RESPIRATORY SYNDROME CORONAVIRUS 2 (SARS-COV-2) (CORONAVIRUS DISEASE [COVID-19]), AMPLIFIED PROBE TECHNIQUE, MAKING USE OF HIGH THROUGHPUT TECHNOLOGIES AS DESCRIBED BY CMS-2020-01-R: HCPCS | Performed by: OBSTETRICS & GYNECOLOGY

## 2021-07-29 PROCEDURE — 76819 FETAL BIOPHYS PROFIL W/O NST: CPT

## 2021-07-29 PROCEDURE — 99214 OFFICE O/P EST MOD 30 MIN: CPT | Performed by: OBSTETRICS & GYNECOLOGY

## 2021-07-29 PROCEDURE — 76820 UMBILICAL ARTERY ECHO: CPT | Mod: 59

## 2021-07-29 PROCEDURE — U0005 INFEC AGEN DETEC AMPLI PROBE: HCPCS | Performed by: OBSTETRICS & GYNECOLOGY

## 2021-07-29 ASSESSMENT — MIFFLIN-ST. JEOR: SCORE: 1471.07

## 2021-07-29 ASSESSMENT — PAIN SCALES - GENERAL: PAINLEVEL: NO PAIN (0)

## 2021-07-29 NOTE — H&P (VIEW-ONLY)
OB Consult Note / Pre-op Note    CHIEF COMPLAINT / REASON FOR VISIT  Patient presents for obstetrical consultation at the request of her primary provider, Dr. Jesus Alberto Klein CNM, for hypertension in pregnancy.    HISTORY OF PRESENT ILLNESS  Rachel Carvajal is a 35 year old . Patient's last menstrual period was 2020. Her Estimated Date of Delivery: Aug 20, 2021 determined by LMP and consistent with 6 wk ultrasound. Gestational age is 36w6d. She has a diamniotic, dichorionic twin pregnancy. She presents to the office today for OB consultation for hypertension.     She reports good fetal movement x 2. She denies leaking of fluid or vaginal bleeding. She denies cramping or contractions. She denies pelvic pressure. She denies difficulty urinating. She denies headache or vision changes, shortness of breath, right upper quadrant pain, chest pain, or significant edema. She denies depression symptoms on medication.  She denies any genital herpes symptoms or lesions on medication.  The patient requests elective sterilization.    The patient's pregnancy is complicated by diamniotic, dichorionic twin pregnancy, advanced maternal age, abnormal fetal presentation with breech/ transverse position, depression on medication, history of genital herpes on antiviral prophylaxis, requested elective sterilization.    ALLERGIES  Allergies   Allergen Reactions     Penicillins Rash     MEDICATIONS    Current Outpatient Medications:      acyclovir (ZOVIRAX) 400 MG tablet, Take 1 tablet (400 mg) by mouth 2 times daily, Disp: 60 tablet, Rfl: 0     doxylamine (UNISOM) 25 MG TABS tablet, Take 12.5 mg by mouth nightly as needed, Disp: , Rfl:      Prenatal MV-Min-Fe Fum-FA-DHA (PRENATAL 1 PO), Take 1 tablet by mouth daily, Disp: , Rfl:      sertraline (ZOLOFT) 100 MG tablet, TAKE 1 TABLET(100 MG) BY MOUTH DAILY, Disp: 30 tablet, Rfl: 2    REVIEW OF SYSTEMS  General:  No fever, chills, fatigue, unintentional weight loss, or weight  gain  HEENT:  No sore throat, nasal congestion, changes in vision or changes in hearing  Cardiovascular:  No chest pain, irregular heartbeat or racing heart  Respiratory:  No shortness of breath, cough, or wheezing  Gastrointestinal:  No nausea, vomiting, diarrhea, constipation or abdominal pain  Genitourinary:  No leaking urine, pain with urination, burning with urination, irregular vaginal bleeding, heavy periods, painful periods, abnormal vaginal discharge or leaking gas or stool  Skin:  No rashes or skin lesions  Breasts:  No masses or lumps, positive for discharge from the nipples  Neuro:  No difficulty with memory, numbness, tingling, or falls  Psych:  No anxiety, depression or difficulty sleeping  Endocrine:  No excessive thirst, hair loss, intolerance of heat or cold    Past Medical History:   Diagnosis Date     Acute appendicitis with localized peritonitis 2018     Dichorionic diamniotic twin pregnancy 2021    AMA  DNA negative  Hx of genital HSV  Initiate suppression therapy @ 32 w Gestational HTN Depression Elective sterilization TWINS  Di/Di  Girl/Boy Failed 1 hr GTT.  Passed 3 hr GTT  WEEKLY  US for BPP with Doppler.  Also weekly NST.  ECHO normal for both fetus 1 and 2 NOTE:  Normal growth for Twin A (1) on 6/16/21 7/15/21:  Dopplers normal for both  EFW:  Twin A 5th %tile  Twin B 46th%tile Rh posi     Elderly multigravida with antepartum condition or complication 2021     Gestational hypertension, third trimester 2021     Glucose intolerance of pregnancy 2021    Failed screen, Normal 3 hour GTT     Herpes simplex infection of genitourinary system 2017     Mild episode of recurrent major depressive disorder (H) 2016     Past Surgical History:   Procedure Laterality Date     ENT SURGERY      TONSILECTOMY     GYN SURGERY      invetro fertilization     LAPAROSCOPIC APPENDECTOMY  06/10/2018    [facility]     OB History    Para Term  AB Living  "  5 2 2 0 1 2   SAB TAB Ectopic Multiple Live Births   1 0 0 0 2      # Outcome Date GA Lbr Sharif/2nd Weight Sex Delivery Anes PTL Lv   5 Current            4 SAB 20 10w0d    SAB   ND      Birth Comments: SAB   3 Term 17 39w0d / 01:47 3.295 kg (7 lb 4.2 oz) F Vag-Spont EPI  LILIANE      Name: STARLA FORTE      Apgar1: 9  Apgar5: 9   2 Term 12 40w0d  3.204 kg (7 lb 1 oz) F Vag-Spont EPI N LIVE BIRTH      Name: Matt Cavanaugh               Social History     Tobacco Use     Smoking status: Never Smoker     Smokeless tobacco: Never Used   Substance Use Topics     Alcohol use: Not Currently     Alcohol/week: 0.0 standard drinks     Comment: rare     History   Sexual Activity     Sexual activity: Yes     Partners: Male     Birth control/ protection: None     Family History   Problem Relation Age of Onset     Hypertension Mother      Other Cancer Maternal Grandfather      Diabetes Paternal Grandfather      OBJECTIVE  BP (!) 142/84   Ht 1.6 m (5' 3\")   Wt 81.2 kg (179 lb)   LMP 2020   BMI 31.71 kg/m    /82, 142/84  General:  Well-developed well-nourished gravid female in no apparent distress.  Neurological: Alert and oriented x3.  Skin:  No rashes or lesions  Head:  Normocephalic, atraumatic  ENT:  Oropharynx clear, Trachea midline, no cervical adenopathy, Vision and hearing grossly intact  Lungs:  Clear to auscultation bilaterally with good inspiratory effort.  No wheezing rhonchi or rales noted. Breathing nonlabored.  Heart:  Regular rate and rhythm without murmur. No JVD.  No peripheral vascular disease.  Abdomen: Gravid soft nontender positive bowel sounds. Fundal height at 45 cm.  Fetal heart tones auscultated at 132 and 144.  Presentation noted to be breech/ transverse by Leopold.  Cervix: 0 cm dilated / 20 % / -2 station, breech presentation.  No herpetic lesions on examination.  Extremities:  No clubbing cyanosis or edema. Nontender bilaterally.     Chaperone: Lennie Siu " LPN    DIAGNOSTICS  Prenatal labs  Lab Results   Component Value Date    ABO A 01/26/2021    RH Pos 01/26/2021    AS Neg 01/26/2021    HEPBANG Nonreactive 01/26/2021    TREPT Nonreactive 06/03/2021    RUQIGG 13 01/26/2021    HGB 12.6 07/27/2021    HCT 31.5 (L) 06/03/2021     07/27/2021    GBS Negative 11/10/2017    GBPCRT Negative 07/22/2021    PAP NIL 01/14/2021    CHPCRT  05/11/2017     Negative   Negative for C. trachomatis rRNA by transcription mediated amplification.   A negative result by transcription mediated amplification does not preclude the   presence of C. trachomatis infection because results are dependent on proper   and adequate collection, absence of inhibitors, and sufficient rRNA to be   detected.      GCPCRT  05/11/2017     Negative   Negative for N. gonorrhoeae rRNA by transcription mediated amplification.   A negative result by transcription mediated amplification does not preclude the   presence of N. gonorrhoeae infection because results are dependent on proper   and adequate collection, absence of inhibitors, and sufficient rRNA to be   detected.       SARS CoV2 PCR   Date Value Ref Range Status   07/29/2021 Negative Negative Final     Comment:     NEGATIVE: SARS-CoV-2 (COVID-19) RNA not detected, presumed negative.     Pre-eclampsia labs  Recent Labs   Lab Test 07/27/21  1545 07/27/21  1542 06/03/21  1429 01/26/21  1558 11/27/17  0535 11/25/17  1853 05/11/17  1430   HGB  --  12.6 11.0* 12.2 8.9* 10.9* 11.6*   HCT  --   --  31.5* 34.8*  --  32.8* 33.6*   PLT  --  318 284 251  --  370 229   AST 31  --   --   --   --   --   --    ALT 44  --   --   --   --   --   --      07/27/2021 UA: negative protein    07/29/2021 OB ultrasound: Normal amniotic fluid for both twins. Twin A in the breech position to the maternal left has a fetal heart rate of 145. The placenta is posterior. Twin B is in transverse  position with the head to the maternal right has a fetal heart rate of 155 the placenta  is fundal.  Impression: Biophysical profile score is 8 for both twins    2021 Umbilical artery doppler ultrasound: Twin A in breech presentation has an abnormally elevated umbilical artery Doppler  systolic to diastolic ratio of 3.6.  Twin B in transverse lie has a normal umbilical artery Doppler with a systolic to diastolic ratio of 2.4.    ASSESSMENT / PLAN  35 year old  at 36w6d who presents in consultation for hypertension in pregnancy.    1 Twin diamniotic, dichorionic pregnancy at 36w6d.  2 Gestational hypertension  3 Advanced maternal age  4 Abnormal fetal presentation with breech/ transverse position  5 Depression  6 History of genital herpes on antiviral prophylaxis  7 Request elective sterilization    - I discussed the patient's symptoms and findings.  She was seen earlier this week with mild range hypertension and negative labs.  She continues to have mild range elevated blood pressure.  She denies any symptoms of severe disease.  She has no proteinuria.  The patient has gestational hypertension with mild range blood pressure and no signs or symptoms of severe disease or preeclampsia. Her BPP today is 8/8 for both twins and the umbilical artery Doppler ratio is elevated at 3.6 for twin A and normal at 2.4 for twin B.  - I discussed gestational hypertension with mild range blood pressures and recommend that we deliver at 37 weeks.  The patient is a twin diamniotic, dichorionic pregnancy with abnormal fetal presentation with breech/ transverse position.  She is already scheduled for  section with bilateral salpingectomy next week.  Due to her gestational hypertension, I discussed options and recommend that we deliver at 37 weeks.  The patient verbalizes understanding and desires to proceed with delivery.  - I discussed risks, benefits, alternatives, indications, and expected outcomes of the  section with bilateral salpingectomy procedure with the patient including the risk of  infection, bleeding, need for blood transfusion, injury to organs with need for repair, anesthesia risk, deep vein thrombosis risk, cardiovascular risk, unexpected findings, failure to relieve symptoms, recurrence of symptoms and rarely death. I discussed the routine recovery process, post-operative pain medication, use of a butler catheter and the anticipated hospital stay.  - I reviewed the risk, benefits, alternatives, indications, and expected outcomes of elective sterilization with the patient. I reviewed the permanent nature of sterilization. I reviewed the sterilization failure rate of up to 1%. I reviewed the risk for ectopic pregnancy after sterilization.  I reviewed the increased risk for post-sterilization depression and regret, especially for women under the age of 30. I discussed the recovery period and the expected discomfort. All of the patient's questions were answered.  - The [place] sterilization consent form was previously signed.  - She verbalized understanding and desired to proceed.  - The patient asked appropriate questions and these were answered for her.  - The consent form was reviewed and signed.  - The patient is scheduled for  section with bilateral salpingectomy procedure on 2021 at 37 0/7 wk.  - Preoperative instructions were discussed.  - The patient will be NPO after midnight.  - I reviewed routine obstetrical precautions, recommendations and instructions with the patient including fetal movement and kick count instructions.  - I reviewed labor precautions with the patient with instructions for the patient to come in for decreased fetal movement, suspected rupture of membranes, regular contraction pattern, vaginal bleeding or any other concerning symptoms.    - Problem list reviewed and updated.    - 35 minute visit with 25 minutes spent in counseling and coordinating care.    Nitin Avina MD  Obstetrics and Gynecology    CC: PRIMARY CARE PROVIDER: Dr. Rankin,  Mary DONOVAN and Dr. Jesus Alberto Klein [facility]

## 2021-07-29 NOTE — PROGRESS NOTES
OB Consult Note / Pre-op Note    CHIEF COMPLAINT / REASON FOR VISIT  Patient presents for obstetrical consultation at the request of her primary provider, Dr. Jesus Alberto Klein CNM, for hypertension in pregnancy.    HISTORY OF PRESENT ILLNESS  Rachel Carvajal is a 35 year old . Patient's last menstrual period was 2020. Her Estimated Date of Delivery: Aug 20, 2021 determined by LMP and consistent with 6 wk ultrasound. Gestational age is 36w6d. She has a diamniotic, dichorionic twin pregnancy. She presents to the office today for OB consultation for hypertension.     She reports good fetal movement x 2. She denies leaking of fluid or vaginal bleeding. She denies cramping or contractions. She denies pelvic pressure. She denies difficulty urinating. She denies headache or vision changes, shortness of breath, right upper quadrant pain, chest pain, or significant edema. She denies depression symptoms on medication.  She denies any genital herpes symptoms or lesions on medication.  The patient requests elective sterilization.    The patient's pregnancy is complicated by diamniotic, dichorionic twin pregnancy, advanced maternal age, abnormal fetal presentation with breech/ transverse position, depression on medication, history of genital herpes on antiviral prophylaxis, requested elective sterilization.    ALLERGIES  Allergies   Allergen Reactions     Penicillins Rash     MEDICATIONS    Current Outpatient Medications:      acyclovir (ZOVIRAX) 400 MG tablet, Take 1 tablet (400 mg) by mouth 2 times daily, Disp: 60 tablet, Rfl: 0     doxylamine (UNISOM) 25 MG TABS tablet, Take 12.5 mg by mouth nightly as needed, Disp: , Rfl:      Prenatal MV-Min-Fe Fum-FA-DHA (PRENATAL 1 PO), Take 1 tablet by mouth daily, Disp: , Rfl:      sertraline (ZOLOFT) 100 MG tablet, TAKE 1 TABLET(100 MG) BY MOUTH DAILY, Disp: 30 tablet, Rfl: 2    REVIEW OF SYSTEMS  General:  No fever, chills, fatigue, unintentional weight loss, or weight  gain  HEENT:  No sore throat, nasal congestion, changes in vision or changes in hearing  Cardiovascular:  No chest pain, irregular heartbeat or racing heart  Respiratory:  No shortness of breath, cough, or wheezing  Gastrointestinal:  No nausea, vomiting, diarrhea, constipation or abdominal pain  Genitourinary:  No leaking urine, pain with urination, burning with urination, irregular vaginal bleeding, heavy periods, painful periods, abnormal vaginal discharge or leaking gas or stool  Skin:  No rashes or skin lesions  Breasts:  No masses or lumps, positive for discharge from the nipples  Neuro:  No difficulty with memory, numbness, tingling, or falls  Psych:  No anxiety, depression or difficulty sleeping  Endocrine:  No excessive thirst, hair loss, intolerance of heat or cold    Past Medical History:   Diagnosis Date     Acute appendicitis with localized peritonitis 2018     Dichorionic diamniotic twin pregnancy 2021    AMA  DNA negative  Hx of genital HSV  Initiate suppression therapy @ 32 w Gestational HTN Depression Elective sterilization TWINS  Di/Di  Girl/Boy Failed 1 hr GTT.  Passed 3 hr GTT  WEEKLY  US for BPP with Doppler.  Also weekly NST.  ECHO normal for both fetus 1 and 2 NOTE:  Normal growth for Twin A (1) on 6/16/21 7/15/21:  Dopplers normal for both  EFW:  Twin A 5th %tile  Twin B 46th%tile Rh posi     Elderly multigravida with antepartum condition or complication 2021     Gestational hypertension, third trimester 2021     Glucose intolerance of pregnancy 2021    Failed screen, Normal 3 hour GTT     Herpes simplex infection of genitourinary system 2017     Mild episode of recurrent major depressive disorder (H) 2016     Past Surgical History:   Procedure Laterality Date     ENT SURGERY      TONSILECTOMY     GYN SURGERY      invetro fertilization     LAPAROSCOPIC APPENDECTOMY  06/10/2018    [facility]     OB History    Para Term  AB Living  "  5 2 2 0 1 2   SAB TAB Ectopic Multiple Live Births   1 0 0 0 2      # Outcome Date GA Lbr Sharfi/2nd Weight Sex Delivery Anes PTL Lv   5 Current            4 SAB 20 10w0d    SAB   ND      Birth Comments: SAB   3 Term 17 39w0d / 01:47 3.295 kg (7 lb 4.2 oz) F Vag-Spont EPI  LILIANE      Name: STARLA FROTE      Apgar1: 9  Apgar5: 9   2 Term 12 40w0d  3.204 kg (7 lb 1 oz) F Vag-Spont EPI N LIVE BIRTH      Name: Matt Cavanaugh               Social History     Tobacco Use     Smoking status: Never Smoker     Smokeless tobacco: Never Used   Substance Use Topics     Alcohol use: Not Currently     Alcohol/week: 0.0 standard drinks     Comment: rare     History   Sexual Activity     Sexual activity: Yes     Partners: Male     Birth control/ protection: None     Family History   Problem Relation Age of Onset     Hypertension Mother      Other Cancer Maternal Grandfather      Diabetes Paternal Grandfather      OBJECTIVE  BP (!) 142/84   Ht 1.6 m (5' 3\")   Wt 81.2 kg (179 lb)   LMP 2020   BMI 31.71 kg/m    /82, 142/84  General:  Well-developed well-nourished gravid female in no apparent distress.  Neurological: Alert and oriented x3.  Skin:  No rashes or lesions  Head:  Normocephalic, atraumatic  ENT:  Oropharynx clear, Trachea midline, no cervical adenopathy, Vision and hearing grossly intact  Lungs:  Clear to auscultation bilaterally with good inspiratory effort.  No wheezing rhonchi or rales noted. Breathing nonlabored.  Heart:  Regular rate and rhythm without murmur. No JVD.  No peripheral vascular disease.  Abdomen: Gravid soft nontender positive bowel sounds. Fundal height at 45 cm.  Fetal heart tones auscultated at 132 and 144.  Presentation noted to be breech/ transverse by Leopold.  Cervix: 0 cm dilated / 20 % / -2 station, breech presentation.  No herpetic lesions on examination.  Extremities:  No clubbing cyanosis or edema. Nontender bilaterally.     Chaperone: Lennie Siu " LPN    DIAGNOSTICS  Prenatal labs  Lab Results   Component Value Date    ABO A 01/26/2021    RH Pos 01/26/2021    AS Neg 01/26/2021    HEPBANG Nonreactive 01/26/2021    TREPT Nonreactive 06/03/2021    RUQIGG 13 01/26/2021    HGB 12.6 07/27/2021    HCT 31.5 (L) 06/03/2021     07/27/2021    GBS Negative 11/10/2017    GBPCRT Negative 07/22/2021    PAP NIL 01/14/2021    CHPCRT  05/11/2017     Negative   Negative for C. trachomatis rRNA by transcription mediated amplification.   A negative result by transcription mediated amplification does not preclude the   presence of C. trachomatis infection because results are dependent on proper   and adequate collection, absence of inhibitors, and sufficient rRNA to be   detected.      GCPCRT  05/11/2017     Negative   Negative for N. gonorrhoeae rRNA by transcription mediated amplification.   A negative result by transcription mediated amplification does not preclude the   presence of N. gonorrhoeae infection because results are dependent on proper   and adequate collection, absence of inhibitors, and sufficient rRNA to be   detected.       SARS CoV2 PCR   Date Value Ref Range Status   07/29/2021 Negative Negative Final     Comment:     NEGATIVE: SARS-CoV-2 (COVID-19) RNA not detected, presumed negative.     Pre-eclampsia labs  Recent Labs   Lab Test 07/27/21  1545 07/27/21  1542 06/03/21  1429 01/26/21  1558 11/27/17  0535 11/25/17  1853 05/11/17  1430   HGB  --  12.6 11.0* 12.2 8.9* 10.9* 11.6*   HCT  --   --  31.5* 34.8*  --  32.8* 33.6*   PLT  --  318 284 251  --  370 229   AST 31  --   --   --   --   --   --    ALT 44  --   --   --   --   --   --      07/27/2021 UA: negative protein    07/29/2021 OB ultrasound: Normal amniotic fluid for both twins. Twin A in the breech position to the maternal left has a fetal heart rate of 145. The placenta is posterior. Twin B is in transverse  position with the head to the maternal right has a fetal heart rate of 155 the placenta  is fundal.  Impression: Biophysical profile score is 8 for both twins    2021 Umbilical artery doppler ultrasound: Twin A in breech presentation has an abnormally elevated umbilical artery Doppler  systolic to diastolic ratio of 3.6.  Twin B in transverse lie has a normal umbilical artery Doppler with a systolic to diastolic ratio of 2.4.    ASSESSMENT / PLAN  35 year old  at 36w6d who presents in consultation for hypertension in pregnancy.    1 Twin diamniotic, dichorionic pregnancy at 36w6d.  2 Gestational hypertension  3 Advanced maternal age  4 Abnormal fetal presentation with breech/ transverse position  5 Depression  6 History of genital herpes on antiviral prophylaxis  7 Request elective sterilization    - I discussed the patient's symptoms and findings.  She was seen earlier this week with mild range hypertension and negative labs.  She continues to have mild range elevated blood pressure.  She denies any symptoms of severe disease.  She has no proteinuria.  The patient has gestational hypertension with mild range blood pressure and no signs or symptoms of severe disease or preeclampsia. Her BPP today is 8/8 for both twins and the umbilical artery Doppler ratio is elevated at 3.6 for twin A and normal at 2.4 for twin B.  - I discussed gestational hypertension with mild range blood pressures and recommend that we deliver at 37 weeks.  The patient is a twin diamniotic, dichorionic pregnancy with abnormal fetal presentation with breech/ transverse position.  She is already scheduled for  section with bilateral salpingectomy next week.  Due to her gestational hypertension, I discussed options and recommend that we deliver at 37 weeks.  The patient verbalizes understanding and desires to proceed with delivery.  - I discussed risks, benefits, alternatives, indications, and expected outcomes of the  section with bilateral salpingectomy procedure with the patient including the risk of  infection, bleeding, need for blood transfusion, injury to organs with need for repair, anesthesia risk, deep vein thrombosis risk, cardiovascular risk, unexpected findings, failure to relieve symptoms, recurrence of symptoms and rarely death. I discussed the routine recovery process, post-operative pain medication, use of a butler catheter and the anticipated hospital stay.  - I reviewed the risk, benefits, alternatives, indications, and expected outcomes of elective sterilization with the patient. I reviewed the permanent nature of sterilization. I reviewed the sterilization failure rate of up to 1%. I reviewed the risk for ectopic pregnancy after sterilization.  I reviewed the increased risk for post-sterilization depression and regret, especially for women under the age of 30. I discussed the recovery period and the expected discomfort. All of the patient's questions were answered.  - The [place] sterilization consent form was previously signed.  - She verbalized understanding and desired to proceed.  - The patient asked appropriate questions and these were answered for her.  - The consent form was reviewed and signed.  - The patient is scheduled for  section with bilateral salpingectomy procedure on 2021 at 37 0/7 wk.  - Preoperative instructions were discussed.  - The patient will be NPO after midnight.  - I reviewed routine obstetrical precautions, recommendations and instructions with the patient including fetal movement and kick count instructions.  - I reviewed labor precautions with the patient with instructions for the patient to come in for decreased fetal movement, suspected rupture of membranes, regular contraction pattern, vaginal bleeding or any other concerning symptoms.    - Problem list reviewed and updated.    - 35 minute visit with 25 minutes spent in counseling and coordinating care.    Nitin Avina MD  Obstetrics and Gynecology    CC: PRIMARY CARE PROVIDER: Dr. Rankin,  Mary DONOVAN and Dr. Jesus Alberto Klein [facility]

## 2021-07-29 NOTE — ANESTHESIA PREPROCEDURE EVALUATION
Anesthesia Pre-Procedure Evaluation    Patient: Rachel Carvajal   MRN: [id number] : 1985        Preoperative Diagnosis: Dichorionic diamniotic twin pregnancy [O30.049]  Encounter for sterilization [Z30.2]   Procedure : Procedure(s):   SECTION, WITH BILATERAL SALPINGECTOMY     Past Medical History:   Diagnosis Date     Acute appendicitis with localized peritonitis 2018     Dichorionic diamniotic twin pregnancy 2021    Rh positive  GBS negative TWINS  Di/Di  Girl/Boy Failed 1 hr GTT.  Passed 3 hr GTT  WEEKLY  US for BPP with Doppler.  Also weekly NST.  ECHO normal for both fetus 1 and 2 NOTE:  Normal growth for Twin A (1) on 6/16/21 7/15/21:  Dopplers normal for both  EFW:  Twin A 5th %tile  Twin B 46th%tile Hx of  x 2 AMA  DNA negative  Hx of genital HSV  Initiate suppression therapy @ 32 w Baby doc:  [facility]     Elderly multigravida with antepartum condition or complication 2021     Glucose intolerance of pregnancy 2021     Herpes simplex infection of genitourinary system 2017     Mild episode of recurrent major depressive disorder (H) 2016      Past Surgical History:   Procedure Laterality Date     ENT SURGERY      TONSILECTOMY     GYN SURGERY      invetro fertilization     LAPAROSCOPIC APPENDECTOMY  06/10/2018    [facility]      Allergies   Allergen Reactions     Penicillins Rash      Social History     Tobacco Use     Smoking status: Never Smoker     Smokeless tobacco: Never Used   Substance Use Topics     Alcohol use: Not Currently     Alcohol/week: 0.0 standard drinks     Comment: rare      Wt Readings from Last 1 Encounters:   21 81.2 kg (179 lb)        Anesthesia Evaluation   Pt has had prior anesthetic.     No history of anesthetic complications       ROS/MED HX  ENT/Pulmonary:       Neurologic:  - neg neurologic ROS     Cardiovascular: Comment: HTN at end of pregnancy during last weekl    (+) hypertension-----    METS/Exercise Tolerance:      Hematologic:  - neg hematologic  ROS     Musculoskeletal:  - neg musculoskeletal ROS     GI/Hepatic:  - neg GI/hepatic ROS     Renal/Genitourinary:  - neg Renal ROS     Endo: Comment: Glucose intolerance of pregnancy    (+) type II DM, Last HgA1c: 5.2, date: 6-10-21,     Psychiatric/Substance Use:     (+) psychiatric history depression     Infectious Disease:  - neg infectious disease ROS     Malignancy:  - neg malignancy ROS     Other:  - neg other ROS          Physical Exam    Airway  airway exam normal      Mallampati: I       Respiratory Devices and Support         Dental  no notable dental history         Cardiovascular   cardiovascular exam normal       Rhythm and rate: regular     Pulmonary   pulmonary exam normal        breath sounds clear to auscultation           OUTSIDE LABS:  CBC:   Lab Results   Component Value Date    WBC 11.2 (H) 06/03/2021    WBC 8.4 01/26/2021    HGB 12.6 07/27/2021    HGB 11.0 (L) 06/03/2021    HCT 31.5 (L) 06/03/2021    HCT 34.8 (L) 01/26/2021     07/27/2021     06/03/2021     BMP: No results found for: NA, POTASSIUM, CHLORIDE, CO2, BUN, CR, GLC  COAGS: No results found for: PTT, INR, FIBR  POC:   Lab Results   Component Value Date    HCG Positive (A) 04/13/2017     HEPATIC:   Lab Results   Component Value Date    ALT 44 07/27/2021    AST 31 07/27/2021     OTHER:   Lab Results   Component Value Date    A1C 5.2 06/10/2021       Anesthesia Plan    ASA Status:  2   NPO Status:  NPO Appropriate    Anesthesia Type: Spinal.              Consents    Anesthesia Plan(s) and associated risks, benefits, and realistic alternatives discussed. Questions answered and patient/representative(s) expressed understanding.     - Discussed with:  Patient      - Extended Intubation/Ventilatory Support Discussed: Yes.      - Patient is DNR/DNI Status: No    Use of blood products discussed: No .     Postoperative Care    Pain management: Peripheral nerve block (Single Shot), Neuraxial  analgesia.   PONV prophylaxis: Ondansetron (or other 5HT-3)     Comments:     7-22-21            RAVINDRA Jansen CRNA

## 2021-07-30 ENCOUNTER — SURGERY (OUTPATIENT)
Age: 36
End: 2021-07-30
Payer: COMMERCIAL

## 2021-07-30 ENCOUNTER — HOSPITAL ENCOUNTER (INPATIENT)
Facility: HOSPITAL | Age: 36
LOS: 1 days | Discharge: HOME | End: 2021-07-31
Attending: OBSTETRICS & GYNECOLOGY | Admitting: OBSTETRICS & GYNECOLOGY
Payer: COMMERCIAL

## 2021-07-30 ENCOUNTER — ANESTHESIA (OUTPATIENT)
Dept: SURGERY | Facility: HOSPITAL | Age: 36
End: 2021-07-30
Payer: COMMERCIAL

## 2021-07-30 DIAGNOSIS — Z30.2 ENCOUNTER FOR STERILIZATION: ICD-10-CM

## 2021-07-30 DIAGNOSIS — O30.049 DICHORIONIC DIAMNIOTIC TWIN PREGNANCY: ICD-10-CM

## 2021-07-30 DIAGNOSIS — O34.219 PREVIOUS CESAREAN DELIVERY, ANTEPARTUM CONDITION OR COMPLICATION: Primary | ICD-10-CM

## 2021-07-30 PROBLEM — O13.3 GESTATIONAL HYPERTENSION, THIRD TRIMESTER: Status: RESOLVED | Noted: 2021-07-29 | Resolved: 2021-07-30

## 2021-07-30 PROBLEM — O14.93 PRE-ECLAMPSIA IN THIRD TRIMESTER: Status: ACTIVE | Noted: 2021-07-30

## 2021-07-30 LAB
ABO/RH(D): NORMAL
ALBUMIN SERPL-MCNC: 2.5 G/DL (ref 3.4–5)
ALP SERPL-CCNC: 125 U/L (ref 40–150)
ALT SERPL W P-5'-P-CCNC: 52 U/L (ref 0–50)
ALT SERPL W P-5'-P-CCNC: 52 U/L (ref 0–50)
ANION GAP SERPL CALCULATED.3IONS-SCNC: 8 MMOL/L (ref 3–14)
ANTIBODY SCREEN: NEGATIVE
AST SERPL W P-5'-P-CCNC: 36 U/L (ref 0–45)
AST SERPL W P-5'-P-CCNC: 36 U/L (ref 0–45)
BASOPHILS # BLD AUTO: 0 10E3/UL (ref 0–0.2)
BASOPHILS NFR BLD AUTO: 0 %
BILIRUB SERPL-MCNC: 0.6 MG/DL (ref 0.2–1.3)
BUN SERPL-MCNC: 6 MG/DL (ref 7–30)
CALCIUM SERPL-MCNC: 8.7 MG/DL (ref 8.5–10.1)
CHLORIDE BLD-SCNC: 107 MMOL/L (ref 94–109)
CO2 SERPL-SCNC: 21 MMOL/L (ref 20–32)
CREAT SERPL-MCNC: 0.58 MG/DL (ref 0.52–1.04)
CREAT UR-MCNC: 48 MG/DL
EOSINOPHIL # BLD AUTO: 0.1 10E3/UL (ref 0–0.7)
EOSINOPHIL NFR BLD AUTO: 1 %
ERYTHROCYTE [DISTWIDTH] IN BLOOD BY AUTOMATED COUNT: 13.2 % (ref 10–15)
GFR SERPL CREATININE-BSD FRML MDRD: >90 ML/MIN/1.73M2
GLUCOSE BLD-MCNC: 86 MG/DL (ref 70–99)
HCT VFR BLD AUTO: 36 % (ref 35–47)
HGB BLD-MCNC: 12.6 G/DL (ref 11.7–15.7)
IMM GRANULOCYTES # BLD: 0.1 10E3/UL
IMM GRANULOCYTES NFR BLD: 1 %
LDH SERPL L TO P-CCNC: 176 U/L (ref 81–234)
LYMPHOCYTES # BLD AUTO: 2.5 10E3/UL (ref 0.8–5.3)
LYMPHOCYTES NFR BLD AUTO: 24 %
MCH RBC QN AUTO: 30.7 PG (ref 26.5–33)
MCHC RBC AUTO-ENTMCNC: 35 G/DL (ref 31.5–36.5)
MCV RBC AUTO: 88 FL (ref 78–100)
MONOCYTES # BLD AUTO: 0.7 10E3/UL (ref 0–1.3)
MONOCYTES NFR BLD AUTO: 7 %
NEUTROPHILS # BLD AUTO: 6.8 10E3/UL (ref 1.6–8.3)
NEUTROPHILS NFR BLD AUTO: 67 %
NRBC # BLD AUTO: 0 10E3/UL
NRBC BLD AUTO-RTO: 0 /100
PLATELET # BLD AUTO: 327 10E3/UL (ref 150–450)
POTASSIUM BLD-SCNC: 3.8 MMOL/L (ref 3.4–5.3)
PROT SERPL-MCNC: 6.3 G/DL (ref 6.8–8.8)
PROT UR-MCNC: 0.16 G/L
PROT/CREAT 24H UR: 0.33 G/G CR (ref 0–0.2)
RBC # BLD AUTO: 4.1 10E6/UL (ref 3.8–5.2)
SODIUM SERPL-SCNC: 136 MMOL/L (ref 133–144)
SPECIMEN EXPIRATION DATE: NORMAL
URATE SERPL-MCNC: 5.6 MG/DL (ref 2.6–6)
WBC # BLD AUTO: 10.3 10E3/UL (ref 4–11)

## 2021-07-30 PROCEDURE — 258N000003 HC RX IP 258 OP 636: Performed by: OBSTETRICS & GYNECOLOGY

## 2021-07-30 PROCEDURE — 999N000141 HC STATISTIC PRE-PROCEDURE NURSING ASSESSMENT: Performed by: OBSTETRICS & GYNECOLOGY

## 2021-07-30 PROCEDURE — 0UB70ZZ EXCISION OF BILATERAL FALLOPIAN TUBES, OPEN APPROACH: ICD-10-PCS | Performed by: OBSTETRICS & GYNECOLOGY

## 2021-07-30 PROCEDURE — 258N000003 HC RX IP 258 OP 636: Performed by: NURSE ANESTHETIST, CERTIFIED REGISTERED

## 2021-07-30 PROCEDURE — 360N000076 HC SURGERY LEVEL 3, PER MIN: Performed by: OBSTETRICS & GYNECOLOGY

## 2021-07-30 PROCEDURE — 250N000011 HC RX IP 250 OP 636: Performed by: OBSTETRICS & GYNECOLOGY

## 2021-07-30 PROCEDURE — 250N000011 HC RX IP 250 OP 636: Performed by: NURSE ANESTHETIST, CERTIFIED REGISTERED

## 2021-07-30 PROCEDURE — 710N000010 HC RECOVERY PHASE 1, LEVEL 2, PER MIN: Performed by: OBSTETRICS & GYNECOLOGY

## 2021-07-30 PROCEDURE — 86803 HEPATITIS C AB TEST: CPT | Performed by: OBSTETRICS & GYNECOLOGY

## 2021-07-30 PROCEDURE — 88307 TISSUE EXAM BY PATHOLOGIST: CPT | Mod: 26 | Performed by: PATHOLOGY

## 2021-07-30 PROCEDURE — 88302 TISSUE EXAM BY PATHOLOGIST: CPT | Mod: TC | Performed by: OBSTETRICS & GYNECOLOGY

## 2021-07-30 PROCEDURE — 59514 CESAREAN DELIVERY ONLY: CPT | Performed by: NURSE ANESTHETIST, CERTIFIED REGISTERED

## 2021-07-30 PROCEDURE — 370N000017 HC ANESTHESIA TECHNICAL FEE, PER MIN: Performed by: OBSTETRICS & GYNECOLOGY

## 2021-07-30 PROCEDURE — 83615 LACTATE (LD) (LDH) ENZYME: CPT | Performed by: OBSTETRICS & GYNECOLOGY

## 2021-07-30 PROCEDURE — 250N000009 HC RX 250: Performed by: NURSE ANESTHETIST, CERTIFIED REGISTERED

## 2021-07-30 PROCEDURE — 120N000001 HC R&B MED SURG/OB

## 2021-07-30 PROCEDURE — 88302 TISSUE EXAM BY PATHOLOGIST: CPT | Mod: 26 | Performed by: PATHOLOGY

## 2021-07-30 PROCEDURE — 84550 ASSAY OF BLOOD/URIC ACID: CPT | Performed by: OBSTETRICS & GYNECOLOGY

## 2021-07-30 PROCEDURE — 84156 ASSAY OF PROTEIN URINE: CPT | Performed by: OBSTETRICS & GYNECOLOGY

## 2021-07-30 PROCEDURE — 86900 BLOOD TYPING SEROLOGIC ABO: CPT | Performed by: OBSTETRICS & GYNECOLOGY

## 2021-07-30 PROCEDURE — 250N000009 HC RX 250: Performed by: OBSTETRICS & GYNECOLOGY

## 2021-07-30 PROCEDURE — 36415 COLL VENOUS BLD VENIPUNCTURE: CPT | Performed by: OBSTETRICS & GYNECOLOGY

## 2021-07-30 PROCEDURE — 272N000001 HC OR GENERAL SUPPLY STERILE: Performed by: OBSTETRICS & GYNECOLOGY

## 2021-07-30 PROCEDURE — 86780 TREPONEMA PALLIDUM: CPT | Performed by: OBSTETRICS & GYNECOLOGY

## 2021-07-30 PROCEDURE — 250N000013 HC RX MED GY IP 250 OP 250 PS 637: Performed by: OBSTETRICS & GYNECOLOGY

## 2021-07-30 PROCEDURE — C9290 INJ, BUPIVACAINE LIPOSOME: HCPCS | Performed by: NURSE ANESTHETIST, CERTIFIED REGISTERED

## 2021-07-30 PROCEDURE — 85004 AUTOMATED DIFF WBC COUNT: CPT | Performed by: OBSTETRICS & GYNECOLOGY

## 2021-07-30 PROCEDURE — 59510 CESAREAN DELIVERY: CPT | Performed by: OBSTETRICS & GYNECOLOGY

## 2021-07-30 PROCEDURE — 80053 COMPREHEN METABOLIC PANEL: CPT | Performed by: OBSTETRICS & GYNECOLOGY

## 2021-07-30 PROCEDURE — 3E0T3BZ INTRODUCTION OF ANESTHETIC AGENT INTO PERIPHERAL NERVES AND PLEXI, PERCUTANEOUS APPROACH: ICD-10-PCS | Performed by: NURSE ANESTHETIST, CERTIFIED REGISTERED

## 2021-07-30 PROCEDURE — 59514 CESAREAN DELIVERY ONLY: CPT | Mod: 80 | Performed by: OBSTETRICS & GYNECOLOGY

## 2021-07-30 PROCEDURE — 64488 TAP BLOCK BI INJECTION: CPT | Mod: XU | Performed by: NURSE ANESTHETIST, CERTIFIED REGISTERED

## 2021-07-30 PROCEDURE — 58611 LIGATE OVIDUCT(S) ADD-ON: CPT | Performed by: OBSTETRICS & GYNECOLOGY

## 2021-07-30 RX ORDER — OXYCODONE HYDROCHLORIDE 5 MG/1
5 TABLET ORAL EVERY 4 HOURS PRN
Status: DISCONTINUED | OUTPATIENT
Start: 2021-07-30 | End: 2021-07-30

## 2021-07-30 RX ORDER — MAGNESIUM SULFATE HEPTAHYDRATE 500 MG/ML
10 INJECTION, SOLUTION INTRAMUSCULAR; INTRAVENOUS
Status: DISCONTINUED | OUTPATIENT
Start: 2021-07-30 | End: 2021-07-31 | Stop reason: HOSPADM

## 2021-07-30 RX ORDER — ONDANSETRON 2 MG/ML
4 INJECTION INTRAMUSCULAR; INTRAVENOUS EVERY 6 HOURS PRN
Status: DISCONTINUED | OUTPATIENT
Start: 2021-07-30 | End: 2021-07-31 | Stop reason: HOSPADM

## 2021-07-30 RX ORDER — OXYTOCIN 10 [USP'U]/ML
10 INJECTION, SOLUTION INTRAMUSCULAR; INTRAVENOUS
Status: DISCONTINUED | OUTPATIENT
Start: 2021-07-30 | End: 2021-07-31 | Stop reason: HOSPADM

## 2021-07-30 RX ORDER — AMOXICILLIN 250 MG
2 CAPSULE ORAL 2 TIMES DAILY
Status: DISCONTINUED | OUTPATIENT
Start: 2021-07-30 | End: 2021-07-31 | Stop reason: HOSPADM

## 2021-07-30 RX ORDER — ONDANSETRON 4 MG/1
4 TABLET, ORALLY DISINTEGRATING ORAL EVERY 30 MIN PRN
Status: DISCONTINUED | OUTPATIENT
Start: 2021-07-30 | End: 2021-07-30

## 2021-07-30 RX ORDER — HALOPERIDOL 5 MG/ML
1 INJECTION INTRAMUSCULAR
Status: DISCONTINUED | OUTPATIENT
Start: 2021-07-30 | End: 2021-07-30

## 2021-07-30 RX ORDER — DIPHENHYDRAMINE HCL 25 MG
25 CAPSULE ORAL EVERY 6 HOURS PRN
Status: DISCONTINUED | OUTPATIENT
Start: 2021-07-30 | End: 2021-07-31 | Stop reason: HOSPADM

## 2021-07-30 RX ORDER — VITAMIN A ACETATE, .BETA.-CAROTENE, ASCORBIC ACID, CHOLECALCIFEROL, .ALPHA.-TOCOPHEROL ACETATE, DL-, THIAMINE MONONITRATE, RIBOFLAVIN, NIACINAMIDE, PYRIDOXINE HYDROCHLORIDE, FOLIC ACID, CYANOCOBALAMIN, CALCIUM CARBONATE, FERROUS FUMARATE, ZINC OXIDE, AND CUPRIC OXIDE 2000; 2000; 120; 400; 22; 1.84; 3; 20; 10; 1; 12; 200; 27; 25; 2 [IU]/1; [IU]/1; MG/1; [IU]/1; MG/1; MG/1; MG/1; MG/1; MG/1; MG/1; UG/1; MG/1; MG/1; MG/1; MG/1
1 TABLET ORAL DAILY
Status: DISCONTINUED | OUTPATIENT
Start: 2021-07-30 | End: 2021-07-31 | Stop reason: HOSPADM

## 2021-07-30 RX ORDER — ACETAMINOPHEN 325 MG/1
975 TABLET ORAL ONCE
Status: COMPLETED | OUTPATIENT
Start: 2021-07-30 | End: 2021-07-30

## 2021-07-30 RX ORDER — ONDANSETRON 2 MG/ML
INJECTION INTRAMUSCULAR; INTRAVENOUS PRN
Status: DISCONTINUED | OUTPATIENT
Start: 2021-07-30 | End: 2021-07-30

## 2021-07-30 RX ORDER — MAGNESIUM SULFATE HEPTAHYDRATE 40 MG/ML
4 INJECTION, SOLUTION INTRAVENOUS
Status: DISCONTINUED | OUTPATIENT
Start: 2021-07-30 | End: 2021-07-31 | Stop reason: HOSPADM

## 2021-07-30 RX ORDER — MEPERIDINE HYDROCHLORIDE 25 MG/ML
12.5 INJECTION INTRAMUSCULAR; INTRAVENOUS; SUBCUTANEOUS
Status: DISCONTINUED | OUTPATIENT
Start: 2021-07-30 | End: 2021-07-30

## 2021-07-30 RX ORDER — AMOXICILLIN 250 MG
1 CAPSULE ORAL 2 TIMES DAILY
Status: DISCONTINUED | OUTPATIENT
Start: 2021-07-30 | End: 2021-07-31 | Stop reason: HOSPADM

## 2021-07-30 RX ORDER — METHYLERGONOVINE MALEATE 0.2 MG/ML
200 INJECTION INTRAVENOUS
Status: DISCONTINUED | OUTPATIENT
Start: 2021-07-30 | End: 2021-07-30 | Stop reason: HOSPADM

## 2021-07-30 RX ORDER — TRANEXAMIC ACID 10 MG/ML
1 INJECTION, SOLUTION INTRAVENOUS EVERY 30 MIN PRN
Status: DISCONTINUED | OUTPATIENT
Start: 2021-07-30 | End: 2021-07-30 | Stop reason: HOSPADM

## 2021-07-30 RX ORDER — KETOROLAC TROMETHAMINE 30 MG/ML
30 INJECTION, SOLUTION INTRAMUSCULAR; INTRAVENOUS EVERY 6 HOURS
Status: COMPLETED | OUTPATIENT
Start: 2021-07-30 | End: 2021-07-31

## 2021-07-30 RX ORDER — HYDROCORTISONE 2.5 %
CREAM (GRAM) TOPICAL 3 TIMES DAILY PRN
Status: DISCONTINUED | OUTPATIENT
Start: 2021-07-30 | End: 2021-07-31 | Stop reason: HOSPADM

## 2021-07-30 RX ORDER — ROPIVACAINE HYDROCHLORIDE 5 MG/ML
INJECTION, SOLUTION EPIDURAL; INFILTRATION; PERINEURAL PRN
Status: DISCONTINUED | OUTPATIENT
Start: 2021-07-30 | End: 2021-07-30

## 2021-07-30 RX ORDER — OXYTOCIN/0.9 % SODIUM CHLORIDE 30/500 ML
340 PLASTIC BAG, INJECTION (ML) INTRAVENOUS CONTINUOUS PRN
Status: DISCONTINUED | OUTPATIENT
Start: 2021-07-30 | End: 2021-07-30 | Stop reason: HOSPADM

## 2021-07-30 RX ORDER — BUPIVACAINE HYDROCHLORIDE 7.5 MG/ML
INJECTION, SOLUTION INTRASPINAL PRN
Status: DISCONTINUED | OUTPATIENT
Start: 2021-07-30 | End: 2021-07-30

## 2021-07-30 RX ORDER — METOCLOPRAMIDE 10 MG/1
10 TABLET ORAL EVERY 6 HOURS PRN
Status: DISCONTINUED | OUTPATIENT
Start: 2021-07-30 | End: 2021-07-31 | Stop reason: HOSPADM

## 2021-07-30 RX ORDER — ONDANSETRON 2 MG/ML
4 INJECTION INTRAMUSCULAR; INTRAVENOUS EVERY 30 MIN PRN
Status: DISCONTINUED | OUTPATIENT
Start: 2021-07-30 | End: 2021-07-30

## 2021-07-30 RX ORDER — LABETALOL 20 MG/4 ML (5 MG/ML) INTRAVENOUS SYRINGE
20 ONCE
Status: COMPLETED | OUTPATIENT
Start: 2021-07-30 | End: 2021-07-30

## 2021-07-30 RX ORDER — BISACODYL 10 MG
10 SUPPOSITORY, RECTAL RECTAL DAILY PRN
Status: DISCONTINUED | OUTPATIENT
Start: 2021-08-01 | End: 2021-07-31 | Stop reason: HOSPADM

## 2021-07-30 RX ORDER — KETAMINE HYDROCHLORIDE 10 MG/ML
INJECTION INTRAMUSCULAR; INTRAVENOUS PRN
Status: DISCONTINUED | OUTPATIENT
Start: 2021-07-30 | End: 2021-07-30

## 2021-07-30 RX ORDER — MISOPROSTOL 100 UG/1
TABLET ORAL PRN
Status: DISCONTINUED | OUTPATIENT
Start: 2021-07-30 | End: 2021-07-30

## 2021-07-30 RX ORDER — ACETAMINOPHEN 325 MG/1
975 TABLET ORAL EVERY 6 HOURS
Status: DISCONTINUED | OUTPATIENT
Start: 2021-07-30 | End: 2021-07-31 | Stop reason: HOSPADM

## 2021-07-30 RX ORDER — OXYTOCIN/0.9 % SODIUM CHLORIDE 30/500 ML
PLASTIC BAG, INJECTION (ML) INTRAVENOUS CONTINUOUS PRN
Status: DISCONTINUED | OUTPATIENT
Start: 2021-07-30 | End: 2021-07-30

## 2021-07-30 RX ORDER — KETOROLAC TROMETHAMINE 30 MG/ML
30 INJECTION, SOLUTION INTRAMUSCULAR; INTRAVENOUS ONCE
Status: COMPLETED | OUTPATIENT
Start: 2021-07-30 | End: 2021-07-30

## 2021-07-30 RX ORDER — FENTANYL CITRATE 50 UG/ML
INJECTION, SOLUTION INTRAMUSCULAR; INTRAVENOUS PRN
Status: DISCONTINUED | OUTPATIENT
Start: 2021-07-30 | End: 2021-07-30

## 2021-07-30 RX ORDER — CLINDAMYCIN PHOSPHATE 900 MG/50ML
900 INJECTION, SOLUTION INTRAVENOUS
Status: COMPLETED | OUTPATIENT
Start: 2021-07-30 | End: 2021-07-30

## 2021-07-30 RX ORDER — OXYTOCIN/0.9 % SODIUM CHLORIDE 30/500 ML
340 PLASTIC BAG, INJECTION (ML) INTRAVENOUS CONTINUOUS PRN
Status: DISCONTINUED | OUTPATIENT
Start: 2021-07-30 | End: 2021-07-31 | Stop reason: HOSPADM

## 2021-07-30 RX ORDER — ALBUTEROL SULFATE 0.83 MG/ML
2.5 SOLUTION RESPIRATORY (INHALATION) EVERY 4 HOURS PRN
Status: DISCONTINUED | OUTPATIENT
Start: 2021-07-30 | End: 2021-07-30 | Stop reason: HOSPADM

## 2021-07-30 RX ORDER — SODIUM CHLORIDE, SODIUM LACTATE, POTASSIUM CHLORIDE, CALCIUM CHLORIDE 600; 310; 30; 20 MG/100ML; MG/100ML; MG/100ML; MG/100ML
INJECTION, SOLUTION INTRAVENOUS CONTINUOUS
Status: DISCONTINUED | OUTPATIENT
Start: 2021-07-30 | End: 2021-07-30 | Stop reason: HOSPADM

## 2021-07-30 RX ORDER — ONDANSETRON 4 MG/1
4 TABLET, ORALLY DISINTEGRATING ORAL EVERY 6 HOURS PRN
Status: DISCONTINUED | OUTPATIENT
Start: 2021-07-30 | End: 2021-07-31 | Stop reason: HOSPADM

## 2021-07-30 RX ORDER — DEXAMETHASONE SODIUM PHOSPHATE 10 MG/ML
INJECTION, SOLUTION INTRAMUSCULAR; INTRAVENOUS PRN
Status: DISCONTINUED | OUTPATIENT
Start: 2021-07-30 | End: 2021-07-30

## 2021-07-30 RX ORDER — MISOPROSTOL 200 UG/1
400 TABLET ORAL
Status: DISCONTINUED | OUTPATIENT
Start: 2021-07-30 | End: 2021-07-31 | Stop reason: HOSPADM

## 2021-07-30 RX ORDER — OXYTOCIN 10 [USP'U]/ML
10 INJECTION, SOLUTION INTRAMUSCULAR; INTRAVENOUS
Status: DISCONTINUED | OUTPATIENT
Start: 2021-07-30 | End: 2021-07-30 | Stop reason: HOSPADM

## 2021-07-30 RX ORDER — EPHEDRINE SULFATE 50 MG/ML
INJECTION, SOLUTION INTRAVENOUS PRN
Status: DISCONTINUED | OUTPATIENT
Start: 2021-07-30 | End: 2021-07-30

## 2021-07-30 RX ORDER — LABETALOL 20 MG/4 ML (5 MG/ML) INTRAVENOUS SYRINGE
20-80 EVERY 10 MIN PRN
Status: DISCONTINUED | OUTPATIENT
Start: 2021-07-30 | End: 2021-07-31 | Stop reason: HOSPADM

## 2021-07-30 RX ORDER — FENTANYL CITRATE 50 UG/ML
25 INJECTION, SOLUTION INTRAMUSCULAR; INTRAVENOUS EVERY 5 MIN PRN
Status: DISCONTINUED | OUTPATIENT
Start: 2021-07-30 | End: 2021-07-30 | Stop reason: HOSPADM

## 2021-07-30 RX ORDER — NALOXONE HYDROCHLORIDE 0.4 MG/ML
0.4 INJECTION, SOLUTION INTRAMUSCULAR; INTRAVENOUS; SUBCUTANEOUS
Status: DISCONTINUED | OUTPATIENT
Start: 2021-07-30 | End: 2021-07-30

## 2021-07-30 RX ORDER — CARBOPROST TROMETHAMINE 250 UG/ML
250 INJECTION, SOLUTION INTRAMUSCULAR
Status: DISCONTINUED | OUTPATIENT
Start: 2021-07-30 | End: 2021-07-30 | Stop reason: HOSPADM

## 2021-07-30 RX ORDER — TRANEXAMIC ACID 10 MG/ML
1 INJECTION, SOLUTION INTRAVENOUS EVERY 30 MIN PRN
Status: DISCONTINUED | OUTPATIENT
Start: 2021-07-30 | End: 2021-07-31 | Stop reason: HOSPADM

## 2021-07-30 RX ORDER — PHENYLEPHRINE HYDROCHLORIDE 10 MG/ML
INJECTION INTRAVENOUS PRN
Status: DISCONTINUED | OUTPATIENT
Start: 2021-07-30 | End: 2021-07-30

## 2021-07-30 RX ORDER — OXYCODONE HYDROCHLORIDE 5 MG/1
5-10 TABLET ORAL EVERY 4 HOURS PRN
Status: DISCONTINUED | OUTPATIENT
Start: 2021-07-30 | End: 2021-07-31 | Stop reason: HOSPADM

## 2021-07-30 RX ORDER — SODIUM CHLORIDE, SODIUM LACTATE, POTASSIUM CHLORIDE, CALCIUM CHLORIDE 600; 310; 30; 20 MG/100ML; MG/100ML; MG/100ML; MG/100ML
INJECTION, SOLUTION INTRAVENOUS CONTINUOUS
Status: DISCONTINUED | OUTPATIENT
Start: 2021-07-30 | End: 2021-07-30

## 2021-07-30 RX ORDER — PROCHLORPERAZINE 25 MG
25 SUPPOSITORY, RECTAL RECTAL EVERY 12 HOURS PRN
Status: DISCONTINUED | OUTPATIENT
Start: 2021-07-30 | End: 2021-07-31 | Stop reason: HOSPADM

## 2021-07-30 RX ORDER — TRANEXAMIC ACID 10 MG/ML
1 INJECTION, SOLUTION INTRAVENOUS ONCE
Status: COMPLETED | OUTPATIENT
Start: 2021-07-30 | End: 2021-07-30

## 2021-07-30 RX ORDER — CITRIC ACID/SODIUM CITRATE 334-500MG
30 SOLUTION, ORAL ORAL
Status: COMPLETED | OUTPATIENT
Start: 2021-07-30 | End: 2021-07-30

## 2021-07-30 RX ORDER — METHYLERGONOVINE MALEATE 0.2 MG/ML
200 INJECTION INTRAVENOUS
Status: DISCONTINUED | OUTPATIENT
Start: 2021-07-30 | End: 2021-07-31 | Stop reason: HOSPADM

## 2021-07-30 RX ORDER — MAGNESIUM SULFATE HEPTAHYDRATE 40 MG/ML
2 INJECTION, SOLUTION INTRAVENOUS
Status: DISCONTINUED | OUTPATIENT
Start: 2021-07-30 | End: 2021-07-31 | Stop reason: HOSPADM

## 2021-07-30 RX ORDER — DIPHENHYDRAMINE HYDROCHLORIDE 50 MG/ML
25 INJECTION INTRAMUSCULAR; INTRAVENOUS EVERY 6 HOURS PRN
Status: DISCONTINUED | OUTPATIENT
Start: 2021-07-30 | End: 2021-07-31 | Stop reason: HOSPADM

## 2021-07-30 RX ORDER — LIDOCAINE 40 MG/G
CREAM TOPICAL
Status: DISCONTINUED | OUTPATIENT
Start: 2021-07-30 | End: 2021-07-30 | Stop reason: HOSPADM

## 2021-07-30 RX ORDER — NALOXONE HYDROCHLORIDE 0.4 MG/ML
0.2 INJECTION, SOLUTION INTRAMUSCULAR; INTRAVENOUS; SUBCUTANEOUS
Status: DISCONTINUED | OUTPATIENT
Start: 2021-07-30 | End: 2021-07-30

## 2021-07-30 RX ORDER — PROCHLORPERAZINE MALEATE 10 MG
10 TABLET ORAL EVERY 6 HOURS PRN
Status: DISCONTINUED | OUTPATIENT
Start: 2021-07-30 | End: 2021-07-31 | Stop reason: HOSPADM

## 2021-07-30 RX ORDER — OXYTOCIN/0.9 % SODIUM CHLORIDE 30/500 ML
100-340 PLASTIC BAG, INJECTION (ML) INTRAVENOUS CONTINUOUS PRN
Status: DISCONTINUED | OUTPATIENT
Start: 2021-07-30 | End: 2021-07-30

## 2021-07-30 RX ORDER — MISOPROSTOL 200 UG/1
400 TABLET ORAL
Status: DISCONTINUED | OUTPATIENT
Start: 2021-07-30 | End: 2021-07-30 | Stop reason: HOSPADM

## 2021-07-30 RX ORDER — DEXTROSE, SODIUM CHLORIDE, SODIUM LACTATE, POTASSIUM CHLORIDE, AND CALCIUM CHLORIDE 5; .6; .31; .03; .02 G/100ML; G/100ML; G/100ML; G/100ML; G/100ML
INJECTION, SOLUTION INTRAVENOUS CONTINUOUS
Status: DISCONTINUED | OUTPATIENT
Start: 2021-07-30 | End: 2021-07-31 | Stop reason: HOSPADM

## 2021-07-30 RX ORDER — METOCLOPRAMIDE HYDROCHLORIDE 5 MG/ML
10 INJECTION INTRAMUSCULAR; INTRAVENOUS EVERY 6 HOURS PRN
Status: DISCONTINUED | OUTPATIENT
Start: 2021-07-30 | End: 2021-07-31 | Stop reason: HOSPADM

## 2021-07-30 RX ORDER — IBUPROFEN 800 MG/1
800 TABLET, FILM COATED ORAL EVERY 6 HOURS
Status: DISCONTINUED | OUTPATIENT
Start: 2021-07-31 | End: 2021-07-31 | Stop reason: HOSPADM

## 2021-07-30 RX ORDER — SIMETHICONE 80 MG
80 TABLET,CHEWABLE ORAL 4 TIMES DAILY PRN
Status: DISCONTINUED | OUTPATIENT
Start: 2021-07-30 | End: 2021-07-31 | Stop reason: HOSPADM

## 2021-07-30 RX ORDER — HYDROMORPHONE HYDROCHLORIDE 1 MG/ML
0.2 INJECTION, SOLUTION INTRAMUSCULAR; INTRAVENOUS; SUBCUTANEOUS EVERY 5 MIN PRN
Status: DISCONTINUED | OUTPATIENT
Start: 2021-07-30 | End: 2021-07-30 | Stop reason: HOSPADM

## 2021-07-30 RX ORDER — HYDRALAZINE HYDROCHLORIDE 20 MG/ML
10 INJECTION INTRAMUSCULAR; INTRAVENOUS
Status: DISCONTINUED | OUTPATIENT
Start: 2021-07-30 | End: 2021-07-31 | Stop reason: HOSPADM

## 2021-07-30 RX ORDER — MODIFIED LANOLIN
OINTMENT (GRAM) TOPICAL
Status: DISCONTINUED | OUTPATIENT
Start: 2021-07-30 | End: 2021-07-31 | Stop reason: HOSPADM

## 2021-07-30 RX ORDER — CARBOPROST TROMETHAMINE 250 UG/ML
250 INJECTION, SOLUTION INTRAMUSCULAR
Status: DISCONTINUED | OUTPATIENT
Start: 2021-07-30 | End: 2021-07-31 | Stop reason: HOSPADM

## 2021-07-30 RX ORDER — LIDOCAINE 40 MG/G
CREAM TOPICAL
Status: DISCONTINUED | OUTPATIENT
Start: 2021-07-30 | End: 2021-07-31 | Stop reason: HOSPADM

## 2021-07-30 RX ORDER — LORAZEPAM 2 MG/ML
2 INJECTION INTRAMUSCULAR
Status: DISCONTINUED | OUTPATIENT
Start: 2021-07-30 | End: 2021-07-31 | Stop reason: HOSPADM

## 2021-07-30 RX ADMIN — SERTRALINE HYDROCHLORIDE 100 MG: 50 TABLET ORAL at 19:51

## 2021-07-30 RX ADMIN — PRENATAL VITAMINS-IRON FUMARATE 27 MG IRON-FOLIC ACID 0.8 MG TABLET 1 TABLET: at 19:51

## 2021-07-30 RX ADMIN — ACETAMINOPHEN 975 MG: 325 TABLET, FILM COATED ORAL at 13:52

## 2021-07-30 RX ADMIN — SODIUM CHLORIDE, POTASSIUM CHLORIDE, SODIUM LACTATE AND CALCIUM CHLORIDE: 600; 310; 30; 20 INJECTION, SOLUTION INTRAVENOUS at 07:30

## 2021-07-30 RX ADMIN — BUPIVACAINE HYDROCHLORIDE IN DEXTROSE 1.6 ML: 7.5 INJECTION, SOLUTION SUBARACHNOID at 08:23

## 2021-07-30 RX ADMIN — PHENYLEPHRINE HYDROCHLORIDE 150 MCG: 10 INJECTION INTRAVENOUS at 08:59

## 2021-07-30 RX ADMIN — ACETAMINOPHEN 975 MG: 325 TABLET, FILM COATED ORAL at 18:48

## 2021-07-30 RX ADMIN — EPHEDRINE SULFATE 15 MG: 50 INJECTION, SOLUTION INTRAVENOUS at 08:30

## 2021-07-30 RX ADMIN — SODIUM CHLORIDE, SODIUM LACTATE, POTASSIUM CHLORIDE, CALCIUM CHLORIDE AND DEXTROSE MONOHYDRATE: 5; 600; 310; 30; 20 INJECTION, SOLUTION INTRAVENOUS at 12:38

## 2021-07-30 RX ADMIN — PHENYLEPHRINE HYDROCHLORIDE 100 MCG: 10 INJECTION INTRAVENOUS at 08:35

## 2021-07-30 RX ADMIN — BUPIVACAINE 10 MG: 13.3 INJECTION, SUSPENSION, LIPOSOMAL INFILTRATION at 09:19

## 2021-07-30 RX ADMIN — TRANEXAMIC ACID 1 G: 10 INJECTION, SOLUTION INTRAVENOUS at 08:45

## 2021-07-30 RX ADMIN — KETOROLAC TROMETHAMINE 30 MG: 30 INJECTION, SOLUTION INTRAMUSCULAR; INTRAVENOUS at 10:03

## 2021-07-30 RX ADMIN — FENTANYL CITRATE 50 MCG: 50 INJECTION, SOLUTION INTRAMUSCULAR; INTRAVENOUS at 08:43

## 2021-07-30 RX ADMIN — ONDANSETRON 4 MG: 2 INJECTION INTRAMUSCULAR; INTRAVENOUS at 08:15

## 2021-07-30 RX ADMIN — PHENYLEPHRINE HYDROCHLORIDE 150 MCG: 10 INJECTION INTRAVENOUS at 08:27

## 2021-07-30 RX ADMIN — ROPIVACAINE HYDROCHLORIDE 15 ML: 5 INJECTION, SOLUTION EPIDURAL; INFILTRATION; PERINEURAL at 09:19

## 2021-07-30 RX ADMIN — Medication 10 MG: at 08:58

## 2021-07-30 RX ADMIN — EPHEDRINE SULFATE 15 MG: 50 INJECTION, SOLUTION INTRAVENOUS at 08:27

## 2021-07-30 RX ADMIN — PHENYLEPHRINE HYDROCHLORIDE 200 MCG: 10 INJECTION INTRAVENOUS at 08:30

## 2021-07-30 RX ADMIN — HYDROMORPHONE HYDROCHLORIDE 0.1 MG: 1 INJECTION, SOLUTION INTRAMUSCULAR; INTRAVENOUS; SUBCUTANEOUS at 08:23

## 2021-07-30 RX ADMIN — GENTAMICIN SULFATE 120 MG: 40 INJECTION, SOLUTION INTRAMUSCULAR; INTRAVENOUS at 08:12

## 2021-07-30 RX ADMIN — BUPIVACAINE 10 MG: 13.3 INJECTION, SUSPENSION, LIPOSOMAL INFILTRATION at 09:23

## 2021-07-30 RX ADMIN — DEXAMETHASONE SODIUM PHOSPHATE 10 MG: 10 INJECTION, SOLUTION INTRAMUSCULAR; INTRAVENOUS at 08:30

## 2021-07-30 RX ADMIN — OXYTOCIN-SODIUM CHLORIDE 0.9% IV SOLN 30 UNIT/500ML 200 ML: 30-0.9/5 SOLUTION at 09:24

## 2021-07-30 RX ADMIN — Medication 340 ML/HR: at 11:02

## 2021-07-30 RX ADMIN — DOCUSATE SODIUM AND SENNOSIDES 1 TABLET: 8.6; 5 TABLET, FILM COATED ORAL at 19:51

## 2021-07-30 RX ADMIN — KETOROLAC TROMETHAMINE 30 MG: 30 INJECTION, SOLUTION INTRAMUSCULAR; INTRAVENOUS at 22:10

## 2021-07-30 RX ADMIN — OXYTOCIN-SODIUM CHLORIDE 0.9% IV SOLN 30 UNIT/500ML 300 ML/HR: 30-0.9/5 SOLUTION at 08:45

## 2021-07-30 RX ADMIN — ACETAMINOPHEN 975 MG: 325 TABLET, FILM COATED ORAL at 06:15

## 2021-07-30 RX ADMIN — CLINDAMYCIN PHOSPHATE 900 MG: 900 INJECTION, SOLUTION INTRAVENOUS at 07:41

## 2021-07-30 RX ADMIN — LABETALOL HYDROCHLORIDE 20 MG: 5 INJECTION, SOLUTION INTRAVENOUS at 07:53

## 2021-07-30 RX ADMIN — ROPIVACAINE HYDROCHLORIDE 15 ML: 5 INJECTION, SOLUTION EPIDURAL; INFILTRATION; PERINEURAL at 09:23

## 2021-07-30 RX ADMIN — EPHEDRINE SULFATE 10 MG: 50 INJECTION, SOLUTION INTRAVENOUS at 08:38

## 2021-07-30 RX ADMIN — MISOPROSTOL 800 MCG: 100 TABLET ORAL at 09:15

## 2021-07-30 RX ADMIN — EPHEDRINE SULFATE 10 MG: 50 INJECTION, SOLUTION INTRAVENOUS at 08:35

## 2021-07-30 RX ADMIN — KETOROLAC TROMETHAMINE 30 MG: 30 INJECTION, SOLUTION INTRAMUSCULAR; INTRAVENOUS at 16:13

## 2021-07-30 RX ADMIN — SODIUM CITRATE AND CITRIC ACID MONOHYDRATE 30 ML: 500; 334 SOLUTION ORAL at 06:15

## 2021-07-30 ASSESSMENT — ACTIVITIES OF DAILY LIVING (ADL)
WALKING_OR_CLIMBING_STAIRS_DIFFICULTY: NO
PATIENT_/_FAMILY_COMMUNICATION_STYLE: SPOKEN LANGUAGE (ENGLISH OR BILINGUAL)
TOILETING_ISSUES: NO
DRESSING/BATHING_DIFFICULTY: NO
DIFFICULTY_EATING/SWALLOWING: NO
WEAR_GLASSES_OR_BLIND: NO
DOING_ERRANDS_INDEPENDENTLY_DIFFICULTY: NO
HEARING_DIFFICULTY_OR_DEAF: NO
FALL_HISTORY_WITHIN_LAST_SIX_MONTHS: NO
DIFFICULTY_COMMUNICATING: NO
CONCENTRATING,_REMEMBERING_OR_MAKING_DECISIONS_DIFFICULTY: NO

## 2021-07-30 ASSESSMENT — MIFFLIN-ST. JEOR
SCORE: 1471.07
SCORE: 1471.07

## 2021-07-30 NOTE — PLAN OF CARE
Scheduled AM C/S Admit Note  Rachel Carvajal  MRN: [id number]  Gestational Age: 37w0d      Rachel Carvajal presents for scheduled  section for PIH and baby presentation.  Patient denies contractions, bleeding or LOF.  NPO [id number].    Past Medical History:   Diagnosis Date     Acute appendicitis with localized peritonitis 2018     Dichorionic diamniotic twin pregnancy 2021    AMA  DNA negative  Hx of genital HSV  Initiate suppression therapy @ 32 w Gestational HTN Depression Elective sterilization TWINS  Di/Di  Girl/Boy Failed 1 hr GTT.  Passed 3 hr GTT  WEEKLY  US for BPP with Doppler.  Also weekly NST.  ECHO normal for both fetus 1 and 2 NOTE:  Normal growth for Twin A (1) on 6/16/21 7/15/21:  Dopplers normal for both  EFW:  Twin A 5th %tile  Twin B 46th%tile Rh posi     Elderly multigravida with antepartum condition or complication 2021     Gestational hypertension, third trimester 2021     Glucose intolerance of pregnancy 2021    Failed screen, Normal 3 hour GTT     Herpes simplex infection of genitourinary system 2017     Mild episode of recurrent major depressive disorder (H) 2016     Past Surgical History:   Procedure Laterality Date     ENT SURGERY      TONSILECTOMY     GYN SURGERY      invetro fertilization     LAPAROSCOPIC APPENDECTOMY  06/10/2018    [facility]           FHT: Baby A 160           Baby B 154  NST: Both babies Appropriate for Gestational Age.    Plan:  - section at .    Patient ambulated to bed via self.. Patient is alert and oriented X 3, denies any pain. pain. Patient oriented to room, unit, hourly rounding, and plan of care. Call light within reach.  Explained admission packet with patient bill of rights brochure. Will continue to monitor and document as needed.     Inpatient nursing criteria listed below was met:    Health care directives status obtained and documented: Yes  Patient identifies a surrogate  decision maker: Yes   If yes, who:bill Contact Information:see flowsheet  Core Measure diagnosis present:: No  Vaccine assessment done and vaccines ordered if appropriate. Yes  Clergy visit ordered if patient requests: No  Skin issues/needs documented:Yes  Isolation needs addressed, if appropriate: Yes  Fall Prevention (Med and High risk): Care plan updated, Education given and documented and signage used: Yes  Care Plan initiated: Yes  Education Documented (Reminder to educate patient if MRSA is present on admission): Yes  Education Assessment documented:Yes  Patient has discharge needs (If yes, please explain): Yes

## 2021-07-30 NOTE — L&D DELIVERY NOTE
Delivery Summary    Rachel Carvajal MRN# [id number]   Age: 35 year old YOB: 1985     ASSESSMENT & PLAN: Primary LTCS Twins.  See operative note.        Mayela CarvajalA Rachel [[id number]]    Delivery/Placenta Date and Time    Delivery Date: 7/30/21 Delivery Time:  8:43 AM           Apgars     1 Minute 5 Minute 10 Minute 15 Minute 20 Minute   Skin color: 1  1  2      Heart rate: 2  2  2      Reflex irritability: 1  1  2      Muscle tone: 1  1  1      Respiratory effort: 1  1  1      Total: 6  6  8      Apgars assigned by: LESLIE BELL RN,DR. URRUTIA     Delivery (Maternal) (Provider to Complete) ([id number])       Blood Loss  Mother: Rachel Carvajal #[id number]   Start of Mother's Information    Delivery Blood Loss  07/30/21 0838 - 07/30/21 0948    EBL (mL) Anesthesia 800 mL    Total  800 mL         End of Mother's Information  Mother: Rachel Carvajal #[id number]            Briana Carvajal-MARCO Ramos [[id number]]    Delivery/Placenta Date and Time    Delivery Date: 7/30/21 Delivery Time:  8:44 AM           Apgars     1 Minute 5 Minute 10 Minute 15 Minute 20 Minute   Skin color: 0  1  2  2  2    Heart rate: 2  1  2  2  2    Reflex irritability: 1  1  1  1  1    Muscle tone: 0  0  0  0  1    Respiratory effort: 2  2  2  2  2    Total: 5  5  7  7  8    Apgars assigned by: NAVA     Delivery (Maternal) (Provider to Complete) ([id number])       Blood Loss  Mother: Rachel Carvajal #[id number]   Start of Mother's Information    Delivery Blood Loss  07/30/21 0838 - 07/30/21 0948    EBL (mL) Anesthesia 800 mL    Total  800 mL         End of Mother's Information  Mother: Rachel Carvajal #[id number]                 James Lenz MD

## 2021-07-30 NOTE — ANESTHESIA PROCEDURE NOTES
Intrathecal injection Procedure Note  Pre-Procedure   Staff -        CRNA: Wilver Barraza APRN CRNA       Performed By: CRNA       Location: OR       Procedure Start/Stop Times: 7/30/2021 8:22 AM and 7/30/2021 8:23 AM       Pre-Anesthestic Checklist: patient identified, IV checked, risks and benefits discussed, informed consent, monitors and equipment checked, pre-op evaluation, at physician/surgeon's request and post-op pain management  Timeout:       Correct Patient: Yes        Correct Procedure: Yes        Correct Site: Yes        Correct Position: Yes   Procedure Documentation  Procedure: intrathecal injection       Diagnosis: c section       Patient Position: sitting       Skin prep: Betadine       Insertion Site: L3-4. (midline approach).       Needle Gauge: 25.        Needle Length (Inches): 3.5        Spinal Needle Type: Cabrera tip       Introducer used       Introducer: 20 G       # of attempts: 1 and  # of redirects:  1    Assessment/Narrative         Paresthesias: No.       CSF fluid: clear.    Comments:  No apparent complications.

## 2021-07-30 NOTE — PLAN OF CARE
Pt declines needs. VSS. Denies pain. Fundus firm 2 under umbilicus. Pt assisted to edge of bed with staff assist of 2. Transfer belt used and pt assisted to stand. Pt denies dizziness or lightheadedness upon standing. Proceeded to ambulate to the bathroom with staff assist of 2. Alisha cares and pad changed. Pt assisted in room and requested to sit on the bench. SCD's and o2 sat monitor off while pt is sitting up. Will reconnect when pt returns to bed.

## 2021-07-30 NOTE — OP NOTE
Section Operative Note  [facility]    Pre-operative diagnosis: Intrauterine pregnancy at 37 weeks gestation  Malpresentation  Multiple gestation (dichorionic diamniotic twins)  Preeclampsia  Desires sterilization   Post-operative diagnosis: Same, viable male/female infants.    Procedure: Primary low transverse  section  Bilateral salpingectomy   Surgeon: James Lenz MD   Assistant(s): Dr. Nitin Avina (assistance required for retraction, exposure, instrument handling, and wound closure)     Anesthesia: Spinal anesthesia with dilaudid and Tap block   Estimated blood loss: 750ml   Total IV fluids: (See anesthesia record)   Blood transfusion: No transfusion was given during surgery   Total urine output: (See anesthesia record)   Drains: Butler catheter   Specimens: Placenta, fallopian tubes.    Findings: Vigorous  female (twin A footling breech).  Apgar's 6/6/8,Vigorous  male (twin B transverse)  Apgar's 5/5/7.   Intact placenta with 3VC, nml pelvic anatomy   Complications: None   Condition: Mother stable, transfered to post-anesthesia recovery     Procedure Details:  The risks, benefits, complications, treatment options, and expected outcomes were discussed with the patient.  The patient concurred with the proposed plan, giving informed consent.  The patient was taken to Operating Room,  identity confirmed and the procedure verified as  delivery and salpingectomy.  A Time Out was held and the above information confirmed.    After uneventful anesthesia placement the patient was prepped and draped in the left lateral tilt  position and a butler catheter placed.   A low-transverse skin incision was made with a scalpel and carried down through the subcutaneous tissue to the fascia which was extended transversely. The fascia was  from the underlying rectus tissue superiorly and inferiorly. The peritoneum was identified and entered without difficulty. The  utero-vesical peritoneal reflection was incised transversely and the bladder flap was bluntly freed from the lower uterine segment. A low transverse uterine incision was made.  The preenting  was delivered from the breech presentation with the usual mauevers.  After the umbilical cord was clamped and cut the infant was suctioned and handed to the awaiting resuscitation team.  The second twin spontaneously converted to vtx and was delivered and handed to the awaiting resuscitation team after cord clamp.    Cord blood was obtained for evaluation. The placenta was removed intact and the uterus swept free of membranes and debris. The uterine incision was closed with running locked sutures of 1.0 Chromic in a 2 layer fashion. Bilateral salpingectomy was performed by transecting the tubes at the uterine cornua and mesosalpinx with the Ligasure cutting cautery device.   Hemostasis was excellent. Irrigation with warm normal saline was carried out until clear. The rectus muscles were re approximated with running 0 Vicryl.   The fascia was then reapproximated with running sutures of 0 PDS. The subcutaneous space was irrigated and checked for hemostasis.  The skin was reapproximated with running 3.0 Monocryl.  Surgical glue was applied.   A vaginal exam was performed at the end of the procedure to evacuate the lower uterine segment and vagina of clots.  There were no complications and the patient was transferred to the recovery room in excellent and stable condition.    Instrument, sponge, and needle counts were correct prior the abdominal closure and at the conclusion of the case.         James Lenz MD

## 2021-07-30 NOTE — ANESTHESIA CARE TRANSFER NOTE
Patient: Rachel Carvajal    Procedure(s):   SECTION, WITH BILATERAL SALPINGECTOMY    Diagnosis: Dichorionic diamniotic twin pregnancy [O30.049]  Encounter for sterilization [Z30.2]  Diagnosis Additional Information: No value filed.    Anesthesia Type:   Spinal, Peripheral Nerve Block     Note:    Oropharynx: oropharynx clear of all foreign objects  Level of Consciousness: awake  Oxygen Supplementation: room air    Independent Airway: airway patency satisfactory and stable  Dentition: dentition unchanged  Vital Signs Stable: post-procedure vital signs reviewed and stable  Report to RN Given: handoff report given  Patient transferred to: PACU    Handoff Report: Identifed the Patient, Identified the Reponsible Provider, Reviewed the pertinent medical history, Discussed the surgical course, Reviewed Intra-OP anesthesia mangement and issues during anesthesia, Set expectations for post-procedure period and Allowed opportunity for questions and acknowledgement of understanding      Vitals:  Vitals Value Taken Time   /73 21 0934   Temp     Pulse 77 21 0935   Resp 30 21 0935   SpO2 100 % 21 0935   Vitals shown include unvalidated device data.    Electronically Signed By: RAVINDRA Muñoz CRNA  2021  9:36 AM

## 2021-07-30 NOTE — OR NURSING
/102  Pulse 123  Denies headache blurred vision and epigastric pain,  Labatolol 20 mg given per Dr Avina.  Reflexes in arms normal.  One beat.

## 2021-07-30 NOTE — PLAN OF CARE
Assessments as charted. B/P: 131/86, T: 97.7, P: 80, R: 18. Rates pain: 0/10 . Incision: Healing well, well approximated, without signs of infection, and no drainage. Voiding without difficulty. Fundus firm. Lochia: Light. Activity: moving with difficulty due to post op surgical pain. Infant feeding: Formula.     Postpartum care education provided, see Patient Education activity. Patient denies needs. Will monitor.  Martha Armendariz RN

## 2021-07-30 NOTE — OR NURSING
Pt meets PACU discharge criteria at 10:15. Pt transferred to [facility]. Writer maintained care for pt until 12:15. Fundus remains at 2 fingers below umbilicus, small amount of lochia. Dr. Lenz and Dr. Avina notified about hypertension. No new orders, will continue to monitor pt. Bedside nurse to nurse handoff given and all questions answered.

## 2021-07-30 NOTE — ANESTHESIA PROCEDURE NOTES
TAP Procedure Note  Pre-Procedure   Staff -        CRNA: Wilver Barraza APRN CRNA       Performed By: CRNA       Location: OR       Procedure Start/Stop Times: 7/30/2021 9:15 AM and 7/30/2021 9:23 AM       Pre-Anesthestic Checklist: patient identified, IV checked, site marked, risks and benefits discussed, informed consent, monitors and equipment checked, pre-op evaluation, at physician/surgeon's request and post-op pain management  Timeout:       Correct Patient: Yes        Correct Procedure: Yes        Correct Site: Yes        Correct Position: Yes        Correct Laterality: Yes        Site Marked: Yes  Procedure Documentation  Procedure: TAP       Diagnosis: C SECTION       Laterality: bilateral       Patient Position: supine       Skin prep: Chloraprep       Needle Type: short bevel       Needle Gauge: 20.        Needle Length (Inches): 4        Ultrasound guided       1. Ultrasound was used to identify targeted nerve, plexus, vascular marker, or fascial plane and place a needle adjacent to it in real-time.       2. Ultrasound was used to visualize the spread of anesthetic in close proximity to the above referenced structure.       3. A permanent image is entered into the patient's record.       4. The visualized anatomic structures appeared normal.       5. There were no apparent abnormal pathologic findings.    Assessment/Narrative         The placement was negative for: blood aspirated, painful injection and site bleeding       Paresthesias: No.     Bolus given via needle..        Secured via.        Insertion/Infusion Method: Single Shot       Complications: none       Injection made incrementally with aspirations every 5 mL.    Comments:  No apparent complications.

## 2021-07-30 NOTE — ANESTHESIA POSTPROCEDURE EVALUATION
Patient: Rachel Carvajal    Procedure(s):   SECTION, WITH BILATERAL SALPINGECTOMY    Diagnosis:Dichorionic diamniotic twin pregnancy [O30.049]  Encounter for sterilization [Z30.2]  Diagnosis Additional Information: No value filed.    Anesthesia Type:  Spinal, Peripheral Nerve Block    Note:  Disposition: Outpatient   Postop Pain Control: Uneventful            Sign Out: Well controlled pain   PONV: No   Neuro/Psych: Uneventful            Sign Out: Acceptable/Baseline neuro status   Airway/Respiratory: Uneventful            Sign Out: Acceptable/Baseline resp. status   CV/Hemodynamics: Uneventful            Sign Out: Acceptable CV status; No obvious hypovolemia; No obvious fluid overload   Other NRE: NONE   DID A NON-ROUTINE EVENT OCCUR? No           Last vitals:  Vitals Value Taken Time   /85 21 1015   Temp 97.1  F (36.2  C) 21 1015   Pulse 76 21 1015   Resp 13 21 1016   SpO2 96 % 21 1015   Vitals shown include unvalidated device data.    Electronically Signed By: RAVINDRA Jansen CRNA  2021  11:37 AM

## 2021-07-30 NOTE — PROGRESS NOTES
"Name: Rachel Carvajal  Age: 35 year old  YOB: 1985   Medical Record #: [id number]     Postpartum Day 0:     DATE: 2021  SUBJECTIVE:  Patient is doing well. Her pain is controlled with current medications. She reports normal lochia. She is tolerating a regular diet without nausea. She is not ambulating. She is not passing flatus. The patient denies depression symptoms on medication. She denies headache or vision changes. No SOB, RUQ pain, CP, or significant edema. She denies fever or chills. The babies were transferred to NICU in [place] but are doing well now per patient. She is bottle feeding.    OBJECTIVE:  /86   Pulse 80   Temp 97.7  F (36.5  C) (Oral)   Resp 18   Ht 1.6 m (5' 3\")   Wt 81.2 kg (179 lb)   LMP 2020   SpO2 97%   Breastfeeding Unknown   BMI 31.71 kg/m      I/O last 3 completed shifts:  In: 800 [I.V.:750; IV Piggyback:50]  Out: 955 [Urine:155; Blood:800]    Well developed and well nourished female in no apparent distress. Alert and oriented. Lungs are clear to auscultation bilaterally. Heart is regular rate and rhythm. Abdomen is nondistended with positive bowel sounds. Uterine fundus is firm and palpated below the umbilicus. Incision is bandaged, clean and dry. Perineum with normal lochia. Extremities with sequential compression device without edema or tenderness. DTR 2+ bilaterally, no clonus.    LABS :  Lab Results   Component Value Date    ABO A 2021    RH Pos 2021    AS Negative 2021    HEPBANG Nonreactive 2021    TREPT Nonreactive 2021    RUQIGG 13 2021    HGB 12.6 2021    HCT 36.0 2021     2021    GBS Negative 11/10/2017    PAP NIL 2021    CHPCRT  2017     Negative   Negative for C. trachomatis rRNA by transcription mediated amplification.   A negative result by transcription mediated amplification does not preclude the   presence of C. trachomatis infection because " results are dependent on proper   and adequate collection, absence of inhibitors, and sufficient rRNA to be   detected.      GCPCRT  2017     Negative   Negative for N. gonorrhoeae rRNA by transcription mediated amplification.   A negative result by transcription mediated amplification does not preclude the   presence of N. gonorrhoeae infection because results are dependent on proper   and adequate collection, absence of inhibitors, and sufficient rRNA to be   detected.       SARS CoV2 PCR   Date Value Ref Range Status   2021 Negative Negative Final     Comment:     NEGATIVE: SARS-CoV-2 (COVID-19) RNA not detected, presumed negative.     Pre-eclampsia labs  Recent Labs   Lab Test 21  0557 21  1545 21  1542 21  1429 21  1558 17  1853   HGB 12.6  --  12.6 11.0* 12.2 10.9*   HCT 36.0  --   --  31.5* 34.8* 32.8*     --  318 284 251 370   AST 36  36 31  --   --   --   --    ALT 52*  52* 44  --   --   --   --    CR 0.58  --   --   --   --   --    URIC 5.6  --   --   --   --   --      2021 Urine protein/ creatine ratio: 0.33  2021 Treponema: pending  2021 Hepatitis C: pending    IMPRESSION :  1. 35 year old  female diamniotic, dichorionic twin pregnancy at 37w0d, now delivered.  2. POD# 0 s/p Primary  section with bilateral salpingectomy, doing well.  3. Preeclampsia  4. Depression  5. History of genital herpes on antiviral prophylaxis  6. Bottlefeeding    PLAN:  1. I discussed the procedure and the surgical findings with Rachel Carvajal.  2. The patient was diagnosed with preeclampsia on admission this morning.  She had gestational hypertension with negative labs and mild range blood pressures without any symptoms of severe disease or proteinuria.  On arrival to the hospital for her  section she had severe range blood pressures and repeat labs showed proteinuria with a urine protein creatinine ratio 0.33.  She remained  asymptomatic.  Her blood pressures on admission were 147/100, 155/98, 144/105 and she was given labetalol 20 mg IV x1 which improved her blood pressures.  Postoperatively her blood pressures have remained in the normal range except she did have some mild range hypertension BP which I believe was associated with the transfer of her twin infants to the NICU.  Since the transfer of her infants, her BPs have returned to normal range.  She remains asymptomatic.  The main treatment for preeclampsia is delivery of the infants as well as the placentas.  I will continue to watch the patient closely and monitor her blood pressure.  If she develops severe range blood pressure postoperatively then I will start magnesium per protocol and treat severe range blood pressure per protocol.  3. The patient would like to be discharged as soon as possible so that she can be reunited with her infants in the NICU.  If the patient remained stable overnight and is able to perform activities of daily living tomorrow then I anticipate discharge.  4. Continue with Zoloft Rx.  5. Continue routine post-operative care.  6. Continue clear liquid diet and advance diet as tolerated.  7. Continue butler catheter and discontinue per protocol.  8. Continue bilateral sequential compression device while in bed.    9. Encourage / Discuss nursing. The patient plans to bottle feed.  10. I discussed some routine post-operative recommendations, precautions and instructions with the patient. I discussed incision and wound care recommendations and bathing recommendations.  11. Repeat Serology / Treponema Pallidum Antibody Test obtained per [facility] protocol.    Nitin Avina MD  Obstetrics and Gynecology

## 2021-07-31 VITALS
HEART RATE: 88 BPM | DIASTOLIC BLOOD PRESSURE: 88 MMHG | RESPIRATION RATE: 18 BRPM | OXYGEN SATURATION: 96 % | WEIGHT: 179 LBS | TEMPERATURE: 98.1 F | HEIGHT: 63 IN | BODY MASS INDEX: 31.71 KG/M2 | SYSTOLIC BLOOD PRESSURE: 130 MMHG

## 2021-07-31 PROBLEM — O99.810 GLUCOSE INTOLERANCE OF PREGNANCY: Status: RESOLVED | Noted: 2021-06-03 | Resolved: 2021-07-31

## 2021-07-31 PROBLEM — Z30.2 ENCOUNTER FOR STERILIZATION: Status: RESOLVED | Noted: 2021-07-29 | Resolved: 2021-07-31

## 2021-07-31 PROBLEM — O32.9XX0: Status: RESOLVED | Noted: 2021-07-01 | Resolved: 2021-07-31

## 2021-07-31 LAB
ERYTHROCYTE [DISTWIDTH] IN BLOOD BY AUTOMATED COUNT: 13.3 % (ref 10–15)
HCT VFR BLD AUTO: 30.6 % (ref 35–47)
HGB BLD-MCNC: 10.4 G/DL (ref 11.7–15.7)
MCH RBC QN AUTO: 30.4 PG (ref 26.5–33)
MCHC RBC AUTO-ENTMCNC: 34 G/DL (ref 31.5–36.5)
MCV RBC AUTO: 90 FL (ref 78–100)
PLATELET # BLD AUTO: 255 10E3/UL (ref 150–450)
RBC # BLD AUTO: 3.42 10E6/UL (ref 3.8–5.2)
T PALLIDUM AB SER QL: NONREACTIVE
WBC # BLD AUTO: 16.8 10E3/UL (ref 4–11)

## 2021-07-31 PROCEDURE — 85027 COMPLETE CBC AUTOMATED: CPT | Performed by: OBSTETRICS & GYNECOLOGY

## 2021-07-31 PROCEDURE — 250N000011 HC RX IP 250 OP 636: Performed by: OBSTETRICS & GYNECOLOGY

## 2021-07-31 PROCEDURE — 36415 COLL VENOUS BLD VENIPUNCTURE: CPT | Performed by: OBSTETRICS & GYNECOLOGY

## 2021-07-31 PROCEDURE — 250N000013 HC RX MED GY IP 250 OP 250 PS 637: Performed by: OBSTETRICS & GYNECOLOGY

## 2021-07-31 RX ORDER — AMOXICILLIN 250 MG
1 CAPSULE ORAL 2 TIMES DAILY
Qty: 60 TABLET | Refills: 0 | Status: SHIPPED | OUTPATIENT
Start: 2021-07-31 | End: 2021-08-12

## 2021-07-31 RX ORDER — IBUPROFEN 800 MG/1
800 TABLET, FILM COATED ORAL EVERY 8 HOURS PRN
Qty: 100 TABLET | Refills: 0 | Status: SHIPPED | OUTPATIENT
Start: 2021-07-31 | End: 2021-08-17

## 2021-07-31 RX ORDER — OXYCODONE HYDROCHLORIDE 5 MG/1
5 TABLET ORAL EVERY 4 HOURS PRN
Qty: 25 TABLET | Refills: 0 | Status: SHIPPED | OUTPATIENT
Start: 2021-07-31 | End: 2021-08-12

## 2021-07-31 RX ADMIN — ACETAMINOPHEN 975 MG: 325 TABLET, FILM COATED ORAL at 07:20

## 2021-07-31 RX ADMIN — DOCUSATE SODIUM AND SENNOSIDES 1 TABLET: 8.6; 5 TABLET, FILM COATED ORAL at 09:23

## 2021-07-31 RX ADMIN — KETOROLAC TROMETHAMINE 30 MG: 30 INJECTION, SOLUTION INTRAMUSCULAR; INTRAVENOUS at 04:31

## 2021-07-31 RX ADMIN — ACETAMINOPHEN 975 MG: 325 TABLET, FILM COATED ORAL at 01:08

## 2021-07-31 NOTE — DISCHARGE INSTRUCTIONS
Post-op Discharge Instructions  section    Discharge Diet: Regular diet   Discharge Activity: Increase activity as tolerated. No vigorous activity or exercise for 6 weeks.    No swimming or bath for 7-10 days.    No driving or operating machinery for 2 weeks or while on narcotics.    Lifting restrictions up to 15 pounds for 6 weeks.    Pelvic rest for 6 weeks which includes intercourse, douching and tampons.   Discharge Nursing: Nursing is encouraged.    Schedule outpatient lactation follow up in 2-4 days.   Discharge Instructions: Seek immediate medical evaluation and assistance if you develop any warning signs of postpartum depression.    Avoid constipation and straining. Use stool softeners and fiber to avoid constipation.    Call the OB/GYN Clinic Nurse at [phone] for problems such as fever (temperature >100.4), chills, pain not controlled by usual oral pain medications, persistent nausea and vomiting, constipation, difficulty nursing, heavy odorous vaginal discharge, burning or pain associated with urination.  Call for excessive vaginal bleeding, saturating more than one pad an hour for more than three consecutive hours.  Call for any problems with the incision, drainage or redness from incision site or increasing pain.   Discharge Wound care: Keep your incisions clean and dry by running soapy water over them and dabbing them dry. It is best to shower for the first week while the healing process is underway; then after 7-10 days it is ok to take a bath.    The incision was closed with Steri-Strips which may fall off on their own. If they do not, then they may be removed after 10-14 days.   Discharge Follow-up: Follow up for outpatient lactation appointment in 2-4 days if desired.    Follow up for postpartum exam and incision check in 1-2 weeks with Dr. Lenz or  Dr. Avina.    Follow up for postpartum exam in 6 weeks with Dr. Lenz or [facility].    Follow up with Primary Care Provider as  scheduled for management of active medical problems and for adult preventive services.    Call Dr. Avina at the OB/GYN Clinic at [phone] as necessary if you have problems in the interim.

## 2021-07-31 NOTE — PLAN OF CARE
Face to face report given with opportunity to observe patient.  Report given to Suki NINO RN.    YNES SAEED RN  7/31/2021, 7:12 AM

## 2021-07-31 NOTE — PLAN OF CARE
Face to face report given with opportunity to observe patient.    Report given to LYNN Billingsley    Pt back in bed. SCD's and oxygen monitor on. Pt states she will try to nap. Significant other coming this evening.     Martha Armendariz RN   7/30/2021  7:29 PM

## 2021-07-31 NOTE — PLAN OF CARE
Assessments as charted. B/P: 130/82, T: 98.1, P: 67, R: 18. Rates pain: 2/10, abdominal cramping at times. Incision: Healing well, well approximated, and without signs of infection. Kahn catheter patent, draining clear, yellow urine. Fundus firm without massage, midline, -2 umbilicus. Lochia: Light, no clots. Activity: pt. has been up ambulating in room, steady on feet.  Postpartum care education provided, see Patient Education activity. Patient denies needs. Will monitor.  YNES SAEED RN

## 2021-07-31 NOTE — PROGRESS NOTES
"Name: Rachel Carvajal  Age: 35 year old  YOB: 1985   Medical Record #: [id number]     Postpartum Day 1:     DATE: 2021  SUBJECTIVE:  Patient is doing well. Her pain is well controlled with current medications. She reports normal lochia. She is tolerating a regular diet without nausea. She is ambulating and voiding. She is passing flatus. The patient denies depression symptoms. She denies Shortness of breat, headache, vision changes, Right upper quadrant pain, or Chest pain. She denies fever or chills. Her babies are stable in NICU. She is bottle feeding. She desires discharge so that she can go to NICU.    OBJECTIVE:  /84   Pulse 78   Temp 97.3  F (36.3  C) (Oral)   Resp 18   Ht 1.6 m (5' 3\")   Wt 81.2 kg (179 lb)   LMP 2020   SpO2 97%   Breastfeeding Unknown   BMI 31.71 kg/m      I/O last 3 completed shifts:  In: 1920 [P.O.:1120; I.V.:750; IV Piggyback:50]  Out: 3655 [Urine:2855; Blood:800]    Well developed and well nourished female in no apparent distress. Alert and oriented. Lungs are clear to auscultation bilaterally. Heart is regular rate and rhythm. Abdomen is nondistended with positive bowel sounds. Uterine fundus is firm and palpated below the umbilicus. Incision is clean, dry, and intact without erythema or exudate. Perineum is clean, dry and intact. Extremities no edema, non-tender. DTR 2+ bilaterally, no clonus.    LABS :  Prenatal labs  Lab Results   Component Value Date    ABO A 2021    RH Pos 2021    AS Negative 2021    HEPBANG Nonreactive 2021    TREPT Nonreactive 2021    RUQIGG 13 2021    HGB 10.4 (L) 2021    HCT 30.6 (L) 2021     2021    GBS Negative 11/10/2017    GBPCRT Negative 2021    PAP NIL 2021    CHPCRT  2017     Negative   Negative for C. trachomatis rRNA by transcription mediated amplification.   A negative result by transcription mediated amplification does " not preclude the   presence of C. trachomatis infection because results are dependent on proper   and adequate collection, absence of inhibitors, and sufficient rRNA to be   detected.      GCPCRT  2017     Negative   Negative for N. gonorrhoeae rRNA by transcription mediated amplification.   A negative result by transcription mediated amplification does not preclude the   presence of N. gonorrhoeae infection because results are dependent on proper   and adequate collection, absence of inhibitors, and sufficient rRNA to be   detected.       SARS CoV2 PCR   Date Value Ref Range Status   2021 Negative Negative Final     Comment:     NEGATIVE: SARS-CoV-2 (COVID-19) RNA not detected, presumed negative.     Pre-eclampsia labs  Recent Labs   Lab Test 21  0637 21  0557 21  1545 21  1542 21  1429 21  1558   HGB 10.4* 12.6  --  12.6 11.0* 12.2   HCT 30.6* 36.0  --   --  31.5* 34.8*    327  --  318 284 251   AST  --  36  36 31  --   --   --    ALT  --  52*  52* 44  --   --   --    CR  --  0.58  --   --   --   --    URIC  --  5.6  --   --   --   --      2021 Urine protein/ creatine ratio: 0.33  2021 Treponema: pending  2021 Hepatitis C: pending    IMPRESSION :  1. 35 year old  female at 37w0d, now delivered.  2. POD# 1 s/p Primary  section twins with bilateral salpingectomy, doing well.  3. Preeclampsia  4. Postpartum anemia with acute blood loss anemia, not a complication  5. Depression  6. History of genital herpes on antiviral prophylaxis  7. Bottlefeeding    PLAN:  1. Continue routine post-partum and post-operative care.  2. The patient has preeclampsia. Her BP were stable and normal overnight without severe range BP. She denies signs or symptoms of severe disease. The patient desires discharge so that she can be reunited with her infants in the NICU. She is able to perform activities of daily living and I feel that she can be  discharged later this morning.  3. The patient had postpartum anemia with acute blood loss anemia, not a complication. She had a  section with salpingectomy with  ml and Hgb drop to 10.4.  4. Encourage ambulation.   5. Continue with Zoloft Rx.  6. She doesn't have any herpes lesions or symptoms. The patient can stop the antiviral prophylaxis.  7. Encourage / Discuss nursing. The patient plans to bottle feed.  8. Repeat Serology / Treponema Pallidum Antibody Test obtained per [facility] protocol.  9. I discussed some routine post-operative recommendations, precautions and instructions with the patient. I discussed incision and wound care recommendations and bathing recommendations.  10. Anticipate discharge later this morning on POD# 1 in stable condition.    Nitin Avina MD  Obstetrics and Gynecology

## 2021-07-31 NOTE — PLAN OF CARE
Assessments as charted. B/P: 148/89, T: 98.1, P: 78, R: 16. Rates pain: 4/10 at incision. Incision: Healing well, well approximated, and without signs of infection. Voiding without difficulty. Fundus firm and 2 below. Lochia: scant. Activity: moving with mild difficulty d/t incisional pain. Infant feeding: Formula.      Postpartum care education provided, see Patient Education activity. Patient denies needs. Will monitor.  Paty Anna RN

## 2021-07-31 NOTE — DISCHARGE SUMMARY
"OB  BIRTH DISCHARGE SUMMARY    Name: Rachel Carvajal  Age: 35 year old  YOB: 1985   Medical Record #: [id number]     Date of Admission:  2021  Date of Discharge:  2021  Admitting Physician:  James Lenz MD  Discharge Physician:  Nitin Avina MD  Discharging Service:  Obstetrics and Gynecology     Primary Provider:   Mary Rankin         Admission Diagnoses:     Dichorionic diamniotic twin pregnancy [O30.049]  Encounter for sterilization [Z30.2]   delivery delivered [O82]          Discharge Diagnosis:     1. 35 year old  female at 37w0d, now delivered.  2. POD# 1 s/p Primary  section twins with bilateral salpingectomy, doing well.  3. Preeclampsia  4. Postpartum anemia with acute blood loss anemia, not a complication  5. Depression  6. History of genital herpes on antiviral prophylaxis  7. Bottlefeeding          Discharge Disposition:     Discharged to home.           Condition on Discharge:     Discharge condition: Stable   Discharge vitals: /89   Pulse 78   Temp 98.1  F (36.7  C) (Oral)   Resp 16   Ht 1.6 m (5' 3\")   Wt 81.2 kg (179 lb)   LMP 2020   SpO2 100%   Breastfeeding Unknown   BMI 31.71 kg/m     Code status on discharge: Full Code          Brief History of Illness:     Rachel Carvajal is a 35 year old female who was admitted for  section of twins with malpresentation, preeclampsia and desire for sterilization. Please see her admit H&P for full details of her PMH, PSH, Meds, Allergies and exam on admission.          Procedure Performed:     Primary Low Segment Transverse  Section of twins  Bilateral salpingectomy   Spinal analgesia         Infant Delivery Information:     37w0d at time of delivery.  Twin A  Delivery Date: 2021  Sex: female  Weight: 2350 gm  APGAR 1 min: 6  APGAR 5 min: 6    Twin B  Delivery Date: 2021  Sex: male  Weight: 3195 gm  APGAR 1 min: 5  APGAR 5 min: 5         " Hospital Course:     Rachel Carvajal is a 35 year old female who was admitted to the hospital for the above listed indications. She has twin gestation with malpresentation and gestational hypertension that developed into preeclampsia and she presented for elective  section delivery and salpingectomy. Please see her  Section Operative Note for full details regarding her delivery. The patient's hospital course was unremarkable. The patient has preeclampsia. Her BP were stable and mainly normal range post-op without severe range BP. She was asymptomatic of severe disease. The patient desired discharge so that she can be reunited with her infants in the NICU. The Kahn catheter was removed on POD# 1 and her diet was advanced. She recovered as anticipated and her postoperative course was uncomplicated. On POD# 1, she was meeting all of her postpartum goals and deemed stable for discharge. She was ambulating well, voiding without difficulty, tolerating a regular diet without nausea and vomiting, her pain was well controlled on oral pain medicines and her lochia was appropriate. No fever or significant wound drainage. She will be discharged home in good condition on POD# 1. Her hemoglobin is 10.4. Her Rh status is positive, and Rhogam was not indicated.          Post-Partum Complications:     None.         Contraception:     The patient had bilateral salpingectomy for contraception.         Medications Prior to Admission:     Medications Prior to Admission   Medication Sig Dispense Refill Last Dose     doxylamine (UNISOM) 25 MG TABS tablet Take 12.5 mg by mouth nightly as needed   2021 at Unknown time     Prenatal MV-Min-Fe Fum-FA-DHA (PRENATAL 1 PO) Take 1 tablet by mouth daily   2021 at Unknown time     sertraline (ZOLOFT) 100 MG tablet TAKE 1 TABLET(100 MG) BY MOUTH DAILY 30 tablet 2 2021 at Unknown time     [DISCONTINUED] acyclovir (ZOVIRAX) 400 MG tablet Take 1 tablet (400 mg) by  mouth 2 times daily 60 tablet 0 2021 at Unknown time             Discharge Medications:     Current Discharge Medication List      START taking these medications    Details   ibuprofen (ADVIL/MOTRIN) 800 MG tablet Take 1 tablet (800 mg) by mouth every 8 hours as needed for moderate pain  Qty: 100 tablet, Refills: 0    Associated Diagnoses:  delivery delivered      oxyCODONE (ROXICODONE) 5 MG tablet Take 1 tablet (5 mg) by mouth every 4 hours as needed for moderate to severe pain  Qty: 25 tablet, Refills: 0    Associated Diagnoses:  delivery delivered      senna-docusate (SENOKOT-S/PERICOLACE) 8.6-50 MG tablet Take 1 tablet by mouth 2 times daily  Qty: 60 tablet, Refills: 0    Associated Diagnoses:  delivery delivered         CONTINUE these medications which have NOT CHANGED    Details   doxylamine (UNISOM) 25 MG TABS tablet Take 12.5 mg by mouth nightly as needed      Prenatal MV-Min-Fe Fum-FA-DHA (PRENATAL 1 PO) Take 1 tablet by mouth daily      sertraline (ZOLOFT) 100 MG tablet TAKE 1 TABLET(100 MG) BY MOUTH DAILY  Qty: 30 tablet, Refills: 2    Associated Diagnoses: Mild episode of recurrent major depressive disorder (H)         STOP taking these medications       acyclovir (ZOVIRAX) 400 MG tablet Comments:   Reason for Stopping:                     Consultations:     No consultations were requested during this admission          Significant Results:     None.             Pending Results:     Pathology results are pending.           Discharge Instructions and Follow-Up:     Discharge Diet: Regular   Discharge Activity: Increase activity as tolerated. No vigorous activity or exercise for 6 weeks.    No swimming or bath for 7-10 days.    No driving or operating machinery for 2 weeks or while on narcotics.    Lifting restrictions up to 15 pounds for 6 weeks.    Pelvic rest for 6 weeks which includes intercourse, douching and tampons.   Discharge Nursing: Nursing is encouraged.    Schedule  outpatient lactation follow up in 2-4 days.   Discharge Instructions: The warning signs of postpartum depression have been reviewed, and the patient is aware that this can be a common occurrence. She is encouraged to seek immediate medical evaluation and assistance for any questions or concerns.    Instructions on avoiding constipation and straining are discussed. The patient is advised in the appropriate use of stool softeners to avoid constipation.    The patient will call the Ob/Gyn Clinic Nurse at [phone] for problems such as fever (temperature >100.4), chills, pain not controlled by usual oral pain medications, persistent nausea and vomiting, constipation, difficulty nursing, heavy odorous vaginal discharge, burning or pain associated with urination.  Call for excessive vaginal bleeding, saturating more than one pad an hour for more than three consecutive hours.  Call for any problems with the incision, drainage or redness from incision site or increasing pain.   Discharge Wound care: Routine incision care is discussed with the patient.     The patient is instructed to keep her incisions clean and dry by running soapy water over them and dabbing them dry. It is best to shower for the first week while the healing process is underway; after 7-10 days it is safe to take a bath.     The incision was closed with Steri-Strips which may fall off on their own. If they do not, then they may be removed after 10-14 days.   Discharge Follow-up: Follow up for outpatient lactation appointment in 2-4 days if desired.    Follow up for postpartum exam and incision check in 1-2 weeks with Dr. Lenz or Dr. Avina.    Follow up for postpartum exam in 6 weeks with Dr. Lenz or [facility].    Follow up with Primary Care Provider as scheduled for management of active medical problems and for adult preventive services.    Call Dr. Avina at the OB/GYN Clinic at [phone] as necessary if you have problems in the interim.      Total time spent for discharge on date of discharge: 30 minutes  I saw the patient on the date of discharge.    Nitin Avina MD  Obstetrics and Gynecology

## 2021-07-31 NOTE — PLAN OF CARE
Patient discharged at 10:33 AM via ambulation accompanied by staff. Prescriptions sent to patients preferred pharmacy. All belongings sent with patient.     Discharge instructions reviewed with patient. Belongings gathered and returned to patient.     Patient discharged to home.     Core Measures and Vaccines  Core Measures applicable during stay: yes  Pneumonia and Influenza given prior to discharge, if indicated: No    Surgical Patient   Surgical Procedures during stay: C/section and salpingectomy  Did patient receive discharge instruction on wound care and recognition of infection symptoms? Yes    MISC  Follow up appointment made:  Yes  Home and hospital aquired medications returned to patient: N/A  Patient reports pain was well managed at discharge: Yes

## 2021-08-02 LAB — HCV AB SERPL QL IA: NONREACTIVE

## 2021-08-03 LAB
PATH REPORT.COMMENTS IMP SPEC: NORMAL
PATH REPORT.COMMENTS IMP SPEC: NORMAL
PATH REPORT.FINAL DX SPEC: NORMAL
PATH REPORT.GROSS SPEC: NORMAL
PATH REPORT.MICROSCOPIC SPEC OTHER STN: NORMAL
PATH REPORT.RELEVANT HX SPEC: NORMAL
PHOTO IMAGE: NORMAL

## 2021-08-04 ENCOUNTER — MYC MEDICAL ADVICE (OUTPATIENT)
Dept: OBGYN | Facility: OTHER | Age: 36
End: 2021-08-04

## 2021-08-04 DIAGNOSIS — O14.93 PRE-ECLAMPSIA IN THIRD TRIMESTER: ICD-10-CM

## 2021-08-04 DIAGNOSIS — O09.529 ELDERLY MULTIGRAVIDA WITH ANTEPARTUM CONDITION OR COMPLICATION: ICD-10-CM

## 2021-08-04 RX ORDER — LABETALOL 100 MG/1
100 TABLET, FILM COATED ORAL 2 TIMES DAILY
Qty: 60 TABLET | Refills: 3 | Status: SHIPPED | OUTPATIENT
Start: 2021-08-04 | End: 2021-08-16

## 2021-08-04 NOTE — TELEPHONE ENCOUNTER
I called the patient.  She delivered on 07/30/2021.  He had a mild headache this morning that has improved with ibuprofen.  She denies vision changes, shortness of breath, right upper quadrant tenderness, or worsening edema.  Her lochia is decreasing and is now minimal.  She has good abdomen and incision pain control with ibuprofen, and occasional Norco.  Both of her babies are doing well.  She checked a home blood pressure which was 149/107.  This is similar to the home blood pressure readings that she was getting prior to delivery.    I discussed options with the patient and she is not able to come into clinic today or tomorrow.  I am therefore going to give her a prescription for labetalol 100 mg p.o. twice daily.  I would like for her to use ibuprofen and Tylenol as needed for mild headache.  I would like patient to see me in the next few days, and she made an appointment to see me on 08/09/2021.  She will present to the emergency room if she has severe range blood pressure, worsening headache unrelieved with ibuprofen or Tylenol, shortness of breath, right upper quadrant pain, or worsening edema.  The patient agrees with this course of action.  Nitin Avina MD

## 2021-08-04 NOTE — TELEPHONE ENCOUNTER
[facility] APRN CNPASCUAL sent to Lanette Simon LPN; Nitin Avina MD  Caller: Unspecified (Today, 12:50 PM)  Phone Number: [phone]   I will notify pt that you are placing order for Labetalol 100 mg PO BID.  Lennie will schedule appt with you on Monday.

## 2021-08-08 ENCOUNTER — DOCUMENTATION ONLY (OUTPATIENT)
Dept: OBGYN | Facility: OTHER | Age: 36
End: 2021-08-08

## 2021-08-08 NOTE — PROGRESS NOTES
"I have left chest mass" Fetal Non-Stress Test Results    NST Ordered By: RAVINDRA Oh CNM         NST Start & Stop Times                                  NST Results  Fetus A   Baseline Rate: 120  Accelerations: Present  Decelerations: None  Interpretation: reactive/reassuring  Appears to be reactive but broken strip         SOB, tiredness

## 2021-08-08 NOTE — PROGRESS NOTES
Fetal Non-Stress Test Results    NST Ordered By: RAVINDRA Oh CNM         NST Start & Stop Times                                  NST Results  Fetus B   Baseline Rate: 130  Accelerations: Present  Decelerations: None  Interpretation: reactive/reassuring  Appears to br reactive but broken strip

## 2021-08-12 ENCOUNTER — OFFICE VISIT (OUTPATIENT)
Dept: OBGYN | Facility: OTHER | Age: 36
End: 2021-08-12
Attending: OBSTETRICS & GYNECOLOGY
Payer: COMMERCIAL

## 2021-08-12 VITALS
TEMPERATURE: 98.1 F | WEIGHT: 152 LBS | RESPIRATION RATE: 16 BRPM | HEART RATE: 64 BPM | BODY MASS INDEX: 26.93 KG/M2 | SYSTOLIC BLOOD PRESSURE: 126 MMHG | DIASTOLIC BLOOD PRESSURE: 84 MMHG | HEIGHT: 63 IN

## 2021-08-12 DIAGNOSIS — O14.93 PRE-ECLAMPSIA IN THIRD TRIMESTER: ICD-10-CM

## 2021-08-12 PROCEDURE — 99207 PR NO CHARGE LOS: CPT | Performed by: OBSTETRICS & GYNECOLOGY

## 2021-08-12 ASSESSMENT — PAIN SCALES - GENERAL: PAINLEVEL: NO PAIN (0)

## 2021-08-12 ASSESSMENT — MIFFLIN-ST. JEOR: SCORE: 1348.6

## 2021-08-12 NOTE — NURSING NOTE
"Chief Complaint   Patient presents with     Post-op Visit       Initial /84   Pulse 64   Temp 98.1  F (36.7  C)   Ht 1.6 m (5' 3\")   Wt 68.9 kg (152 lb)   LMP 11/13/2020   BMI 26.93 kg/m   Estimated body mass index is 26.93 kg/m  as calculated from the following:    Height as of this encounter: 1.6 m (5' 3\").    Weight as of this encounter: 68.9 kg (152 lb).  Medication Reconciliation: complete  Lennie Siu LPN    "

## 2021-08-12 NOTE — PROGRESS NOTES
"POSTPARTUM VISIT    REASON FOR VISIT / CHEIF COMPLAINT  Postpartum exam.   section 2 week incision check.    HISTORY OF PRESENT ILLNESS  Rachel Carvajal is a 35 year old  female who is 2 weeks status post low transverse  section delivery on 2021 delivering a healthy baby twins, one boy and one girl. She presents for early postpartum visit and incision check today. She is doing well and has no complaints. Her infants are doing well.  She denies headache, vision changes, right upper quadrant pain, shortness of breath, or edema.    Delivery complications: Yes.  Preeclampsia.  Breast feeding: Yes.  Bladder problems: No.  Bowel problems / hemorrhoids: No.  Episiotomy / laceration / incision healed: Yes.  Vaginal lochia: Minimal.  Menses since delivery: No.  [place] since delivery: No.  Emotional adjustment: doing well and happy.  Depression symptoms or suicide thoughts: No.  Post delivery pain: No.  Incision concerns: No.  Contraception plans: tubal ligation.  Other concerns: No.    ALLERGIES     Allergies   Allergen Reactions     Penicillins Rash       MEDICATIONS    Current Outpatient Medications:      ibuprofen (ADVIL/MOTRIN) 800 MG tablet, Take 1 tablet (800 mg) by mouth every 8 hours as needed for moderate pain, Disp: 100 tablet, Rfl: 0     labetalol (NORMODYNE) 100 MG tablet, Take 1 tablet (100 mg) by mouth 2 times daily, Disp: 60 tablet, Rfl: 3     Prenatal MV-Min-Fe Fum-FA-DHA (PRENATAL 1 PO), Take 1 tablet by mouth daily, Disp: , Rfl:      sertraline (ZOLOFT) 100 MG tablet, TAKE 1 TABLET(100 MG) BY MOUTH DAILY, Disp: 30 tablet, Rfl: 2    REVIEW OF SYSTEMS  As per HPI, otherwise negative.    The patient's Medical Hx, Surgical Hx, Obstetrical Hx, Social Hx, and Family Hx have been reviewed and updated in the electronic medical record.    OBJECTIVE  /84   Pulse 64   Temp 98.1  F (36.7  C)   Ht 1.6 m (5' 3\")   Wt 68.9 kg (152 lb)   LMP 2020   BMI 26.93 kg/m  "     General:  Well-developed, well-nourished female in no apparent distress.  Neurological: Alert and oriented x3.  Breast: Deferred.  Abdomen: Soft, nontender, nondistended, positive bowel sounds.  No organomegaly. No rebound, no guarding. Fundus is firm and nontender above pubic symphysis. Incision is clean dry and intact.  No erythema, induration or signs of infection.  Pelvic exam: Deferred.  Extremities:  No clubbing cyanosis or edema. Nontender bilaterally.    DIAGNOSTICS  2021 Pathology: Bilateral fallopian tubes, benign  2021 Pathology: Twin placenta with dividing membrane consistent with diamniotic, dichorionic morphology.    PHQ-9 score:   PHQ 2021   PHQ-9 Total Score 0   Q9: Thoughts of better off dead/self-harm past 2 weeks Not at all       ASSESSMENT / PLAN  Rachel Carvajal is a 35 year old  female who is 2 weeks status post low transverse  section delivery on 2021.    1 Normal early postpartum exam after  section  2 Incision healing well status post  section on 2021  - The patient is making excellent postpartum progress.  - I reviewed the operation report and the indications for her  section. She had a low segment transverse uterine incision. I discussed the option of a trial of labor versus a scheduled repeat  section with her future pregnancies.  - Routine postpartum precautions, recommendations and restrictions are reviewed with the patient.  - Routine postpartum depression precautions are reviewed with the patient.  - Routine postop activity recommendations and restrictions are reviewed with the patient.  - Routine postoperative precautions, restrictions and recommendations are reviewed with the patient.  - Routine incision care instructions are reviewed with the patient.  - I recommend that the patient refrain from intercourse for 6-8 weeks after delivery.  I recommend the patient be on reliable contraception before  resuming intercourse.    3 Nursing  - Nursing is encouraged.  - Follow up with outpatient lactation appointment as needed.    4 Preeclampsia  - The patient has no signs or symptoms of severe disease.  Her blood pressure is stable and normal.  I recommend the patient continue with labetalol 100 mg p.o. twice daily until her 6-week postpartum visit and then we will taper off.    - All of the patient's questions were answered.  - Problem list reviewed and updated.  - Follow up in 4 weeks for postpartum visit including physical examination, pelvic examination and Pap smear as needed.    Nitin Avina MD  Obstetrics and Gynecology

## 2021-08-16 ENCOUNTER — HOSPITAL ENCOUNTER (EMERGENCY)
Facility: HOSPITAL | Age: 36
Discharge: LEFT BEFORE DISCHARGE | End: 2021-08-16
Admitting: NURSE PRACTITIONER
Payer: COMMERCIAL

## 2021-08-16 VITALS
DIASTOLIC BLOOD PRESSURE: 106 MMHG | SYSTOLIC BLOOD PRESSURE: 141 MMHG | OXYGEN SATURATION: 98 % | TEMPERATURE: 97.4 F | RESPIRATION RATE: 14 BRPM | HEART RATE: 81 BPM

## 2021-08-16 DIAGNOSIS — O09.529 ELDERLY MULTIGRAVIDA WITH ANTEPARTUM CONDITION OR COMPLICATION: ICD-10-CM

## 2021-08-16 DIAGNOSIS — O14.93 PRE-ECLAMPSIA IN THIRD TRIMESTER: ICD-10-CM

## 2021-08-16 PROCEDURE — 999N000104 HC STATISTIC NO CHARGE

## 2021-08-16 RX ORDER — LABETALOL 100 MG/1
150 TABLET, FILM COATED ORAL 2 TIMES DAILY
Qty: 60 TABLET | Refills: 0 | Status: SHIPPED | OUTPATIENT
Start: 2021-08-16 | End: 2021-08-17

## 2021-08-16 NOTE — CONFIDENTIAL NOTE
Pt called reporting elevated B/Ps at home and headache.  Pt was instructed to go to Emergency Department (ED).    Pt called to report B/P 141/106 in triage of ED.  Reports headache gone with Ibuprofen.  Pt not willing to wait to be seen in ED.    Contacted Dr. Holcomb, OB/GYN on-call for advise.  Instructed by Dr. Holcomb to increase Labetalol to 150 mg BID and follow up with Dr. Avina on Wednesday.  Dr. Avina not available this week.  I will see pt on Thursday and schedule follow up with Dr. Avina the following week.    Pt Instructed:  Go to ED if severe headache, distorted vision, sudden swelling in hands or face, or epigastric pain.  Pt verbalized understanding.    Medication change:    Labetalol 150 mg PO BID    RAVINDRA Porter, FAMILIA

## 2021-08-17 ENCOUNTER — HOSPITAL ENCOUNTER (OUTPATIENT)
Facility: HOSPITAL | Age: 36
Setting detail: OBSERVATION
Discharge: HOME | End: 2021-08-19
Attending: STUDENT IN AN ORGANIZED HEALTH CARE EDUCATION/TRAINING PROGRAM | Admitting: OBSTETRICS & GYNECOLOGY
Payer: COMMERCIAL

## 2021-08-17 DIAGNOSIS — O14.90 PRE-ECLAMPSIA, ANTEPARTUM: ICD-10-CM

## 2021-08-17 LAB
ABO/RH(D): NORMAL
ALBUMIN SERPL-MCNC: 3.7 G/DL (ref 3.4–5)
ALBUMIN UR-MCNC: NEGATIVE MG/DL
ALP SERPL-CCNC: 63 U/L (ref 40–150)
ALT SERPL W P-5'-P-CCNC: 25 U/L (ref 0–50)
ANION GAP SERPL CALCULATED.3IONS-SCNC: 5 MMOL/L (ref 3–14)
ANTIBODY SCREEN: NEGATIVE
APPEARANCE UR: CLEAR
AST SERPL W P-5'-P-CCNC: 21 U/L (ref 0–45)
BILIRUB DIRECT SERPL-MCNC: <0.1 MG/DL (ref 0–0.2)
BILIRUB SERPL-MCNC: 0.5 MG/DL (ref 0.2–1.3)
BILIRUB UR QL STRIP: NEGATIVE
BUN SERPL-MCNC: 18 MG/DL (ref 7–30)
CALCIUM SERPL-MCNC: 8.9 MG/DL (ref 8.5–10.1)
CHLORIDE BLD-SCNC: 107 MMOL/L (ref 94–109)
CO2 SERPL-SCNC: 26 MMOL/L (ref 20–32)
COLOR UR AUTO: ABNORMAL
CREAT SERPL-MCNC: 0.73 MG/DL (ref 0.52–1.04)
ERYTHROCYTE [DISTWIDTH] IN BLOOD BY AUTOMATED COUNT: 12.4 % (ref 10–15)
GFR SERPL CREATININE-BSD FRML MDRD: >90 ML/MIN/1.73M2
GLUCOSE BLD-MCNC: 85 MG/DL (ref 70–99)
GLUCOSE UR STRIP-MCNC: NEGATIVE MG/DL
HCT VFR BLD AUTO: 38.3 % (ref 35–47)
HGB BLD-MCNC: 12.9 G/DL (ref 11.7–15.7)
HGB UR QL STRIP: ABNORMAL
HOLD SPECIMEN: NORMAL
HOLD SPECIMEN: NORMAL
KETONES UR STRIP-MCNC: NEGATIVE MG/DL
LDH SERPL L TO P-CCNC: 222 U/L (ref 81–234)
LEUKOCYTE ESTERASE UR QL STRIP: NEGATIVE
MAGNESIUM SERPL-MCNC: 2.1 MG/DL (ref 1.6–2.3)
MCH RBC QN AUTO: 29.9 PG (ref 26.5–33)
MCHC RBC AUTO-ENTMCNC: 33.7 G/DL (ref 31.5–36.5)
MCV RBC AUTO: 89 FL (ref 78–100)
NITRATE UR QL: NEGATIVE
PH UR STRIP: 6.5 [PH] (ref 4.7–8)
PLATELET # BLD AUTO: 358 10E3/UL (ref 150–450)
POTASSIUM BLD-SCNC: 4.1 MMOL/L (ref 3.4–5.3)
PROT SERPL-MCNC: 7 G/DL (ref 6.8–8.8)
RBC # BLD AUTO: 4.32 10E6/UL (ref 3.8–5.2)
RBC URINE: <1 /HPF
SARS-COV-2 RNA RESP QL NAA+PROBE: NEGATIVE
SODIUM SERPL-SCNC: 138 MMOL/L (ref 133–144)
SP GR UR STRIP: 1 (ref 1–1.03)
SPECIMEN EXPIRATION DATE: NORMAL
URATE SERPL-MCNC: 6.2 MG/DL (ref 2.6–6)
UROBILINOGEN UR STRIP-MCNC: NORMAL MG/DL
WBC # BLD AUTO: 7 10E3/UL (ref 4–11)
WBC URINE: 1 /HPF

## 2021-08-17 PROCEDURE — 86850 RBC ANTIBODY SCREEN: CPT | Performed by: OBSTETRICS & GYNECOLOGY

## 2021-08-17 PROCEDURE — 120N000001 HC R&B MED SURG/OB

## 2021-08-17 PROCEDURE — 85027 COMPLETE CBC AUTOMATED: CPT | Performed by: STUDENT IN AN ORGANIZED HEALTH CARE EDUCATION/TRAINING PROGRAM

## 2021-08-17 PROCEDURE — C9803 HOPD COVID-19 SPEC COLLECT: HCPCS

## 2021-08-17 PROCEDURE — 96376 TX/PRO/DX INJ SAME DRUG ADON: CPT

## 2021-08-17 PROCEDURE — 84550 ASSAY OF BLOOD/URIC ACID: CPT | Performed by: STUDENT IN AN ORGANIZED HEALTH CARE EDUCATION/TRAINING PROGRAM

## 2021-08-17 PROCEDURE — 81001 URINALYSIS AUTO W/SCOPE: CPT | Performed by: STUDENT IN AN ORGANIZED HEALTH CARE EDUCATION/TRAINING PROGRAM

## 2021-08-17 PROCEDURE — 99222 1ST HOSP IP/OBS MODERATE 55: CPT | Performed by: OBSTETRICS & GYNECOLOGY

## 2021-08-17 PROCEDURE — 36415 COLL VENOUS BLD VENIPUNCTURE: CPT | Performed by: OBSTETRICS & GYNECOLOGY

## 2021-08-17 PROCEDURE — 258N000003 HC RX IP 258 OP 636: Performed by: OBSTETRICS & GYNECOLOGY

## 2021-08-17 PROCEDURE — 80053 COMPREHEN METABOLIC PANEL: CPT | Performed by: STUDENT IN AN ORGANIZED HEALTH CARE EDUCATION/TRAINING PROGRAM

## 2021-08-17 PROCEDURE — 83615 LACTATE (LD) (LDH) ENZYME: CPT | Performed by: STUDENT IN AN ORGANIZED HEALTH CARE EDUCATION/TRAINING PROGRAM

## 2021-08-17 PROCEDURE — 36415 COLL VENOUS BLD VENIPUNCTURE: CPT | Performed by: STUDENT IN AN ORGANIZED HEALTH CARE EDUCATION/TRAINING PROGRAM

## 2021-08-17 PROCEDURE — 96375 TX/PRO/DX INJ NEW DRUG ADDON: CPT

## 2021-08-17 PROCEDURE — 250N000011 HC RX IP 250 OP 636: Performed by: STUDENT IN AN ORGANIZED HEALTH CARE EDUCATION/TRAINING PROGRAM

## 2021-08-17 PROCEDURE — U0005 INFEC AGEN DETEC AMPLI PROBE: HCPCS | Performed by: STUDENT IN AN ORGANIZED HEALTH CARE EDUCATION/TRAINING PROGRAM

## 2021-08-17 PROCEDURE — 99285 EMERGENCY DEPT VISIT HI MDM: CPT | Mod: 25

## 2021-08-17 PROCEDURE — 86901 BLOOD TYPING SEROLOGIC RH(D): CPT | Performed by: OBSTETRICS & GYNECOLOGY

## 2021-08-17 PROCEDURE — 96365 THER/PROPH/DIAG IV INF INIT: CPT

## 2021-08-17 PROCEDURE — 250N000011 HC RX IP 250 OP 636: Performed by: OBSTETRICS & GYNECOLOGY

## 2021-08-17 PROCEDURE — 93010 ELECTROCARDIOGRAM REPORT: CPT | Performed by: INTERNAL MEDICINE

## 2021-08-17 PROCEDURE — 83735 ASSAY OF MAGNESIUM: CPT | Performed by: OBSTETRICS & GYNECOLOGY

## 2021-08-17 PROCEDURE — 82248 BILIRUBIN DIRECT: CPT | Mod: 91 | Performed by: STUDENT IN AN ORGANIZED HEALTH CARE EDUCATION/TRAINING PROGRAM

## 2021-08-17 PROCEDURE — G0378 HOSPITAL OBSERVATION PER HR: HCPCS

## 2021-08-17 PROCEDURE — 93005 ELECTROCARDIOGRAM TRACING: CPT

## 2021-08-17 PROCEDURE — 99285 EMERGENCY DEPT VISIT HI MDM: CPT | Performed by: STUDENT IN AN ORGANIZED HEALTH CARE EDUCATION/TRAINING PROGRAM

## 2021-08-17 PROCEDURE — 250N000013 HC RX MED GY IP 250 OP 250 PS 637: Performed by: OBSTETRICS & GYNECOLOGY

## 2021-08-17 RX ORDER — SERTRALINE HYDROCHLORIDE 100 MG/1
100 TABLET, FILM COATED ORAL DAILY
Status: DISCONTINUED | OUTPATIENT
Start: 2021-08-17 | End: 2021-08-19 | Stop reason: HOSPADM

## 2021-08-17 RX ORDER — HYDRALAZINE HYDROCHLORIDE 25 MG/1
25 TABLET, FILM COATED ORAL 3 TIMES DAILY
Status: DISCONTINUED | OUTPATIENT
Start: 2021-08-17 | End: 2021-08-19 | Stop reason: HOSPADM

## 2021-08-17 RX ORDER — CALCIUM GLUCONATE 94 MG/ML
1 INJECTION, SOLUTION INTRAVENOUS
Status: DISCONTINUED | OUTPATIENT
Start: 2021-08-17 | End: 2021-08-19 | Stop reason: HOSPADM

## 2021-08-17 RX ORDER — LIDOCAINE 40 MG/G
CREAM TOPICAL
Status: DISCONTINUED | OUTPATIENT
Start: 2021-08-17 | End: 2021-08-19 | Stop reason: HOSPADM

## 2021-08-17 RX ORDER — BUTALBITAL, ACETAMINOPHEN AND CAFFEINE 50; 325; 40 MG/1; MG/1; MG/1
2 TABLET ORAL EVERY 6 HOURS PRN
Status: DISCONTINUED | OUTPATIENT
Start: 2021-08-17 | End: 2021-08-19 | Stop reason: HOSPADM

## 2021-08-17 RX ORDER — MAGNESIUM SULFATE IN WATER 40 MG/ML
1 INJECTION, SOLUTION INTRAVENOUS CONTINUOUS
Status: ACTIVE | OUTPATIENT
Start: 2021-08-18 | End: 2021-08-18

## 2021-08-17 RX ORDER — MAGNESIUM SULFATE IN WATER 40 MG/ML
2 INJECTION, SOLUTION INTRAVENOUS CONTINUOUS
Status: DISPENSED | OUTPATIENT
Start: 2021-08-17 | End: 2021-08-18

## 2021-08-17 RX ORDER — LABETALOL 200 MG/1
200 TABLET, FILM COATED ORAL EVERY 12 HOURS SCHEDULED
Status: DISCONTINUED | OUTPATIENT
Start: 2021-08-17 | End: 2021-08-19 | Stop reason: HOSPADM

## 2021-08-17 RX ORDER — LORAZEPAM 2 MG/ML
2 INJECTION INTRAMUSCULAR
Status: DISCONTINUED | OUTPATIENT
Start: 2021-08-17 | End: 2021-08-19 | Stop reason: HOSPADM

## 2021-08-17 RX ORDER — LABETALOL 20 MG/4 ML (5 MG/ML) INTRAVENOUS SYRINGE
10-20 EVERY 30 MIN PRN
Status: DISCONTINUED | OUTPATIENT
Start: 2021-08-17 | End: 2021-08-17 | Stop reason: DRUGHIGH

## 2021-08-17 RX ORDER — MAGNESIUM SULFATE HEPTAHYDRATE 40 MG/ML
4 INJECTION, SOLUTION INTRAVENOUS
Status: DISCONTINUED | OUTPATIENT
Start: 2021-08-17 | End: 2021-08-19 | Stop reason: HOSPADM

## 2021-08-17 RX ORDER — IBUPROFEN 600 MG/1
600 TABLET, FILM COATED ORAL EVERY 6 HOURS PRN
Status: DISCONTINUED | OUTPATIENT
Start: 2021-08-17 | End: 2021-08-19 | Stop reason: HOSPADM

## 2021-08-17 RX ORDER — SODIUM CHLORIDE, SODIUM LACTATE, POTASSIUM CHLORIDE, CALCIUM CHLORIDE 600; 310; 30; 20 MG/100ML; MG/100ML; MG/100ML; MG/100ML
INJECTION, SOLUTION INTRAVENOUS CONTINUOUS
Status: DISCONTINUED | OUTPATIENT
Start: 2021-08-17 | End: 2021-08-19 | Stop reason: HOSPADM

## 2021-08-17 RX ORDER — LABETALOL 20 MG/4 ML (5 MG/ML) INTRAVENOUS SYRINGE
20-80 EVERY 10 MIN PRN
Status: DISCONTINUED | OUTPATIENT
Start: 2021-08-17 | End: 2021-08-19 | Stop reason: HOSPADM

## 2021-08-17 RX ORDER — HYDROMORPHONE HYDROCHLORIDE 1 MG/ML
0.5 INJECTION, SOLUTION INTRAMUSCULAR; INTRAVENOUS; SUBCUTANEOUS ONCE
Status: COMPLETED | OUTPATIENT
Start: 2021-08-17 | End: 2021-08-17

## 2021-08-17 RX ORDER — MAGNESIUM SULFATE HEPTAHYDRATE 40 MG/ML
2 INJECTION, SOLUTION INTRAVENOUS ONCE
Status: COMPLETED | OUTPATIENT
Start: 2021-08-17 | End: 2021-08-17

## 2021-08-17 RX ORDER — HYDRALAZINE HYDROCHLORIDE 20 MG/ML
10 INJECTION INTRAMUSCULAR; INTRAVENOUS
Status: DISCONTINUED | OUTPATIENT
Start: 2021-08-17 | End: 2021-08-19 | Stop reason: HOSPADM

## 2021-08-17 RX ORDER — ONDANSETRON 4 MG/1
4 TABLET, ORALLY DISINTEGRATING ORAL EVERY 6 HOURS PRN
Status: DISCONTINUED | OUTPATIENT
Start: 2021-08-17 | End: 2021-08-19 | Stop reason: HOSPADM

## 2021-08-17 RX ORDER — MAGNESIUM SULFATE HEPTAHYDRATE 500 MG/ML
10 INJECTION, SOLUTION INTRAMUSCULAR; INTRAVENOUS
Status: DISCONTINUED | OUTPATIENT
Start: 2021-08-17 | End: 2021-08-19 | Stop reason: HOSPADM

## 2021-08-17 RX ORDER — MAGNESIUM SULFATE HEPTAHYDRATE 40 MG/ML
4 INJECTION, SOLUTION INTRAVENOUS ONCE
Status: COMPLETED | OUTPATIENT
Start: 2021-08-17 | End: 2021-08-17

## 2021-08-17 RX ORDER — FUROSEMIDE 20 MG/1
10 TABLET ORAL DAILY
Status: DISCONTINUED | OUTPATIENT
Start: 2021-08-18 | End: 2021-08-19 | Stop reason: HOSPADM

## 2021-08-17 RX ORDER — FUROSEMIDE 20 MG/1
10 TABLET ORAL ONCE
Status: COMPLETED | OUTPATIENT
Start: 2021-08-17 | End: 2021-08-17

## 2021-08-17 RX ORDER — ONDANSETRON 2 MG/ML
4 INJECTION INTRAMUSCULAR; INTRAVENOUS EVERY 6 HOURS PRN
Status: DISCONTINUED | OUTPATIENT
Start: 2021-08-17 | End: 2021-08-19 | Stop reason: HOSPADM

## 2021-08-17 RX ORDER — MAGNESIUM SULFATE HEPTAHYDRATE 40 MG/ML
2 INJECTION, SOLUTION INTRAVENOUS
Status: DISCONTINUED | OUTPATIENT
Start: 2021-08-17 | End: 2021-08-19 | Stop reason: HOSPADM

## 2021-08-17 RX ADMIN — MAGNESIUM SULFATE IN WATER 4 G: 40 INJECTION, SOLUTION INTRAVENOUS at 15:20

## 2021-08-17 RX ADMIN — LABETALOL HYDROCHLORIDE 20 MG: 5 INJECTION, SOLUTION INTRAVENOUS at 19:37

## 2021-08-17 RX ADMIN — LABETALOL HYDROCHLORIDE 10 MG: 5 INJECTION, SOLUTION INTRAVENOUS at 14:11

## 2021-08-17 RX ADMIN — Medication 1 MG: at 21:13

## 2021-08-17 RX ADMIN — LABETALOL HYDROCHLORIDE 200 MG: 200 TABLET, FILM COATED ORAL at 19:37

## 2021-08-17 RX ADMIN — LABETALOL HYDROCHLORIDE 10 MG: 5 INJECTION, SOLUTION INTRAVENOUS at 15:33

## 2021-08-17 RX ADMIN — IBUPROFEN 600 MG: 600 TABLET, FILM COATED ORAL at 18:07

## 2021-08-17 RX ADMIN — MAGNESIUM SULFATE IN WATER 2 G/HR: 40 INJECTION, SOLUTION INTRAVENOUS at 16:39

## 2021-08-17 RX ADMIN — MAGNESIUM SULFATE IN WATER 2 G: 40 INJECTION, SOLUTION INTRAVENOUS at 15:42

## 2021-08-17 RX ADMIN — FUROSEMIDE 10 MG: 20 TABLET ORAL at 21:09

## 2021-08-17 RX ADMIN — LABETALOL HYDROCHLORIDE 10 MG: 5 INJECTION, SOLUTION INTRAVENOUS at 14:34

## 2021-08-17 RX ADMIN — SERTRALINE HYDROCHLORIDE 100 MG: 100 TABLET, FILM COATED ORAL at 18:07

## 2021-08-17 RX ADMIN — HYDROMORPHONE HYDROCHLORIDE 0.5 MG: 1 INJECTION, SOLUTION INTRAMUSCULAR; INTRAVENOUS; SUBCUTANEOUS at 14:27

## 2021-08-17 RX ADMIN — BUTALBITAL, ACETAMINOPHEN AND CAFFEINE 2 TABLET: 50; 325; 40 TABLET ORAL at 21:09

## 2021-08-17 RX ADMIN — LABETALOL HYDROCHLORIDE 20 MG: 5 INJECTION, SOLUTION INTRAVENOUS at 16:44

## 2021-08-17 RX ADMIN — LABETALOL HYDROCHLORIDE 10 MG: 5 INJECTION, SOLUTION INTRAVENOUS at 15:17

## 2021-08-17 RX ADMIN — SODIUM CHLORIDE, POTASSIUM CHLORIDE, SODIUM LACTATE AND CALCIUM CHLORIDE: 600; 310; 30; 20 INJECTION, SOLUTION INTRAVENOUS at 16:59

## 2021-08-17 ASSESSMENT — ENCOUNTER SYMPTOMS
GASTROINTESTINAL NEGATIVE: 1
CONSTITUTIONAL NEGATIVE: 1
EYES NEGATIVE: 1
HEADACHES: 1
PSYCHIATRIC NEGATIVE: 1
MUSCULOSKELETAL NEGATIVE: 1
DYSURIA: 1
CARDIOVASCULAR NEGATIVE: 1
RESPIRATORY NEGATIVE: 1

## 2021-08-17 NOTE — PROGRESS NOTES
Feeling better.  headache virtually gone.    BP still elevated and still on HTN protocol for meds.      Looks better.  Lab reviewed---platelets, LFP, CBC-OK  Urine output is good.    A:   Stable PP Preeclapsia        Labile severe HTN        Headache improved    P:   Continue current care.        Add Lasix        Hydralazine if needed.        Continue Magnesium.

## 2021-08-17 NOTE — H&P
[facility]    History and Physical  Obstetrics and Gynecology     Date of Admission:  2021    Assessment & Plan   Rachel Carvajal is a 35 year old female who presents with signs and symptoms of Severe PP Preeclampsia.    S/P C/S for twins 2 weeks ago.    Has been having HTN and headaches.  Seen as outpatient several times and urgent care.  Patient of Jesus Alberto Klein CNM.    Active Problems:    * No active hospital problems. *      Manpreet Holcomb    Code Status   Full Code    Primary Care Physician   Mary Rankin    Chief Complaint   Headache  Severe HTN  Signs/Symptoms of Severe Preeclampsia    History is obtained from the patient    History of Present Illness   Rachel Carvajal is a 35 year old female who presents with 2 weeks post  for twin gestation with Severe PP HTN, hypereflexia, headache, signs/symptoms severe Preeclampsia    Past Medical History    I have reviewed this patient's medical history and updated it with pertinent information if needed.   Past Medical History:   Diagnosis Date     Acute appendicitis with localized peritonitis 2018     Dichorionic diamniotic twin pregnancy 2021    AMA  DNA negative  Hx of genital HSV  Initiate suppression therapy @ 32 w Gestational HTN Depression Elective sterilization TWINS  Di/Di  Girl/Boy Failed 1 hr GTT.  Passed 3 hr GTT  WEEKLY  US for BPP with Doppler.  Also weekly NST.  ECHO normal for both fetus 1 and 2 NOTE:  Normal growth for Twin A (1) on 6/16/21 7/15/21:  Dopplers normal for both  EFW:  Twin A 5th %tile  Twin B 46th%tile Rh posi     Elderly multigravida with antepartum condition or complication 2021     Gestational hypertension, third trimester 2021     Glucose intolerance of pregnancy 2021    Failed screen, Normal 3 hour GTT     Herpes simplex infection of genitourinary system 2017     Mild episode of recurrent major depressive disorder (H) 2016     Pre-eclampsia in third trimester  2021       Past Surgical History   I have reviewed this patient's surgical history and updated it with pertinent information if needed.  Past Surgical History:   Procedure Laterality Date     COMBINED  SECTION, SALPINGECTOMY BILATERAL Bilateral 2021    Procedure:  SECTION, WITH BILATERAL SALPINGECTOMY;  Surgeon: James Lenz MD;  Location: [facility] OR     ENT SURGERY      TONSILECTOMY     GYN SURGERY      invetro fertilization     LAPAROSCOPIC APPENDECTOMY  06/10/2018    [facility]       Prior to Admission Medications   Prior to Admission Medications   Prescriptions Last Dose Informant Patient Reported? Taking?   ibuprofen (ADVIL/MOTRIN) 800 MG tablet 2021 at 0630  No Yes   Sig: Take 1 tablet (800 mg) by mouth every 8 hours as needed for moderate pain   labetalol (NORMODYNE) 100 MG tablet 2021 at 0630  No Yes   Sig: Take 1.5 tablets (150 mg) by mouth 2 times daily   sertraline (ZOLOFT) 100 MG tablet 2021 at Unknown time  No Yes   Sig: TAKE 1 TABLET(100 MG) BY MOUTH DAILY      Facility-Administered Medications: None     Allergies   Allergies   Allergen Reactions     Penicillins Rash       Social History   I have reviewed this patient's social history and updated it with pertinent information if needed. Rachel Carvajal  reports that she has never smoked. She has never used smokeless tobacco. She reports previous alcohol use. She reports that she does not use drugs.    Family History   I have reviewed this patient's family history and updated it with pertinent information if needed.   Family History   Problem Relation Age of Onset     Hypertension Mother      Other Cancer Maternal Grandfather      Diabetes Paternal Grandfather        Review of Systems   CONSTITUTIONAL:  negative  EYES:  Negative with no visual changes.  HEENT:  Negative except for persistent headache.  RESPIRATORY:  negative  CARDIOVASCULAR:  negative  GASTROINTESTINAL:  Negative, denies epigastric  pain.  HEMATOLOGIC/LYMPHATIC:  negative  NEUROLOGICAL:  headache  BEHAVIOR/PSYCH:  negative    Physical Exam   Temp: 97.5  F (36.4  C) Temp src: Tympanic BP: (!) 170/112 Pulse: 72   Resp: 14 SpO2: 98 % O2 Device: None (Room air)    Vital Signs with Ranges  Temp:  [97.5  F (36.4  C)] 97.5  F (36.4  C)  Pulse:  [72] 72  Resp:  [14] 14  BP: (170)/(112) 170/112  SpO2:  [98 %] 98 %  0 lbs 0 oz    Constitutional: awake, alert, cooperative, no apparent distress, and appears stated age  Respiratory: No increased work of breathing, good air exchange, clear to auscultation bilaterally, no crackles or wheezing  Cardiovascular: Normal apical impulse, regular rate and rhythm, normal S1 and S2, no S3 or S4, and no murmur noted  GI:  C/S scar, normal bowel sounds, soft, non-distended, non-tender, no masses palpated, no hepatosplenomegally  Genitounirinary: deferred  Skin/Extremities: no bruising or bleeding, normal skin color, texture, turgor, no redness, warmth, or swelling and no rashes  Musculoskeletal: There is no redness, warmth, or swelling of the joints.  Full range of motion noted.  Motor strength is 5 out of 5 all extremities bilaterally.  Tone is normal.  Neurologic: Awake, alert, oriented to name, place and time.  Cranial nerves II-XII are grossly intact.  Motor is 5 out of 5 bilaterally.  Cerebellar finger to nose, heel to shin intact.  Sensory is intact.  Babinski down going, Romberg negative, and gait is normal.  Reflexes are significant at 3-4+, equal, symmetric, with NO clonus  Neuropsychiatric: General: normal, calm and normal eye contact  Level of consciousness: alert / normal  Affect: normal, pleasant and full  Orientation: oriented to self, place, time and situation    Data   Results for orders placed or performed during the hospital encounter of 08/17/21 (from the past 24 hour(s))   [place] Draw    Narrative    The following orders were created for panel order [place] Draw.  Procedure                                Abnormality         Status                     ---------                               -----------         ------                     Extra Urine Collection[[id number]]                                                        Please view results for these tests on the individual orders.   UA with Microscopic reflex to Culture    Specimen: Urine, Midstream   Result Value Ref Range    Color Urine Straw Colorless, Straw, Light Yellow, Yellow    Appearance Urine Clear Clear    Glucose Urine Negative Negative mg/dL    Bilirubin Urine Negative Negative    Ketones Urine Negative Negative mg/dL    Specific Gravity Urine 1.005 1.003 - 1.035    Blood Urine Trace (A) Negative    pH Urine 6.5 4.7 - 8.0    Protein Albumin Urine Negative Negative mg/dL    Urobilinogen Urine Normal Normal, 2.0 mg/dL    Nitrite Urine Negative Negative    Leukocyte Esterase Urine Negative Negative    RBC Urine <1 <=2 /HPF    WBC Urine 1 <=5 /HPF    Narrative    Urine Culture not indicated   CBC with platelets   Result Value Ref Range    WBC Count 7.0 4.0 - 11.0 10e3/uL    RBC Count 4.32 3.80 - 5.20 10e6/uL    Hemoglobin 12.9 11.7 - 15.7 g/dL    Hematocrit 38.3 35.0 - 47.0 %    MCV 89 78 - 100 fL    MCH 29.9 26.5 - 33.0 pg    MCHC 33.7 31.5 - 36.5 g/dL    RDW 12.4 10.0 - 15.0 %    Platelet Count 358 150 - 450 10e3/uL   [place] Draw    Narrative    The following orders were created for panel order [place] Draw.  Procedure                               Abnormality         Status                     ---------                               -----------         ------                     Extra Blue Top Tube[[id number]]                              In process                 Extra Green Top (Lithium...[[id number]]                      In process                   Please view results for these tests on the individual orders.

## 2021-08-17 NOTE — ED PROVIDER NOTES
History     Chief Complaint   Patient presents with     Hypertension     c/o hypertension with h/a, notes provider increased her bp med yesterday but no relief. states c section 2 1/2 weeks ago     HPI  Rachel Carvajal is a 35 year old female with PMH recent pre-eclampsia s/p early  section 2 weeks ago. Now here with hypertension and headache since this morning. Headache is 6/10 and behidn the eyes and achey. She had her BP meds increased yesterday without relief. No hx of seizures. No changes in vision/hearing. No sudden onset of headache. No focal neuro deficits. No fevers at home. No hypertension before the pregnancy. No hx of blood clots.    Allergies:  Allergies   Allergen Reactions     Penicillins Rash       Problem List:    Patient Active Problem List    Diagnosis Date Noted     Pre-eclampsia, antepartum 2021     Priority: Medium     Pre-eclampsia in third trimester 2021     Priority: Medium     Elderly multigravida with antepartum condition or complication 2021     Priority: Medium     Dichorionic diamniotic twin pregnancy 2021     Priority: Medium     AMA  DNA negative   Hx of genital HSV  Initiate suppression therapy @ 32 w  Gestational HTN  Depression  Elective sterilization  TWINS  Di/Di  Girl/Boy  Failed 1 hr GTT.  Passed 3 hr GTT   WEEKLY  US for BPP with Doppler.  Also weekly NST.  ECHO normal for both fetus 1 and 2  NOTE:  Normal growth for Twin A (1) on 6/16/21  7/15/21:  Dopplers normal for both   EFW:  Twin A 5th %tile  Twin B 46th%tile  Rh positive  GBS negative  Hx of  x 2  Baby doc:  Wilkinson  FOB:  Valente  Girls:  Matt 8 and Rylee 3       Herpes simplex infection of genitourinary system 2017     Priority: Medium     Advance care planning 2016     Priority: Medium     Advance Care Planning 2016: ACP Review of Chart / Resources Provided:  Reviewed chart for advance care plan.  Rachel Villarreal has been provided information and resources to  begin or update their advance care plan.  Added by Skylar Rocha           Mild episode of recurrent major depressive disorder (H) 2016     Priority: Medium        Past Medical History:    Past Medical History:   Diagnosis Date     Acute appendicitis with localized peritonitis 2018     Dichorionic diamniotic twin pregnancy 2021     Elderly multigravida with antepartum condition or complication 2021     Gestational hypertension, third trimester 2021     Glucose intolerance of pregnancy 2021     Herpes simplex infection of genitourinary system 2017     Mild episode of recurrent major depressive disorder (H) 2016     Pre-eclampsia in third trimester 2021       Past Surgical History:    Past Surgical History:   Procedure Laterality Date     COMBINED  SECTION, SALPINGECTOMY BILATERAL Bilateral 2021    Procedure:  SECTION, WITH BILATERAL SALPINGECTOMY;  Surgeon: James Lenz MD;  Location: [facility]     ENT SURGERY      TONSILECTOMY     GYN SURGERY      invetro fertilization     LAPAROSCOPIC APPENDECTOMY  06/10/2018    [facility]     Family History:    Family History   Problem Relation Age of Onset     Hypertension Mother      Other Cancer Maternal Grandfather      Diabetes Paternal Grandfather      Social History:  Marital Status:   [2]  Social History     Tobacco Use     Smoking status: Never Smoker     Smokeless tobacco: Never Used   Substance Use Topics     Alcohol use: Not Currently     Alcohol/week: 0.0 standard drinks     Comment: rare     Drug use: No        Medications:    sertraline (ZOLOFT) 100 MG tablet          Review of Systems   Constitutional: Negative.    HENT: Negative.    Eyes: Negative.    Respiratory: Negative.    Cardiovascular: Negative.    Gastrointestinal: Negative.    Genitourinary: Positive for dysuria.   Musculoskeletal: Negative.    Skin: Negative.    Neurological: Positive for headaches.    Psychiatric/Behavioral: Negative.        Physical Exam   BP: (!) 170/112  Pulse: 72  Temp: 97.5  F (36.4  C)  Resp: 14  SpO2: 98 %      Physical Exam  Vitals and nursing note reviewed.   Constitutional:       Appearance: Normal appearance.   HENT:      Head: Normocephalic.      Right Ear: External ear normal.      Left Ear: External ear normal.      Nose: Nose normal.      Mouth/Throat:      Mouth: Mucous membranes are moist.      Pharynx: Oropharynx is clear.   Eyes:      Extraocular Movements: Extraocular movements intact.      Pupils: Pupils are equal, round, and reactive to light.   Cardiovascular:      Rate and Rhythm: Normal rate and regular rhythm.      Pulses: Normal pulses.   Pulmonary:      Effort: Pulmonary effort is normal.      Breath sounds: Normal breath sounds.   Abdominal:      General: Abdomen is flat. Bowel sounds are normal.   Musculoskeletal:         General: No swelling or tenderness. Normal range of motion.      Cervical back: Normal range of motion and neck supple.   Skin:     General: Skin is warm and dry.      Capillary Refill: Capillary refill takes less than 2 seconds.   Neurological:      General: No focal deficit present.      Mental Status: She is alert and oriented to person, place, and time. Mental status is at baseline.      Cranial Nerves: No cranial nerve deficit.   Psychiatric:         Mood and Affect: Mood normal.         ED Course   Headache and hypertension post partum. Suspect pre-eclampsia. Low suspicion for dural venous sinus thrombosis given the nature of the headache, normal neuro exam, hypertension. She has the hx of pre-eclampsia. Otherwise well appearing. No hx of PE. Not a concerning headache for bleed given the slow progressive onset and hx. Plan on work-up for pre-eclampsia and admission. Started on Magnesium and Labetalol.    ED Course as of Aug 17 1603   Tue Aug 17, 2021   1438 Discussed with OB team who will admit for pre-eclampsia treatment. Loading with  magnesium as well as labetalol.             EKG Interpretation:      Interpreted by JULISA RUTHERFORD MD  Time reviewed: 1600  Symptoms at time of EKG: None   Rhythm: normal sinus   Rate: normal  Axis: normal  Ectopy: none  Conduction: normal  ST Segments/ T Waves: No ST-T wave changes  Q Waves: none    Clinical Impression: normal EKG               Results for orders placed or performed during the hospital encounter of 08/17/21 (from the past 24 hour(s))   [place] Draw    Narrative    The following orders were created for panel order [place] Draw.  Procedure                               Abnormality         Status                     ---------                               -----------         ------                     Extra Urine Collection[[id number]]                                                        Please view results for these tests on the individual orders.   UA with Microscopic reflex to Culture    Specimen: Urine, Midstream   Result Value Ref Range    Color Urine Straw Colorless, Straw, Light Yellow, Yellow    Appearance Urine Clear Clear    Glucose Urine Negative Negative mg/dL    Bilirubin Urine Negative Negative    Ketones Urine Negative Negative mg/dL    Specific Gravity Urine 1.005 1.003 - 1.035    Blood Urine Trace (A) Negative    pH Urine 6.5 4.7 - 8.0    Protein Albumin Urine Negative Negative mg/dL    Urobilinogen Urine Normal Normal, 2.0 mg/dL    Nitrite Urine Negative Negative    Leukocyte Esterase Urine Negative Negative    RBC Urine <1 <=2 /HPF    WBC Urine 1 <=5 /HPF    Narrative    Urine Culture not indicated   CBC with platelets   Result Value Ref Range    WBC Count 7.0 4.0 - 11.0 10e3/uL    RBC Count 4.32 3.80 - 5.20 10e6/uL    Hemoglobin 12.9 11.7 - 15.7 g/dL    Hematocrit 38.3 35.0 - 47.0 %    MCV 89 78 - 100 fL    MCH 29.9 26.5 - 33.0 pg    MCHC 33.7 31.5 - 36.5 g/dL    RDW 12.4 10.0 - 15.0 %    Platelet Count 358 150 - 450 10e3/uL   Hepatic panel   Result Value Ref Range    Bilirubin  Total 0.5 0.2 - 1.3 mg/dL    Bilirubin Direct <0.1 0.0 - 0.2 mg/dL    Protein Total 7.0 6.8 - 8.8 g/dL    Albumin 3.7 3.4 - 5.0 g/dL    Alkaline Phosphatase 63 40 - 150 U/L    AST 21 0 - 45 U/L    ALT 25 0 - 50 U/L   Lactate Dehydrogenase   Result Value Ref Range    Lactate Dehydrogenase 222 81 - 234 U/L   Uric acid   Result Value Ref Range    Uric Acid 6.2 (H) 2.6 - 6.0 mg/dL   Basic metabolic panel   Result Value Ref Range    Sodium 138 133 - 144 mmol/L    Potassium 4.1 3.4 - 5.3 mmol/L    Chloride 107 94 - 109 mmol/L    Carbon Dioxide (CO2) 26 20 - 32 mmol/L    Anion Gap 5 3 - 14 mmol/L    Urea Nitrogen 18 7 - 30 mg/dL    Creatinine 0.73 0.52 - 1.04 mg/dL    Calcium 8.9 8.5 - 10.1 mg/dL    Glucose 85 70 - 99 mg/dL    GFR Estimate >90 >60 mL/min/1.73m2   [place] Draw    Narrative    The following orders were created for panel order [place] Draw.  Procedure                               Abnormality         Status                     ---------                               -----------         ------                     Extra Blue Top Tube[[id number]]                              Final result               Extra Green Top (Lithium...[[id number]]                      Final result                 Please view results for these tests on the individual orders.   Extra Blue Top Tube   Result Value Ref Range    Hold Specimen JIC    Extra Green Top (Lithium Heparin) Tube   Result Value Ref Range    Hold Specimen JI    Asymptomatic COVID-19 Virus (Coronavirus) by PCR Nasopharyngeal    Specimen: Nasopharyngeal; Swab   Result Value Ref [place]    SARS CoV2 PCR Negative Negative    Narrative    Testing was performed using the Xpert Xpress SARS-CoV-2 Assay on the   Cepheid Gene-Xpert Instrument Systems. Additional information about   this Emergency Use Authorization (EUA) assay can be found via the Lab   Guide. This test should be ordered for the detection of SARS-CoV-2 in   individuals who meet SARS-CoV-2 clinical and/or  epidemiological   criteria. Test performance is unknown in asymptomatic patients. This   test is for in vitro diagnostic use under the FDA EUA for   laboratories certified under CLIA to perform high complexity testing.   This test has not been FDA cleared or approved. A negative result   does not rule out the presence of PCR inhibitors in the specimen or   target RNA in concentration below the limit of detection for the   assay. The possibility of a false negative should be considered if   the patient's recent exposure or clinical presentation suggests   COVID-19. This test was validated by [facility]. This laboratory is certified under the Clinical Laboratory Improve  ment Amendments (CLIA) as qualified to perform high complexity testing.   Magnesium   Result Value Ref [place]    Magnesium 2.1 1.6 - 2.3 mg/dL       Medications   labetalol (NORMODYNE/TRANDATE) syringe 10-20 mg (10 mg Intravenous Given 8/17/21 1434)   labetalol (NORMODYNE/TRANDATE) syringe 10-20 mg (10 mg Intravenous Given 8/17/21 1533)   magnesium sulfate 2 g in water intermittent infusion (has no administration in time range)   magnesium sulfate 4 g in 100 mL sterile water (premade) (has no administration in time range)   magnesium sulfate injection 10 g (has no administration in time range)   LORazepam (ATIVAN) injection 2 mg (has no administration in time range)   labetalol (NORMODYNE/TRANDATE) syringe 20-80 mg (has no administration in time range)   hydrALAZINE (APRESOLINE) injection 10 mg (has no administration in time range)   labetalol (NORMODYNE/TRANDATE) algorithm-medication instruction (has no administration in time range)   lactated ringers infusion (has no administration in time range)   magnesium sulfate infusion (has no administration in time range)   calcium gluconate 10 % injection 1 g (has no administration in time range)   lidocaine 1 % 0.1-1 mL (has no administration in time range)   lidocaine (LMX4)  kit (has no administration in time range)   sodium chloride (PF) 0.9% PF flush 3 mL (has no administration in time range)   sodium chloride (PF) 0.9% PF flush 3 mL (has no administration in time range)   melatonin tablet 1 mg (has no administration in time range)   ibuprofen (ADVIL/MOTRIN) tablet 600 mg (has no administration in time range)   ondansetron (ZOFRAN-ODT) ODT tab 4 mg (has no administration in time range)     Or   ondansetron (ZOFRAN) injection 4 mg (has no administration in time range)   labetalol (NORMODYNE) tablet 200 mg (has no administration in time range)   sertraline (ZOLOFT) tablet 100 mg (has no administration in time range)   HYDROmorphone (PF) (DILAUDID) injection 0.5 mg (0.5 mg Intravenous Given 8/17/21 1427)   magnesium sulfate 4 g in 100 mL sterile water (premade) (0 g Intravenous Stopped 8/17/21 1541)     Followed by   magnesium sulfate 2 g in water intermittent infusion (2 g Intravenous New Bag 8/17/21 1542)       Assessments & Plan (with Medical Decision Making)     I have reviewed the nursing notes.    Headache and hypertension, recent post-partum. Concern for pre-eclampsia which is most consistent iwht hx. No infectious type symptoms. See ED Course for further MDM.    I have reviewed the findings, diagnosis, plan and need for follow up with the patient.      New Prescriptions    No medications on file       Final diagnoses:   Pre-eclampsia, antepartum       8/17/2021   [facility]     Wolfgang Sheets MD  08/17/21 3110

## 2021-08-17 NOTE — PLAN OF CARE
[facility] Inpatient Admission Note:    Patient admitted to 4244/4244-1 at approximately 1620 via cart accompanied by spouse from emergency room . Report received from Renate CASTILLO RN in SBAR format at 1610 via telephone. Patient transferred to bed via self.. Patient is alert and oriented X 3, denies pain; rates at 0 on 0-10 scale.  Patient oriented to room, unit, hourly rounding, and plan of care. Explained admission packet and patient handbook with patient bill of rights brochure. Will continue to monitor and document as needed.     Inpatient Nursing criteria listed below was met:    Health care directives status obtained and documented: Yes    Patient identifies a surrogate decision maker: Yes If yes, who:Valente Contact Information:[phone]     If initial lactic acid greater than 2.0, repeat lactic acid drawn within one hour of arrival to unit: NA. If no, state reason: NA    Clergy visit ordered if patient requests: No    Skin issues/needs documented: Yes    Isolation Patient: no Education given, correct sign in place and documentation row added to PCS:  NA    Fall Prevention Yes: Care plan updated, education given and documented, sticker and magnet in place: Yes    Care Plan initiated: Yes    Education Documented (including assessment): Yes    Patient has discharge needs : No If yes, please explain:na

## 2021-08-17 NOTE — PLAN OF CARE
Pt feeling ok besides having a slight HA in her right eye. Ibuprofen given. Pt up to bathroom and voiding. Bp's slowly stabilizing.  at bedside and brought pt food to eat. Pt would like toiletries for the morning. Will continue to provide cares and monitor as needed.

## 2021-08-18 LAB — HOLD SPECIMEN: NORMAL

## 2021-08-18 PROCEDURE — 99232 SBSQ HOSP IP/OBS MODERATE 35: CPT | Performed by: OBSTETRICS & GYNECOLOGY

## 2021-08-18 PROCEDURE — 250N000011 HC RX IP 250 OP 636: Performed by: OBSTETRICS & GYNECOLOGY

## 2021-08-18 PROCEDURE — G0378 HOSPITAL OBSERVATION PER HR: HCPCS

## 2021-08-18 PROCEDURE — 250N000013 HC RX MED GY IP 250 OP 250 PS 637: Performed by: OBSTETRICS & GYNECOLOGY

## 2021-08-18 PROCEDURE — 120N000001 HC R&B MED SURG/OB

## 2021-08-18 RX ADMIN — BUTALBITAL, ACETAMINOPHEN AND CAFFEINE 2 TABLET: 50; 325; 40 TABLET ORAL at 08:04

## 2021-08-18 RX ADMIN — MAGNESIUM SULFATE IN WATER 2 G/HR: 40 INJECTION, SOLUTION INTRAVENOUS at 02:45

## 2021-08-18 RX ADMIN — LABETALOL HYDROCHLORIDE 200 MG: 200 TABLET, FILM COATED ORAL at 19:53

## 2021-08-18 RX ADMIN — FUROSEMIDE 10 MG: 20 TABLET ORAL at 09:33

## 2021-08-18 RX ADMIN — IBUPROFEN 600 MG: 600 TABLET, FILM COATED ORAL at 08:04

## 2021-08-18 RX ADMIN — IBUPROFEN 600 MG: 600 TABLET, FILM COATED ORAL at 01:05

## 2021-08-18 RX ADMIN — IBUPROFEN 600 MG: 600 TABLET, FILM COATED ORAL at 18:35

## 2021-08-18 RX ADMIN — SERTRALINE HYDROCHLORIDE 100 MG: 100 TABLET, FILM COATED ORAL at 20:42

## 2021-08-18 RX ADMIN — LABETALOL HYDROCHLORIDE 200 MG: 200 TABLET, FILM COATED ORAL at 08:04

## 2021-08-18 NOTE — PLAN OF CARE
Pt has spent much of the day in bed, resting. Pt up to the bathroom with SBA-1. BP has normalized. Pt denies headache and/or visual disturbance on assessment. Patellar reflexes 2+/2+. No clonus. Magnesium continues to infuse at 1 gram/hour (25 ml/hour). Bilateral SCDs on.

## 2021-08-18 NOTE — PLAN OF CARE
/80   Pulse 88   Temp 97.3  F (36.3  C) (Oral)   Resp 16   Wt 68.4 kg (150 lb 14.4 oz)   LMP 11/13/2020   SpO2 93%   BMI 26.73 kg/m  Assessment as charted. Fioricet given for HA, pt. reported relief. Denies s/sx of pre-eclampsia. Pt. received Lasix PO, adequate urine output. IV infusing without difficulty.  SCD's on bilat. LE.  Will continue to monitor.

## 2021-08-18 NOTE — PLAN OF CARE
Face to face report given with opportunity to observe patient.    Report given to Danielle Andrade RN   8/18/2021  10:14 AM

## 2021-08-18 NOTE — PLAN OF CARE
"/92   Pulse 80   Temp 97.7  F (36.5  C) (Oral)   Resp 16   Wt 68.4 kg (150 lb 14.4 oz)   LMP 11/13/2020   SpO2 93%   BMI 26.73 kg/m  , pt. rates pain 3/10 dull, constant right side of forehead, behind right eye area.  Ibuprofen given as documented on MAR, pt.reported pain is \"about the same.\" Denies visual changes, no nausea, denies epigastric pain, no shortness of breath, absent for clonus, bilat. reflexes +2, urine output WDL.  IV  Magnesium 2g @50ml/hr  & LR @10ml/hr infusing without difficulty.  Neuro checks WDL, as documented. SCD's to bilat. FAVIO mckay, Dr. Holcomb updated about pt. & BP readings,  orders received. YNES SAEED RN  8/17/2021, 7:54 PM       "

## 2021-08-18 NOTE — PLAN OF CARE
Face to face report given with opportunity to observe patient.    Report given to Adina Andrade RN   8/17/2021  7:12 PM

## 2021-08-18 NOTE — PROGRESS NOTES
Medical record and Td Score reviewed.  No apparent needs noted at this time. Care Transitions will remain available if needs arise.

## 2021-08-18 NOTE — PLAN OF CARE
/79   Pulse 90   Temp 97.7  F (36.5  C) (Oral)   Resp 16   Wt 68.4 kg (150 lb 14.4 oz)   LMP 11/13/2020   SpO2 96%   BMI 26.73 kg/m  Pt. denies pain, no present s/sx of pre-eclampsia. Neuro checks WDL as documented. IV Mag 1g/25ml per hr& 10ml LR infusing without difficulty. Pt. Is resting in bed, SCD's on bilat. LE, denies needs at this time.

## 2021-08-18 NOTE — PLAN OF CARE
Face to face report given with opportunity to observe patient.  Report given to LYNN Suresh.    YNES SAEED RN  8/18/2021, 7:06 AM

## 2021-08-18 NOTE — PROGRESS NOTES
[facility]    Obstetrics Post-Op / Progress Note    Assessment & Plan    Severe Post Partum Preeclampsia  Severe PP HTN  2 week post     Assessment:  -* No surgery found *      Doing better  Feeling better.  Headache improved.  Clean wound without signs of infection.  Pain well-controlled.  BP improved  Diuresing well  Reflexes are improved.    Plan:  Discontinue Magnesium at 1800 today  Maintain Labetolol 200 mg BID  Maintain Lasix 10 mg q AM  Hydralazine prn.  Anticipate discharge tomorrow.  Sign out to Dr. Lenz at 1700 today.    Manpreet Holcomb     Interval History   Doing better.  Headache improved.  BP improved.  Reflexes are improved.    Medications     labetalol (NORMODYNE/TRANDATE) algorithm-medication instruction       lactated ringers 10 mL/hr at 21 0543     magnesium sulfate 1 g/hr (21 0558)       furosemide  10 mg Oral Daily     hydrALAZINE  25 mg Oral TID     labetalol  200 mg Oral Q12H ROSA     sertraline  100 mg Oral Daily     sodium chloride (PF)  3 mL Intracatheter Q8H       Physical Exam   Temp: 97.7  F (36.5  C) Temp src: Oral BP: 110/79 Pulse: 90   Resp: 16 SpO2: 96 % O2 Device: None (Room air)    Vitals:    21 1800   Weight: 68.4 kg (150 lb 14.4 oz)     Vital Signs with Ranges  Temp:  [97.3  F (36.3  C)-98.1  F (36.7  C)] 97.7  F (36.5  C)  Pulse:  [64-90] 90  Resp:  [14-16] 16  BP: (110-182)/() 110/79  SpO2:  [88 %-99 %] 96 %  I/O last 3 completed shifts:  In: -   Out: 1350 [Urine:1350]    Uterine fundus is firm, non-tender.  Incision C/D/I  Extremities Non-tender  Heart is regular rate and rhythm and lungs clear to auscultation  Neuro is normalized.  Reflexes are 2-3+, equal, symmetric, without clonus  Urine output is good and clear, diuresing well.    Data   Recent Labs   Lab Test 21  1604 21  1557   ABO  --  A   RH  --  Pos   AS Negative Neg     Recent Labs   Lab Test 21  1417 21  0637   HGB 12.9 10.4*     Recent Labs   Lab  Test 01/26/21  1558   GREGGG 13

## 2021-08-19 VITALS
DIASTOLIC BLOOD PRESSURE: 95 MMHG | HEART RATE: 68 BPM | TEMPERATURE: 98.5 F | OXYGEN SATURATION: 98 % | BODY MASS INDEX: 27.38 KG/M2 | RESPIRATION RATE: 14 BRPM | SYSTOLIC BLOOD PRESSURE: 134 MMHG | WEIGHT: 154.54 LBS

## 2021-08-19 PROCEDURE — G0378 HOSPITAL OBSERVATION PER HR: HCPCS

## 2021-08-19 PROCEDURE — 250N000013 HC RX MED GY IP 250 OP 250 PS 637: Performed by: OBSTETRICS & GYNECOLOGY

## 2021-08-19 PROCEDURE — 99238 HOSP IP/OBS DSCHRG MGMT 30/<: CPT | Performed by: OBSTETRICS & GYNECOLOGY

## 2021-08-19 RX ORDER — LABETALOL 100 MG/1
200 TABLET, FILM COATED ORAL EVERY 12 HOURS
Qty: 60 TABLET | Refills: 1 | Status: SHIPPED | OUTPATIENT
Start: 2021-08-19 | End: 2021-09-16

## 2021-08-19 RX ADMIN — LABETALOL HYDROCHLORIDE 200 MG: 200 TABLET, FILM COATED ORAL at 08:38

## 2021-08-19 RX ADMIN — FUROSEMIDE 10 MG: 20 TABLET ORAL at 08:38

## 2021-08-19 NOTE — DISCHARGE SUMMARY
Discharge Summary    Rachel Carvajal MRN# [id number]   YOB: 1985 Age: 35 year old     Date of Admission:  2021  Date of Discharge:  2021  Admitting Physician:  David Franke Frow, MD  Discharge Physician:  James Lenz MD  Discharging Service:  Obstetrics and Gynecology     Primary Provider: Mary Rankin          Admission Diagnoses:   Pre-eclampsia, postpartum          Discharge Diagnosis:   Patient Active Problem List   Diagnosis     Mild episode of recurrent major depressive disorder (H)     Advance care planning     Herpes simplex infection of genitourinary system     Dichorionic diamniotic twin pregnancy     Elderly multigravida with antepartum condition or complication     Pre-eclampsia in third trimester     Preeclampsia in postpartum period--SEVERE--2 weeks post  for Twins---2021     Hypertension, postpartum condition or complication--SEVERE---2021                Discharge Disposition:   Discharged to home           Condition on Discharge:   Discharge condition: Stable   Discharge vitals: Blood pressure 146/97, pulse 68, temperature 98.5  F (36.9  C), temperature source Oral, resp. rate 14, weight 70.1 kg (154 lb 8.7 oz), last menstrual period 2020, SpO2 98 %, unknown if currently breastfeeding.   Code status on discharge: Full Code           Procedures / Labs / Imaging:   Invasive procedures: None            Medications Prior to Admission:     Medications Prior to Admission   Medication Sig Dispense Refill Last Dose     sertraline (ZOLOFT) 100 MG tablet TAKE 1 TABLET(100 MG) BY MOUTH DAILY 30 tablet 2 2021 at Unknown time      Labetalol 200 mg daily         Discharge Medications:     Current Discharge Medication List      CONTINUE these medications which have NOT CHANGED    Details   sertraline (ZOLOFT) 100 MG tablet TAKE 1 TABLET(100 MG) BY MOUTH DAILY  Qty: 30 tablet, Refills: 2    Associated Diagnoses: Mild episode of recurrent major  depressive disorder (H)                   Consultations:   No consultations were requested during this admission             Brief History of Illness:   Rachel Carvajal is a 35 year old female who was admitted worsening Preeclampsia 2 weeks postpartum          Hospital Course:     Preeclampsia in postpartum period--SEVERE--2 weeks post  for Twins---2021    Hypertension, postpartum condition or complication--SEVERE---2021   Pt received antihypertensive therapy and IV magnesium for 24 hours.  Her blood pressures improved and her symptoms resolved during hospitalization.   She was discharged on HD #2.                 Significant Results:   None             Pending Results:   None           Discharge Instructions and Follow-Up:   Discharge diet: Regular   Discharge activity: No lifting,  or strenuous exercise for 4 week(s)     Discharge follow-up: Follow up with CNM in 1 wk for BP check.   Wound care: None   Other instructions: None

## 2021-08-19 NOTE — PLAN OF CARE
Face to face report given with opportunity to observe patient.  Report given to LYNN Santos.    YNES SAEED RN  8/19/2021, 6:59 AM

## 2021-08-19 NOTE — PLAN OF CARE
Assessment as charted. VSS. Pt denies pain. Pt further denies headache and/or visual disturbance. Pt up independently in room. IV x 1 saline locked.

## 2021-08-19 NOTE — PLAN OF CARE
/76   Pulse 73   Temp 97.9  F (36.6  C) (Oral)   Resp 16   Wt 70.1 kg (154 lb 8.7 oz)   LMP 11/13/2020   SpO2 96%   BMI 27.38 kg/m  Assessment as charted. Pt. denies pain, no s/sx of pre-eclampsia noted.   No clonus, bilat LE 2+ reflex, IV lock intact, SCD's to bilat LE.  Will continue to monitor.

## 2021-08-19 NOTE — DISCHARGE INSTRUCTIONS
Discharge Instructions for High Blood Pressure (Hypertension)  You have been diagnosed with high blood pressure (hypertension). This means the force of blood against your artery walls is too strong. It also means your heart is working hard to move blood. High blood pressure usually has no symptoms, but over time, it can cause serious health problems. High blood pressure raises your risk for heart attack, stroke, heart disease, heart failure, kidney disease, and vision loss. With help from your doctor, you can manage your blood pressure and protect your health.  Blood pressure measurements are given as 2 numbers. Systolic blood pressure is the upper number. This is the pressure when the heart contracts or pumps. Diastolic blood pressure is the lower number. This is the pressure when the heart relaxes between beats.  Blood pressure is categorized as normal, elevated, or stage 1 or stage 2 high blood pressure:    Normal blood pressure is systolic of less than 120 and diastolic of less than 80 (120/80) at rest    Elevated blood pressure is systolic of 120 to 129 and diastolic less than 80 at rest    Stage 1 high blood pressure is systolic is 130 to 139 or diastolic between 80 to 89 at rest    Stage 2 high blood pressure is when systolic is 140 or higher or the diastolic is 90 or higher at rest  Taking medicine    Learn to measure your own blood pressure. Keep a record of your results. Ask your doctor which readings mean that you need medical attention.    Take your blood pressure medicine exactly as directed. Don t skip doses. Missing doses can cause your blood pressure to get out of control.    If you do miss a dose (or doses) check with your healthcare provider about what to do.    Don't take medicines that contain heart stimulants, including over-the-counter medicines. Check for warnings about high blood pressure on the label. Ask the pharmacist before purchasing something you haven't used before    Check with your  doctor or pharmacist before taking a decongestant such as pseudoephedrine or phenylephrine. Some decongestants can worsen high blood pressure.    Lifestyle changes    Stay at a healthy weight. Get help to lose any extra pounds (kilograms). Often times meeting with a dietitian can help you identify changes that can be made to your diet to help with weight loss.    Cut back on salt.  ? Limit canned, dried, packaged, and fast foods.  ? Don t add salt to your food at the table.  ? Season foods with herbs instead of salt when you cook.  ? Request no added salt when you go to a restaurant.  ? The American Heart Association (AHA) says the ideal amount of sodium is no more than 1,500 mg a day.  But because Americans eat so much salt, you can make a positive change by cutting back to even 2,300 mg of sodium a day (1 teaspoon).     Follow the DASH (Dietary Approaches to Stop Hypertension) eating plan. This plan recommends vegetables, fruits, whole gains, and other heart healthy foods.    Eat food rich in potassium.    Begin an exercise program. Ask your healthcare provider how to get started. The AHA recommends aerobic exercise 3 to 4 times a week for an average of 40 minutes at a time to lower blood pressure, with your provider's approval. Simple activities such as walking or gardening can help.    Break the smoking habit. Enroll in a stop-smoking program to improve your chances of success. Ask your healthcare provider about programs and medicines to help you stop smoking.    Limit drinks that contain caffeine such as coffee, black or green tea, and cola to 2 per day.    Never take stimulants such as amphetamines or cocaine. These drugs can be deadly for someone with high blood pressure.    Control your stress. Learn ways to manage stress.    Limit alcohol to no more than 1 drink a day for women and 2 drinks a day for men.    When to call your healthcare provider  Call your healthcare provider right away if you have any of  the following:    Chest pain or shortness of breath ( call 911)    Moderate to severe headache    Weakness in the muscles of your face, arms, or legs    Trouble speaking    Extreme drowsiness    Confusion    Fainting or dizziness    Pulsating or rushing sound in your ears    Unexplained nosebleed    Weakness, tingling, or numbness of your face, arms, or legs    Change in vision    Blood pressure measured at home that is greater than 180/110  [employer] last reviewed this educational content on 8/1/2019 2000-2021 [employer]. All rights reserved. This information is not intended as a substitute for professional medical care. Always follow your healthcare professional's instructions.

## 2021-08-19 NOTE — PLAN OF CARE
Patient discharged at 10:35 AM via ambulation accompanied by spouse and staff. Prescriptions sent to patients preferred pharmacy. All belongings sent with patient.     Discharge instructions reviewed with pt and her spouse. Listed belongings gathered and returned to patient.    Patient discharged to home.     Surgical Patient: N/A    MISC  Follow up appointment made:  Yes  Home medications returned to patient: N/A  Patient reports pain was well managed at discharge: Yes

## 2021-08-24 ENCOUNTER — OFFICE VISIT (OUTPATIENT)
Dept: OBGYN | Facility: OTHER | Age: 36
End: 2021-08-24
Attending: ADVANCED PRACTICE MIDWIFE
Payer: COMMERCIAL

## 2021-08-24 VITALS
BODY MASS INDEX: 27.73 KG/M2 | WEIGHT: 156.5 LBS | DIASTOLIC BLOOD PRESSURE: 88 MMHG | OXYGEN SATURATION: 98 % | SYSTOLIC BLOOD PRESSURE: 130 MMHG | HEART RATE: 75 BPM | HEIGHT: 63 IN

## 2021-08-24 DIAGNOSIS — O09.529 ELDERLY MULTIGRAVIDA WITH ANTEPARTUM CONDITION OR COMPLICATION: ICD-10-CM

## 2021-08-24 DIAGNOSIS — O14.93 PRE-ECLAMPSIA IN THIRD TRIMESTER: ICD-10-CM

## 2021-08-24 LAB
ALBUMIN UR-MCNC: NEGATIVE MG/DL
APPEARANCE UR: CLEAR
BILIRUB UR QL STRIP: NEGATIVE
COLOR UR AUTO: YELLOW
GLUCOSE UR STRIP-MCNC: NEGATIVE MG/DL
HGB UR QL STRIP: ABNORMAL
KETONES UR STRIP-MCNC: NEGATIVE MG/DL
LEUKOCYTE ESTERASE UR QL STRIP: NEGATIVE
NITRATE UR QL: NEGATIVE
PH UR STRIP: 6.5 [PH] (ref 5–7)
SP GR UR STRIP: <=1.005 (ref 1–1.03)
UROBILINOGEN UR STRIP-ACNC: 0.2 E.U./DL

## 2021-08-24 PROCEDURE — 81003 URINALYSIS AUTO W/O SCOPE: CPT | Performed by: ADVANCED PRACTICE MIDWIFE

## 2021-08-24 PROCEDURE — 99214 OFFICE O/P EST MOD 30 MIN: CPT | Performed by: ADVANCED PRACTICE MIDWIFE

## 2021-08-24 ASSESSMENT — MIFFLIN-ST. JEOR: SCORE: 1369.01

## 2021-08-24 ASSESSMENT — PAIN SCALES - GENERAL: PAINLEVEL: MILD PAIN (2)

## 2021-08-24 NOTE — PROGRESS NOTES
"Rachel Carvajal is a 35 year old female  Here today for postpartum hypertension/preeclampsia.  Hx of hospitalization from 8/17/21 through 8/19/21.  Reports dull headache that is persistent, she rates 2-3/10 at the worst.  She feels the headache may be caused from lack of sleep (twin newborns).  Denies distorted vision, epigastric, or sudden swelling in hands or face.  Reports moments when she doesn't feel well.    At-home B/Ps were around 150/112 this past weekend with company  Her B/P at-home this morning was 138/101  Clinic B/P 130/88    Pt reports she feels the Labetalol is starting to work.      O:   /88   Pulse 75   Ht 1.6 m (5' 3\")   Wt 71 kg (156 lb 8 oz)   LMP 11/13/2020   SpO2 98%   Breastfeeding No   BMI 27.72 kg/m     Pleasant without acute distress.    A:    PP hypertension  Hx of PP preeclampsia  Currently taking Labetalol 200 mg PO BID  Reports dull headache  Urine negative for protein    P:    Continue with Labetalol  UA without micro  Consult with OB/GYN/ contact pt with any changes in care or medication  Continue to monitor B/P at home.  Report B/P at or greater than 160/110  Call or My Chart Dr. Lenz next week with at-home B/P (will adjust medication if indicated)  RTO for on 9/16/21 for 6 week PP visit and as needed    30 minutes spent on the date of the encounter doing chart review, history and exam, documentation and further activities per the note    RAVINDRA Porter, FAMILIA    "

## 2021-08-24 NOTE — NURSING NOTE
"Chief Complaint   Patient presents with     Hospital F/U     hospitalized - for postpartum pre-eclampsia. Delivered twins via  2021.        Initial /88   Pulse 75   Ht 1.6 m (5' 3\")   Wt 71 kg (156 lb 8 oz)   LMP 2020   SpO2 98%   Breastfeeding No   BMI 27.72 kg/m   Estimated body mass index is 27.72 kg/m  as calculated from the following:    Height as of this encounter: 1.6 m (5' 3\").    Weight as of this encounter: 71 kg (156 lb 8 oz).  Medication Reconciliation: complete  ADELE JOY LPN    "

## 2021-08-24 NOTE — PATIENT INSTRUCTIONS
Thank you for allowing Jesus Alberto Klein CNM and our OB team to participate in your care. Please call our office at [phone] with scheduling questions or with any other questions or concerns.

## 2021-09-16 ENCOUNTER — PRENATAL OFFICE VISIT (OUTPATIENT)
Dept: OBGYN | Facility: OTHER | Age: 36
End: 2021-09-16
Attending: ADVANCED PRACTICE MIDWIFE
Payer: COMMERCIAL

## 2021-09-16 VITALS
BODY MASS INDEX: 27.29 KG/M2 | DIASTOLIC BLOOD PRESSURE: 76 MMHG | HEIGHT: 63 IN | WEIGHT: 154 LBS | SYSTOLIC BLOOD PRESSURE: 127 MMHG

## 2021-09-16 PROBLEM — O09.529 ELDERLY MULTIGRAVIDA WITH ANTEPARTUM CONDITION OR COMPLICATION: Status: RESOLVED | Noted: 2021-06-28 | Resolved: 2021-09-16

## 2021-09-16 PROBLEM — O14.93 PRE-ECLAMPSIA IN THIRD TRIMESTER: Status: RESOLVED | Noted: 2021-07-30 | Resolved: 2021-09-16

## 2021-09-16 PROCEDURE — 99213 OFFICE O/P EST LOW 20 MIN: CPT | Mod: 24 | Performed by: ADVANCED PRACTICE MIDWIFE

## 2021-09-16 PROCEDURE — 99207 PR POST PARTUM EXAM: CPT | Performed by: ADVANCED PRACTICE MIDWIFE

## 2021-09-16 RX ORDER — LABETALOL 100 MG/1
100 TABLET, FILM COATED ORAL EVERY 12 HOURS
Qty: 60 TABLET | Refills: 1 | Status: SHIPPED | OUTPATIENT
Start: 2021-09-16 | End: 2023-01-06

## 2021-09-16 ASSESSMENT — ANXIETY QUESTIONNAIRES
7. FEELING AFRAID AS IF SOMETHING AWFUL MIGHT HAPPEN: NOT AT ALL
6. BECOMING EASILY ANNOYED OR IRRITABLE: NOT AT ALL
2. NOT BEING ABLE TO STOP OR CONTROL WORRYING: NOT AT ALL
4. TROUBLE RELAXING: NOT AT ALL
3. WORRYING TOO MUCH ABOUT DIFFERENT THINGS: NOT AT ALL
1. FEELING NERVOUS, ANXIOUS, OR ON EDGE: NOT AT ALL
GAD7 TOTAL SCORE: 0
5. BEING SO RESTLESS THAT IT IS HARD TO SIT STILL: NOT AT ALL

## 2021-09-16 ASSESSMENT — PAIN SCALES - GENERAL: PAINLEVEL: NO PAIN (0)

## 2021-09-16 ASSESSMENT — MIFFLIN-ST. JEOR: SCORE: 1352.67

## 2021-09-16 ASSESSMENT — PATIENT HEALTH QUESTIONNAIRE - PHQ9: SUM OF ALL RESPONSES TO PHQ QUESTIONS 1-9: 0

## 2021-09-16 NOTE — NURSING NOTE
"Chief Complaint   Patient presents with     Post Partum Exam     6 wk PP        Initial /76 (BP Location: Left arm, Patient Position: Chair, Cuff Size: Adult Regular)   Ht 1.6 m (5' 3\")   Wt 69.9 kg (154 lb)   LMP 11/13/2020   BMI 27.28 kg/m   Estimated body mass index is 27.28 kg/m  as calculated from the following:    Height as of this encounter: 1.6 m (5' 3\").    Weight as of this encounter: 69.9 kg (154 lb).  Medication Reconciliation: complete  Lizeth Sinha LPN    "

## 2021-09-16 NOTE — PATIENT INSTRUCTIONS
Return to office in one year for well woman care and as needed.    Thank you for allowing Jesus Alberto WAITE CNM and our OB team to participate in your care.  If you have a scheduling or an appointment question please contact  [facility] Coordinator at their direct line [phone].   ALL nursing questions or concerns can be directed to your OB nurse at: [phone] Ross Stanley/Lennie

## 2021-09-16 NOTE — PROGRESS NOTES
"Rachel Carvajal is a 36 year old female is 6 weeks post partum  /76 (BP Location: Left arm, Patient Position: Chair, Cuff Size: Adult Regular)   Ht 1.6 m (5' 3\")   Wt 69.9 kg (154 lb)   LMP 11/13/2020   BMI 27.28 kg/m    Pleasant without acute distress  Incision clean  Breast feeding:  n        Baby name: Key   Spontaneous vaginal delivery: n  PCS   PHQ9:  0  Bleeding:  Reports getting her period  Vulva:  na  Family planning:  Bilateral salpingectomy   Other concerns:  PP hypertension medication    Assessment:    PP 6 w  Post-op PCS with twins 6 w  Incision clean  Hx of PP hypertension  Currently taking Labetalol 200 mg PO BID  Denies preeclampsia warning signs:  Severe headache, distorted vision, epigastric pain, or sudden swelling in hands or face.    Plan:   Incision check  Consult with Dr. Lenz on Labetalol dosage  Labetalol 100 mg PO BID  B/P check in 2 weeks      30 minutes spent on the date of the encounter doing chart review, history and exam, documentation and further activities per the RAVINDRA Laura, FAMILIA    "

## 2021-09-17 DIAGNOSIS — F33.0 MILD EPISODE OF RECURRENT MAJOR DEPRESSIVE DISORDER (H): ICD-10-CM

## 2021-09-17 RX ORDER — SERTRALINE HYDROCHLORIDE 100 MG/1
TABLET, FILM COATED ORAL
Qty: 30 TABLET | Refills: 2 | Status: SHIPPED | OUTPATIENT
Start: 2021-09-17 | End: 2021-12-17

## 2021-09-17 ASSESSMENT — ANXIETY QUESTIONNAIRES: GAD7 TOTAL SCORE: 0

## 2021-09-17 NOTE — TELEPHONE ENCOUNTER
sertraline (ZOLOFT) 100 MG tablet      Last Written Prescription Date:  6-11-21  Last Fill Quantity: 30,   # refills: 2  Last Office Visit: 2-23-21  Future Office visit:       Routing refill request to provider for review/approval because:   Recent (6 mo) or future (30 days) visit within the authorizing provider's specialty

## 2021-09-25 ENCOUNTER — HEALTH MAINTENANCE LETTER (OUTPATIENT)
Age: 36
End: 2021-09-25

## 2021-10-13 ENCOUNTER — NURSE TRIAGE (OUTPATIENT)
Dept: FAMILY MEDICINE | Facility: OTHER | Age: 36
End: 2021-10-13

## 2021-10-13 ENCOUNTER — OFFICE VISIT (OUTPATIENT)
Dept: FAMILY MEDICINE | Facility: OTHER | Age: 36
End: 2021-10-13
Attending: FAMILY MEDICINE
Payer: COMMERCIAL

## 2021-10-13 DIAGNOSIS — Z20.822 EXPOSURE TO 2019 NOVEL CORONAVIRUS: ICD-10-CM

## 2021-10-13 DIAGNOSIS — Z20.822 EXPOSURE TO 2019 NOVEL CORONAVIRUS: Primary | ICD-10-CM

## 2021-10-13 PROCEDURE — U0005 INFEC AGEN DETEC AMPLI PROBE: HCPCS

## 2021-10-13 PROCEDURE — U0003 INFECTIOUS AGENT DETECTION BY NUCLEIC ACID (DNA OR RNA); SEVERE ACUTE RESPIRATORY SYNDROME CORONAVIRUS 2 (SARS-COV-2) (CORONAVIRUS DISEASE [COVID-19]), AMPLIFIED PROBE TECHNIQUE, MAKING USE OF HIGH THROUGHPUT TECHNOLOGIES AS DESCRIBED BY CMS-2020-01-R: HCPCS

## 2021-10-13 NOTE — TELEPHONE ENCOUNTER
Reason for Disposition    [1] CLOSE CONTACT COVID-19 EXPOSURE within last 14 days AND [2] NO symptoms    Additional Information    Negative: COVID-19 lab test positive    Negative: [1] Lives with someone known to have influenza (flu test positive) AND [2] flu-like symptoms (e.g., cough, runny nose, sore throat, SOB; with or without fever)    Negative: [1] Symptoms of COVID-19 (e.g., cough, fever, SOB, or others) AND [2] HCP diagnosed COVID-19 based on symptoms    Negative: [1] Symptoms of COVID-19 (e.g., cough, fever, SOB, or others) AND [2] lives in an area with community spread    Negative: [1] Symptoms of COVID-19 (e.g., cough, fever, SOB, or others) AND [2] within 14 days of EXPOSURE (close contact) with diagnosed or suspected COVID-19 patient    Negative: [1] Symptoms of COVID-19 (e.g., cough, fever, SOB, or others) AND [2] within 14 days of travel from high-risk area for COVID-19 community spread (identified by CDC)    Negative: [1] Difficulty breathing (shortness of breath) occurs AND [2] onset > 14 days after COVID-19 EXPOSURE (Close Contact)    Negative: [1] Dry cough occurs AND [2] onset > 14 days after COVID-19 EXPOSURE    Negative: [1] Wet cough (i.e., white-yellow, yellow, green, or mere colored sputum) AND [2] onset > 14 days after COVID-19 EXPOSURE    Negative: [1] Common cold symptoms AND [2] onset > 14 days after COVID-19 EXPOSURE    Negative: COVID-19 vaccine reaction suspected (e.g., fever, headache, muscle aches) occurring during days 1-3 after getting vaccine    Negative: COVID-19 vaccine, questions about    Negative: [1] CLOSE CONTACT COVID-19 EXPOSURE within last 14 days AND [2] needs COVID-19 lab test to return to work AND [3] NO symptoms    Negative: [1] CLOSE CONTACT COVID-19 EXPOSURE within last 14 days AND [2] exposed person is a  (e.g., police or paramedic) AND [3] NO symptoms    Negative: [1] CLOSE CONTACT COVID-19 EXPOSURE within last 14 days AND [2] exposed person  "is a healthcare worker who was NOT using all recommended personal protective equipment (e.g., a respirator-N95 mask, eye protection, gloves, and gown) AND [3] NO symptoms    Negative: [1] Living or working in a correctional facility, long-term care facility, or shelter (i.e., congregate setting; densely populated) AND [2] where an outbreak has occurred AND [3] NO symptoms    Answer Assessment - Initial Assessment Questions  1. COVID-19 EXPOSURE: \"Please describe how you were exposed to someone with a COVID-19 infection.\"      daughter  2. PLACE of CONTACT: \"Where were you when you were exposed to COVID-19?\" (e.g., home, school, medical waiting room; which city?)      home  3. TYPE of CONTACT: \"How much contact was there?\" (e.g., sitting next to, live in same house, work in same office, same building)      Live in same home  4. DURATION of CONTACT: \"How long were you in contact with the COVID-19 patient?\" (e.g., a few seconds, passed by person, a few minutes, 15 minutes or longer, live with the patient)      Live in same home  5. MASK: \"Were you wearing a mask?\" \"Was the other person wearing a mask?\" Note: wearing a mask reduces the risk of an otherwise close contact.      Live in same home  6. DATE of CONTACT: \"When did you have contact with a COVID-19 patient?\" (e.g., how many days ago)      Monday, 10/11  7. COMMUNITY SPREAD: \"Are there lots of cases of COVID-19 (community spread) where you live?\" (See public health department website, if unsure)        yes  8. SYMPTOMS: \"Do you have any symptoms?\" (e.g., fever, cough, breathing difficulty, loss of taste or smell)      no  9. PREGNANCY OR POSTPARTUM: \"Is there any chance you are pregnant?\" \"When was your last menstrual period?\" \"Did you deliver in the last 2 weeks?\"      No  10. HIGH RISK: \"Do you have any heart or lung problems?\" \"Do you have a weak immune system?\" (e.g., heart failure, COPD, asthma, HIV positive, chemotherapy, renal failure, diabetes mellitus, " "sickle cell anemia, obesity)        No  11. TRAVEL: \"Have you traveled out of the country recently?\" If Yes, ask: \"When and where?\" Also ask about out-of-state travel, since the [facility] has identified some high-risk cities for community spread in the . Note: Travel becomes less relevant if there is widespread community transmission where the patient lives.        no    Protocols used: CORONAVIRUS (COVID-19) EXPOSURE-A- 8.25.2021      "

## 2021-10-14 LAB — SARS-COV-2 RNA RESP QL NAA+PROBE: NEGATIVE

## 2021-12-15 DIAGNOSIS — F33.0 MILD EPISODE OF RECURRENT MAJOR DEPRESSIVE DISORDER (H): ICD-10-CM

## 2021-12-17 RX ORDER — SERTRALINE HYDROCHLORIDE 100 MG/1
TABLET, FILM COATED ORAL
Qty: 30 TABLET | Refills: 2 | Status: SHIPPED | OUTPATIENT
Start: 2021-12-17 | End: 2022-05-26

## 2021-12-17 NOTE — TELEPHONE ENCOUNTER
SERTRALINE 100MG TABLETS  Last Written Prescription Date:  9/17/2021  Last Fill Quantity: 30,   # refills: 2  Last Office Visit: 10/13/2021  Future Office visit:       Routing refill request to provider for review/approval because:

## 2022-03-21 ENCOUNTER — OFFICE VISIT (OUTPATIENT)
Dept: OBGYN | Facility: OTHER | Age: 37
End: 2022-03-21
Attending: NURSE PRACTITIONER
Payer: COMMERCIAL

## 2022-03-21 VITALS
BODY MASS INDEX: 29.23 KG/M2 | OXYGEN SATURATION: 99 % | DIASTOLIC BLOOD PRESSURE: 74 MMHG | HEIGHT: 63 IN | HEART RATE: 75 BPM | WEIGHT: 165 LBS | SYSTOLIC BLOOD PRESSURE: 121 MMHG

## 2022-03-21 DIAGNOSIS — Z30.011 ENCOUNTER FOR INITIAL PRESCRIPTION OF CONTRACEPTIVE PILLS: Primary | ICD-10-CM

## 2022-03-21 DIAGNOSIS — R68.82 DECREASED LIBIDO: ICD-10-CM

## 2022-03-21 LAB
ERYTHROCYTE [DISTWIDTH] IN BLOOD BY AUTOMATED COUNT: 13.2 % (ref 10–15)
HCT VFR BLD AUTO: 37.5 % (ref 35–47)
HGB BLD-MCNC: 12.3 G/DL (ref 11.7–15.7)
MCH RBC QN AUTO: 28.1 PG (ref 26.5–33)
MCHC RBC AUTO-ENTMCNC: 32.8 G/DL (ref 31.5–36.5)
MCV RBC AUTO: 86 FL (ref 78–100)
PLATELET # BLD AUTO: 260 10E3/UL (ref 150–450)
RBC # BLD AUTO: 4.37 10E6/UL (ref 3.8–5.2)
T4 FREE SERPL-MCNC: 0.79 NG/DL (ref 0.76–1.46)
TSH SERPL DL<=0.005 MIU/L-ACNC: 1.09 MU/L (ref 0.4–4)
WBC # BLD AUTO: 6.8 10E3/UL (ref 4–11)

## 2022-03-21 PROCEDURE — 84439 ASSAY OF FREE THYROXINE: CPT | Performed by: NURSE PRACTITIONER

## 2022-03-21 PROCEDURE — 82306 VITAMIN D 25 HYDROXY: CPT | Performed by: NURSE PRACTITIONER

## 2022-03-21 PROCEDURE — 84443 ASSAY THYROID STIM HORMONE: CPT | Performed by: NURSE PRACTITIONER

## 2022-03-21 PROCEDURE — 85027 COMPLETE CBC AUTOMATED: CPT | Performed by: NURSE PRACTITIONER

## 2022-03-21 PROCEDURE — 36415 COLL VENOUS BLD VENIPUNCTURE: CPT | Performed by: NURSE PRACTITIONER

## 2022-03-21 PROCEDURE — 99214 OFFICE O/P EST MOD 30 MIN: CPT | Performed by: NURSE PRACTITIONER

## 2022-03-21 ASSESSMENT — PAIN SCALES - GENERAL: PAINLEVEL: NO PAIN (0)

## 2022-03-21 NOTE — NURSING NOTE
"Chief Complaint   Patient presents with     Personal     Hormone levels        Initial /74 (BP Location: Right arm, Patient Position: Sitting, Cuff Size: Adult Regular)   Pulse 75   Ht 1.6 m (5' 3\")   Wt 74.8 kg (165 lb)   SpO2 99%   BMI 29.23 kg/m   Estimated body mass index is 29.23 kg/m  as calculated from the following:    Height as of this encounter: 1.6 m (5' 3\").    Weight as of this encounter: 74.8 kg (165 lb).  Medication Reconciliation: complete     Paty Agustin LPN    "

## 2022-03-21 NOTE — PROGRESS NOTES
"Rachel Carvajal is a 36 year old P3 here today for decreased libido. Menses regular q 30 days lasting 3-6 days with light to moderate flow. LMP 2/29/22. Pt has a 9 year old, 4 year old, and 8 month old twins delivered via C/S in July 2021. History of bilateral salpingectomy at time of C/S. 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     Pt reports decreased libido beginning after delivery of twins, with a further decrease in libido occurring 3-4 months ago. Pt reports minimal sexual thoughts with no desire to initiate intercourse. Pt is not currently breastfeeding and denies pelvic pain. Denies dyspareunia, but reports intermittent vaginal dryness. Pt has tried vaginal lubricant with minimal improvement. Reports  is supportive and they communicate well.     Pt has a history of uterine prolapse; reports feeling pelvic pressure in the first few weeks of her cycle, but symptoms then resolve after ovulation. Pt was going to pelvic floor therapy but reports minimal improvement in symptoms.     History of postpartum depression treated with 100 mg sertraline. Currently weaning off of sertraline and is now taking wellbutrin beginning 3/11/22. Reports stable mood.       EXAM    /74 (BP Location: Right arm, Patient Position: Sitting, Cuff Size: Adult Regular)   Pulse 75   Ht 1.6 m (5' 3\")   Wt 74.8 kg (165 lb)   SpO2 99%   BMI 29.23 kg/m       General: Pleasant without acute distress.       ASSESSMENT/PLAN:    1. Encounter for initial prescription of contraceptive pills    - norethindrone-ethinyl estradiol (ORTHO-NOVUM 7/7/7) 0.5/0.75/1-35 MG-MCG tablet; Take 1 tablet by mouth daily  Dispense: 84 tablet; Refill: 1  - Counseled on estrogen containing OCPs possibly being effective in increasing libido.    2. Decreased libido    - CBC with platelets  - Vitamin D Deficiency  - T4, free  - TSH  - Pt educated on the effects of antidepressants, thyroid function, vitamin D levels, and anemia " on libido.   - Counseled to prioritize self care and sleep to improve mood and libido.   - Increase foreplay and continue utilizing vaginal lubricant for dryness.   - Consider surgical management of uterine prolapse and physician referral for possible hormonal evaluation as appropriate.

## 2022-03-23 LAB — DEPRECATED CALCIDIOL+CALCIFEROL SERPL-MC: 29 UG/L (ref 20–75)

## 2022-05-15 DIAGNOSIS — F33.0 MILD EPISODE OF RECURRENT MAJOR DEPRESSIVE DISORDER (H): ICD-10-CM

## 2022-05-16 NOTE — TELEPHONE ENCOUNTER
Zoloft       Last Written Prescription Date:  12/17/21  Last Fill Quantity: 30,   # refills: 2  Last Office Visit: 10/10/2019  Future Office visit:

## 2022-05-20 NOTE — TELEPHONE ENCOUNTER
Attempt # 2  Outcome: Left Message   Comment: left vm for patient to call back and schedule med review with pcp

## 2022-05-26 RX ORDER — SERTRALINE HYDROCHLORIDE 100 MG/1
TABLET, FILM COATED ORAL
Qty: 30 TABLET | Refills: 2 | Status: SHIPPED | OUTPATIENT
Start: 2022-05-26 | End: 2022-05-27

## 2022-05-27 ENCOUNTER — VIRTUAL VISIT (OUTPATIENT)
Dept: FAMILY MEDICINE | Facility: OTHER | Age: 37
End: 2022-05-27
Attending: FAMILY MEDICINE
Payer: COMMERCIAL

## 2022-05-27 DIAGNOSIS — F33.0 MILD EPISODE OF RECURRENT MAJOR DEPRESSIVE DISORDER (H): Primary | ICD-10-CM

## 2022-05-27 PROCEDURE — 99213 OFFICE O/P EST LOW 20 MIN: CPT | Mod: 95 | Performed by: FAMILY MEDICINE

## 2022-05-27 NOTE — PROGRESS NOTES
"Rachel is a 36 year old who is being evaluated via a billable telephone visit.      What phone number would you like to be contacted at? [phone]  How would you like to obtain your AVS? [employer]    Assessment & Plan     1. Mild episode of recurrent major depressive disorder (H)  Will stop Zoloft, and will keep Wellbutrin dose as is.  If in a few weeks she feels adjustments are needed, she is asked to send a message in [employer] and we can try increasing the Wellbutrin to 300 mg.  Follow-up as needed.       BMI:   Estimated body mass index is 29.23 kg/m  as calculated from the following:    Height as of 3/21/22: 1.6 m (5' 3\").    Weight as of 3/21/22: 74.8 kg (165 lb).     Return if symptoms worsen or fail to improve.    Mary Rankin MD  [facility]   Rachel is a 36 year old who presents for the following health issues    HPI     Depression Followup    How are you doing with your depression since your last visit? No change    Are you having other symptoms that might be associated with depression? No    Have you had a significant life event?  No     Are you feeling anxious or having panic attacks?   No    Do you have any concerns with your use of alcohol or other drugs? No     Patient states she had a little of the zombie effect (no emotions) while on the Zoloft.  She didn't really realize that until she ran out of Zoloft and stopped taking it.  She thinks she would like to stay on just the Wellbutrin for now, she does ask if the dose could go up if needed.    Social History     Tobacco Use     Smoking status: Never Smoker     Smokeless tobacco: Never Used   Substance Use Topics     Alcohol use: Not Currently     Alcohol/week: 0.0 standard drinks     Comment: rare     Drug use: No     PHQ 2/23/2021 9/16/2021 5/27/2022   PHQ-9 Total Score 0 0 0   Q9: Thoughts of better off dead/self-harm past 2 weeks Not at all Not at all Not at all     JOHANA-7 SCORE 2/23/2021 9/16/2021 " 5/27/2022   Total Score - - 0 (minimal anxiety)   Total Score 0 0 0         Suicide Assessment Five-step Evaluation and Treatment (SAFE-T)      How many servings of fruits and vegetables do you eat daily?  2-3    On average, how many sweetened beverages do you drink each day (Examples: soda, juice, sweet tea, etc.  Do NOT count diet or artificially sweetened beverages)?   0    How many days per week do you exercise enough to make your heart beat faster? 3 or less    How many minutes a day do you exercise enough to make your heart beat faster? 9 or less    How many days per week do you miss taking your medication? 0      Review of Systems   Constitutional, HEENT, cardiovascular, pulmonary, gi and gu systems are negative, except as otherwise noted.      Objective           Vitals:  No vitals were obtained today due to virtual visit.    Physical Exam   PSYCH: Alert and oriented times 3; coherent speech, normal   rate and volume, able to articulate logical thoughts, able   to abstract reason, no tangential thoughts, no hallucinations   or delusions  Her affect is normal and pleasant  RESP: No cough, no audible wheezing, able to talk in full sentences  Remainder of exam unable to be completed due to telephone visits            Phone call duration: 5 minutes

## 2022-06-02 DIAGNOSIS — F33.0 MILD EPISODE OF RECURRENT MAJOR DEPRESSIVE DISORDER (H): ICD-10-CM

## 2022-06-02 RX ORDER — BUPROPION HYDROCHLORIDE 150 MG/1
150 TABLET ORAL EVERY MORNING
Qty: 30 TABLET | Refills: 2 | Status: SHIPPED | OUTPATIENT
Start: 2022-06-02 | End: 2023-01-03

## 2022-06-21 DIAGNOSIS — F33.0 MILD EPISODE OF RECURRENT MAJOR DEPRESSIVE DISORDER (H): ICD-10-CM

## 2022-06-23 RX ORDER — BUPROPION HYDROCHLORIDE 150 MG/1
TABLET ORAL
Qty: 30 TABLET | Refills: 2 | OUTPATIENT
Start: 2022-06-23

## 2022-10-14 DIAGNOSIS — F33.0 MILD EPISODE OF RECURRENT MAJOR DEPRESSIVE DISORDER (H): ICD-10-CM

## 2022-10-14 RX ORDER — SERTRALINE HYDROCHLORIDE 100 MG/1
100 TABLET, FILM COATED ORAL DAILY
Qty: 90 TABLET | Refills: 1 | Status: SHIPPED | OUTPATIENT
Start: 2022-10-14 | End: 2023-04-25

## 2022-10-14 NOTE — TELEPHONE ENCOUNTER
Sertraline      Last Written Prescription Date:  8/31/22  Last Fill Quantity: 30,   # refills: 1  Last Office Visit: 5/27/22  Future Office visit:       Routing refill request to provider for review/approval because:

## 2022-12-04 NOTE — TELEPHONE ENCOUNTER
Patient arrival time for 0530 on 2021 for  confirmed multiple times throughout conversation. Patient plans on bringing , Valente, into OR with her.  Patient ok with skin to skin in OR and plans on bottle feeding.  Informed patient of visitor policy.  Patient had no questions at this time.  
None

## 2022-12-22 DIAGNOSIS — F33.0 MILD EPISODE OF RECURRENT MAJOR DEPRESSIVE DISORDER (H): ICD-10-CM

## 2022-12-22 NOTE — TELEPHONE ENCOUNTER
buPROPion (WELLBUTRIN XL) 150 MG 24 hr tablet      Last Written Prescription Date:  6-2-2022  Last Fill Quantity: 30,   # refills: 2  Last Office Visit: 5-  Future Office visit:       Routing refill request to provider for review/approval because:

## 2023-01-03 ENCOUNTER — NURSE TRIAGE (OUTPATIENT)
Dept: FAMILY MEDICINE | Facility: OTHER | Age: 38
End: 2023-01-03

## 2023-01-03 RX ORDER — BUPROPION HYDROCHLORIDE 150 MG/1
TABLET ORAL
Qty: 30 TABLET | Refills: 0 | Status: SHIPPED | OUTPATIENT
Start: 2023-01-03 | End: 2023-01-06

## 2023-01-03 NOTE — TELEPHONE ENCOUNTER
Protocol advises patient to be seen within 3 days for HTN. Reading today was 134/106 and while on the phone with triage reading was 149/113 with at home automatic BP machine.   Patient is scheduled with PCP this Friday.     Next 5 appointments (look out 90 days)    Jan 06, 2023  2:30 PM  (Arrive by 2:15 PM)  SHORT with Mary Rankin MD  [facility] ([facility] ) [address]  [facility] [address]  [phone]          Reason for Disposition    Systolic BP  >= 160 OR Diastolic >= 100    Additional Information    Negative: Difficult to awaken or acting confused (e.g., disoriented, slurred speech)    Negative: SEVERE difficulty breathing (e.g., struggling for each breath, speaks in single words)    Negative: [1] Weakness of the face, arm or leg on one side of the body AND [2] new-onset    Negative: [1] Numbness (i.e., loss of sensation) of the face, arm or leg on one side of the body AND [2] new-onset    Negative: [1] Chest pain lasts > 5 minutes AND [2] history of heart disease (i.e., heart attack, bypass surgery, angina, angioplasty, CHF)    Negative: [1] Chest pain AND [2] took nitrogylcerin AND [3] pain was not relieved    Negative: Sounds like a life-threatening emergency to the triager    Negative: Symptom is main concern (e.g., headache, chest pain)    Negative: Low blood pressure is main concern    Negative: [1] Systolic BP  >= 160 OR Diastolic >= 100 AND [2] cardiac or neurologic symptoms (e.g., chest pain, difficulty breathing, unsteady gait, blurred vision)    Negative: [1] Pregnant 20 or more weeks (or postpartum < 6 weeks) AND [2] new hand or face swelling    Negative: [1] Pregnant 20 or more weeks (or postpartum < 6 weeks) AND [2] Systolic BP >= 160 OR Diastolic >= 100    Negative: [1] Systolic BP  >= 200 OR Diastolic >= 120 AND [2] having NO cardiac or neurologic symptoms    Negative: [1] Pregnant 20 or more weeks (or postpartum < 6  "weeks) AND [2] Systolic BP  >= 140 OR Diastolic >= 90    Negative: [1] Systolic BP  >= 180 OR Diastolic >= 110 AND [2] missed most recent dose of blood pressure medication    Negative: Systolic BP  >= 180 OR Diastolic >= 110    Negative: Ran out of BP medications    Answer Assessment - Initial Assessment Questions  1. BLOOD PRESSURE: \"What is the blood pressure?\" \"Did you take at least two measurements 5 minutes apart?\"      134/106, taken BP while on the phone with triage and BP was 149/113  2. ONSET: \"When did you take your blood pressure?\"      This morning about 30 minutes  3. HOW: \"How did you obtain the blood pressure?\" (e.g., visiting nurse, automatic home BP monitor)      At home BP monitor  4. HISTORY: \"Do you have a history of high blood pressure?\"      When she was pregnant at pre ecalmpsia  5. MEDICATIONS: \"Are you taking any medications for blood pressure?\" \"Have you missed any doses recently?\"      No   6. OTHER SYMPTOMS: \"Do you have any symptoms?\" (e.g., headache, chest pain, blurred vision, difficulty breathing, weakness)      Headache  7. PREGNANCY: \"Is there any chance you are pregnant?\" \"When was your last menstrual period?\"      No    Protocols used: BLOOD PRESSURE - HIGH-A-AH      "

## 2023-01-05 NOTE — PROGRESS NOTES
"  Assessment & Plan     1. Mild episode of recurrent major depressive disorder (H)  No changes to current medication, patient feels she is doing well.  Follow-up in 3-6 months, sooner as needed.  - buPROPion (WELLBUTRIN XL) 150 MG 24 hr tablet; Take 1 tablet (150 mg) by mouth every morning  Dispense: 90 tablet; Refill: 1        BMI:   Estimated body mass index is 27.3 kg/m  as calculated from the following:    Height as of this encounter: 1.6 m (5' 3\").    Weight as of this encounter: 69.9 kg (154 lb 1.6 oz).     Return in about 6 months (around 7/6/2023) for Chronic Disease Management, Medication review.    Mary Rankin MD  [facility] [place] EMELYN Ramos is a 37 year old presenting for the following health issues:  Depression and Hypertension      HPI     Depression Followup    How are you doing with your depression since your last visit? No change    Are you having other symptoms that might be associated with depression? No    Have you had a significant life event?  No     Are you feeling anxious or having panic attacks?   No    Do you have any concerns with your use of alcohol or other drugs? No    Social History     Tobacco Use     Smoking status: Never     Smokeless tobacco: Never   Substance Use Topics     Alcohol use: Not Currently     Alcohol/week: 0.0 standard drinks     Comment: rare     Drug use: No     PHQ 9/16/2021 5/27/2022 1/6/2023   PHQ-9 Total Score 0 0 0   Q9: Thoughts of better off dead/self-harm past 2 weeks Not at all Not at all Not at all     JOHANA-7 SCORE 9/16/2021 5/27/2022 1/6/2023   Total Score - 0 (minimal anxiety) -   Total Score 0 0 0     Last PHQ-9 1/6/2023   1.  Little interest or pleasure in doing things 0   2.  Feeling down, depressed, or hopeless 0   3.  Trouble falling or staying asleep, or sleeping too much 0   4.  Feeling tired or having little energy 0   5.  Poor appetite or overeating 0   6.  Feeling bad about yourself 0   7.  Trouble " "concentrating 0   8.  Moving slowly or restless 0   Q9: Thoughts of better off dead/self-harm past 2 weeks 0   PHQ-9 Total Score 0   Difficulty at work, home, or with people Not difficult at all     JOHANA-7  1/6/2023   1. Feeling nervous, anxious, or on edge 0   2. Not being able to stop or control worrying 0   3. Worrying too much about different things 0   4. Trouble relaxing 0   5. Being so restless that it is hard to sit still 0   6. Becoming easily annoyed or irritable 0   7. Feeling afraid, as if something awful might happen 0   JOHANA-7 Total Score 0   If you checked any problems, how difficult have they made it for you to do your work, take care of things at home, or get along with other people? Not difficult at all       Suicide Assessment Five-step Evaluation and Treatment (SAFE-T)      Hypertension Follow-up      Do you check your blood pressure regularly outside of the clinic? Yes     Are you following a low salt diet? No    Are your blood pressures ever more than 140 on the top number (systolic) OR more   than 90 on the bottom number (diastolic), for example 140/90? Yes  Patient has mildly elevated BP after delivery of her twins, which has since resolved.      Review of Systems   Constitutional, HEENT, cardiovascular, pulmonary, gi and gu systems are negative, except as otherwise noted.      Objective    /78 (BP Location: Left arm, Patient Position: Sitting, Cuff Size: Adult Regular)   Pulse 90   Temp 98.2  F (36.8  C) (Tympanic)   Resp 16   Ht 1.6 m (5' 3\")   Wt 69.9 kg (154 lb 1.6 oz)   LMP 12/23/2022 (Approximate)   SpO2 98%   BMI 27.30 kg/m    Body mass index is 27.3 kg/m .  Physical Exam   GENERAL: healthy, alert and no distress  PSYCH: mentation appears normal, affect normal/bright                "

## 2023-01-06 ENCOUNTER — OFFICE VISIT (OUTPATIENT)
Dept: FAMILY MEDICINE | Facility: OTHER | Age: 38
End: 2023-01-06
Attending: FAMILY MEDICINE
Payer: COMMERCIAL

## 2023-01-06 VITALS
HEIGHT: 63 IN | OXYGEN SATURATION: 98 % | HEART RATE: 90 BPM | RESPIRATION RATE: 16 BRPM | BODY MASS INDEX: 27.3 KG/M2 | TEMPERATURE: 98.2 F | SYSTOLIC BLOOD PRESSURE: 130 MMHG | DIASTOLIC BLOOD PRESSURE: 78 MMHG | WEIGHT: 154.1 LBS

## 2023-01-06 DIAGNOSIS — F33.0 MILD EPISODE OF RECURRENT MAJOR DEPRESSIVE DISORDER (H): Primary | ICD-10-CM

## 2023-01-06 PROCEDURE — 99214 OFFICE O/P EST MOD 30 MIN: CPT | Performed by: FAMILY MEDICINE

## 2023-01-06 RX ORDER — BUPROPION HYDROCHLORIDE 150 MG/1
150 TABLET ORAL EVERY MORNING
Qty: 90 TABLET | Refills: 1 | Status: SHIPPED | OUTPATIENT
Start: 2023-01-06 | End: 2023-02-13

## 2023-01-06 ASSESSMENT — ANXIETY QUESTIONNAIRES
4. TROUBLE RELAXING: NOT AT ALL
GAD7 TOTAL SCORE: 0
2. NOT BEING ABLE TO STOP OR CONTROL WORRYING: NOT AT ALL
3. WORRYING TOO MUCH ABOUT DIFFERENT THINGS: NOT AT ALL
6. BECOMING EASILY ANNOYED OR IRRITABLE: NOT AT ALL
GAD7 TOTAL SCORE: 0
7. FEELING AFRAID AS IF SOMETHING AWFUL MIGHT HAPPEN: NOT AT ALL
5. BEING SO RESTLESS THAT IT IS HARD TO SIT STILL: NOT AT ALL
1. FEELING NERVOUS, ANXIOUS, OR ON EDGE: NOT AT ALL
IF YOU CHECKED OFF ANY PROBLEMS ON THIS QUESTIONNAIRE, HOW DIFFICULT HAVE THESE PROBLEMS MADE IT FOR YOU TO DO YOUR WORK, TAKE CARE OF THINGS AT HOME, OR GET ALONG WITH OTHER PEOPLE: NOT DIFFICULT AT ALL

## 2023-01-06 ASSESSMENT — PATIENT HEALTH QUESTIONNAIRE - PHQ9: SUM OF ALL RESPONSES TO PHQ QUESTIONS 1-9: 0

## 2023-01-06 ASSESSMENT — PAIN SCALES - GENERAL: PAINLEVEL: NO PAIN (0)

## 2023-02-11 DIAGNOSIS — F33.0 MILD EPISODE OF RECURRENT MAJOR DEPRESSIVE DISORDER (H): ICD-10-CM

## 2023-02-13 RX ORDER — BUPROPION HYDROCHLORIDE 150 MG/1
TABLET ORAL
Qty: 30 TABLET | Refills: 0 | Status: ON HOLD | OUTPATIENT
Start: 2023-02-13 | End: 2023-08-24

## 2023-04-20 ENCOUNTER — E-VISIT (OUTPATIENT)
Dept: FAMILY MEDICINE | Facility: OTHER | Age: 38
End: 2023-04-20
Attending: FAMILY MEDICINE
Payer: COMMERCIAL

## 2023-04-20 DIAGNOSIS — R68.89 EAR SYMPTOM: Primary | ICD-10-CM

## 2023-04-20 PROCEDURE — 99207 PR NON-BILLABLE SERV PER CHARTING: CPT | Performed by: FAMILY MEDICINE

## 2023-04-20 NOTE — PATIENT INSTRUCTIONS
Thank you for choosing us for your care. This is not an uncommon symptoms when you are having a little congestion.  The eustachian tubes will block up a bit (the tube that regulate pressure from behind your eardrum to the back of the nose/throat area) and you will basically hear your heart beat in your ear.  I would recommend an antihistamine like Claritin or Zyrtec to help with the congestion.      Mary Rankin MD

## 2023-04-20 NOTE — TELEPHONE ENCOUNTER
Provider E-Visit time total (minutes): 4    ELECTRONIC VISIT DOCUMENTATION:    SUBJECTIVE:  Note above accurately captures the substance of my conversation with the patient.    ASSESSMENT/ PLAN:  1. Ear symptom  Instructions given.    Mary Rankin MD

## 2023-04-22 ENCOUNTER — HEALTH MAINTENANCE LETTER (OUTPATIENT)
Age: 38
End: 2023-04-22

## 2023-04-24 DIAGNOSIS — F33.0 MILD EPISODE OF RECURRENT MAJOR DEPRESSIVE DISORDER (H): ICD-10-CM

## 2023-04-25 RX ORDER — SERTRALINE HYDROCHLORIDE 100 MG/1
TABLET, FILM COATED ORAL
Qty: 30 TABLET | Refills: 2 | Status: SHIPPED | OUTPATIENT
Start: 2023-04-25 | End: 2023-06-06

## 2023-04-25 NOTE — TELEPHONE ENCOUNTER
zoloft      Last Written Prescription Date:  10/14/22  Last Fill Quantity: 90,   # refills: 1  Last Office Visit: 1/6/23  Future Office visit:

## 2023-07-07 ENCOUNTER — OFFICE VISIT (OUTPATIENT)
Dept: FAMILY MEDICINE | Facility: OTHER | Age: 38
End: 2023-07-07
Attending: FAMILY MEDICINE
Payer: COMMERCIAL

## 2023-07-07 VITALS
DIASTOLIC BLOOD PRESSURE: 78 MMHG | HEART RATE: 82 BPM | WEIGHT: 156.4 LBS | BODY MASS INDEX: 27.71 KG/M2 | SYSTOLIC BLOOD PRESSURE: 126 MMHG | OXYGEN SATURATION: 99 % | HEIGHT: 63 IN | RESPIRATION RATE: 16 BRPM | TEMPERATURE: 99.1 F

## 2023-07-07 DIAGNOSIS — D22.9 CHANGE IN MOLE: Primary | ICD-10-CM

## 2023-07-07 PROCEDURE — 99213 OFFICE O/P EST LOW 20 MIN: CPT | Performed by: FAMILY MEDICINE

## 2023-07-07 ASSESSMENT — ANXIETY QUESTIONNAIRES
1. FEELING NERVOUS, ANXIOUS, OR ON EDGE: NOT AT ALL
2. NOT BEING ABLE TO STOP OR CONTROL WORRYING: NOT AT ALL
6. BECOMING EASILY ANNOYED OR IRRITABLE: NOT AT ALL
7. FEELING AFRAID AS IF SOMETHING AWFUL MIGHT HAPPEN: NOT AT ALL
GAD7 TOTAL SCORE: 0
4. TROUBLE RELAXING: NOT AT ALL
5. BEING SO RESTLESS THAT IT IS HARD TO SIT STILL: NOT AT ALL
IF YOU CHECKED OFF ANY PROBLEMS ON THIS QUESTIONNAIRE, HOW DIFFICULT HAVE THESE PROBLEMS MADE IT FOR YOU TO DO YOUR WORK, TAKE CARE OF THINGS AT HOME, OR GET ALONG WITH OTHER PEOPLE: NOT DIFFICULT AT ALL
3. WORRYING TOO MUCH ABOUT DIFFERENT THINGS: NOT AT ALL
GAD7 TOTAL SCORE: 0

## 2023-07-07 ASSESSMENT — PATIENT HEALTH QUESTIONNAIRE - PHQ9
10. IF YOU CHECKED OFF ANY PROBLEMS, HOW DIFFICULT HAVE THESE PROBLEMS MADE IT FOR YOU TO DO YOUR WORK, TAKE CARE OF THINGS AT HOME, OR GET ALONG WITH OTHER PEOPLE: NOT DIFFICULT AT ALL
SUM OF ALL RESPONSES TO PHQ QUESTIONS 1-9: 0
SUM OF ALL RESPONSES TO PHQ QUESTIONS 1-9: 0

## 2023-07-07 ASSESSMENT — PAIN SCALES - GENERAL: PAINLEVEL: NO PAIN (0)

## 2023-07-07 NOTE — PROGRESS NOTES
"  Assessment & Plan     1. Change in mole  Will schedule excision        BMI:   Estimated body mass index is 27.71 kg/m  as calculated from the following:    Height as of this encounter: 1.6 m (5' 3\").    Weight as of this encounter: 70.9 kg (156 lb 6.4 oz).     Return if symptoms worsen or fail to improve.    Mary Rankin MD  [facility] - ALBERTO Ramos is a 37 year old, presenting for the following health issues:  Derm Problem      HPI     Concern - Mole    Onset: noticed about last year   Description: left thigh brown in color with uneven edges.   Intensity: mild  Progression of Symptoms:  Sametimes scabs, rough, scaly   Accompanying Signs & Symptoms: pushing on it it hurts   Previous history of similar problem:   Precipitating factors:        Worsened by:   Alleviating factors:        Improved by:   Therapies tried and outcome: none         Review of Systems   Constitutional, HEENT, cardiovascular, pulmonary, gi and gu systems are negative, except as otherwise noted.      Objective    /78 (BP Location: Right arm, Patient Position: Sitting)   Pulse 82   Temp 99.1  F (37.3  C)   Resp 16   Ht 1.6 m (5' 3\")   Wt 70.9 kg (156 lb 6.4 oz)   LMP 06/23/2023 (Approximate)   SpO2 99%   BMI 27.71 kg/m    Body mass index is 27.71 kg/m .  Physical Exam   GENERAL: healthy, alert and no distress  SKIN: about 6 mm irregularly colored brown mole, smooth borders  PSYCH: mentation appears normal, affect normal/bright                "

## 2023-07-26 NOTE — PROGRESS NOTES
{PROVIDER CHARTING PREFERENCE:677171}    Subjective   Rachel is a 37 year old, presenting for the following health issues:  No chief complaint on file.  {(!) Visit Details have not yet been documented.  Please enter Visit Details and then use this list to pull in documentation. (Optional):385818}    HPI   SUBJECTIVE:   Rachel Carvajal is a 37 year old female who presents for lesion removal. We have already discussed this procedure, including option of not performing surgery, technique of surgery and potential for scarring at a recent visit.    OBJECTIVE:   Patient appears well. Vitals are normal.  Skin: ***    ASSESSMENT:   {LESION DX:5286}    PLAN:   After informed consent was obtained, using Betadine for cleansing and 1% Lidocaine {w-w/o:5700} epinephrine for anesthetic, with sterile technique, {PROCEDURE:5287} was performed. Antibiotic dressing is applied, and wound care instructions provided.  Be alert for any signs of cutaneous infection. The procedure was well tolerated without complications. Follow up: {FOLLOW UP:5288}.   {SUPERLIST (Optional):293885}  {additonal problems for provider to add (Optional):154432}      Review of Systems   {ROS COMP (Optional):741635}      Objective    LMP 06/23/2023 (Approximate)   There is no height or weight on file to calculate BMI.  Physical Exam   {Exam List (Optional):073758}    {Diagnostic Test Results (Optional):796986}    {AMBULATORY ATTESTATION (Optional):039203}

## 2023-07-27 ENCOUNTER — OFFICE VISIT (OUTPATIENT)
Dept: FAMILY MEDICINE | Facility: OTHER | Age: 38
End: 2023-07-27
Attending: FAMILY MEDICINE
Payer: COMMERCIAL

## 2023-07-27 VITALS
DIASTOLIC BLOOD PRESSURE: 90 MMHG | TEMPERATURE: 98.5 F | OXYGEN SATURATION: 100 % | HEIGHT: 63 IN | HEART RATE: 80 BPM | WEIGHT: 157 LBS | BODY MASS INDEX: 27.82 KG/M2 | SYSTOLIC BLOOD PRESSURE: 125 MMHG

## 2023-07-27 DIAGNOSIS — L81.9 CHANGING PIGMENTED SKIN LESION: Primary | ICD-10-CM

## 2023-07-27 PROCEDURE — 99000 SPECIMEN HANDLING OFFICE-LAB: CPT | Performed by: FAMILY MEDICINE

## 2023-07-27 PROCEDURE — 88305 TISSUE EXAM BY PATHOLOGIST: CPT | Mod: 26 | Performed by: PATHOLOGY

## 2023-07-27 PROCEDURE — 88341 IMHCHEM/IMCYTCHM EA ADD ANTB: CPT | Mod: TC | Performed by: FAMILY MEDICINE

## 2023-07-27 PROCEDURE — 88342 IMHCHEM/IMCYTCHM 1ST ANTB: CPT | Mod: 26 | Performed by: PATHOLOGY

## 2023-07-27 PROCEDURE — 88341 IMHCHEM/IMCYTCHM EA ADD ANTB: CPT | Mod: 26 | Performed by: PATHOLOGY

## 2023-07-27 PROCEDURE — 11402 EXC TR-EXT B9+MARG 1.1-2 CM: CPT | Performed by: FAMILY MEDICINE

## 2023-07-27 ASSESSMENT — PAIN SCALES - GENERAL: PAINLEVEL: NO PAIN (0)

## 2023-07-27 NOTE — PROGRESS NOTES
HPI   SUBJECTIVE:   Rachel Carvajal is a 37 year old female who presents for lesion removal. We have already discussed this procedure, including option of not performing surgery, technique of surgery and potential for scarring at a recent visit.  Patient wishes for pigmented lesion to be removed as it is growing and changing     OBJECTIVE:   Patient appears well. Vitals are normal.  Skin: pigmented lesion approximately 9 mm in size, anterior left thigh     ASSESSMENT:   atypical nevus     PLAN:   After informed consent was obtained, using Betadine for cleansing and 1% Lidocaine without epinephrine for anesthetic, with sterile technique, elliptical excision in total was performed - incision of 1.8cm.  Incision is then closed with 5 interrupted sutures of 4-0 Ethilon.  Dressing is applied, and wound care instructions provided.  Be alert for any signs of cutaneous infection. The procedure was well tolerated without complications. Follow up: The specimen is labelled and sent to pathology for evaluation., Return for suture removal in 8 days.    Mary Rankin MD

## 2023-08-04 DIAGNOSIS — C43.72 MALIGNANT MELANOMA OF LEFT LOWER EXTREMITY (H): Primary | ICD-10-CM

## 2023-08-04 LAB
PATH REPORT.COMMENTS IMP SPEC: ABNORMAL
PATH REPORT.COMMENTS IMP SPEC: YES
PATH REPORT.FINAL DX SPEC: ABNORMAL
PATH REPORT.GROSS SPEC: ABNORMAL
PATH REPORT.MICROSCOPIC SPEC OTHER STN: ABNORMAL
PATH REPORT.RELEVANT HX SPEC: ABNORMAL

## 2023-08-09 ENCOUNTER — ONCOLOGY VISIT (OUTPATIENT)
Dept: ONCOLOGY | Facility: OTHER | Age: 38
End: 2023-08-09
Attending: INTERNAL MEDICINE
Payer: COMMERCIAL

## 2023-08-09 VITALS
BODY MASS INDEX: 27.46 KG/M2 | TEMPERATURE: 98.1 F | HEART RATE: 87 BPM | SYSTOLIC BLOOD PRESSURE: 118 MMHG | DIASTOLIC BLOOD PRESSURE: 74 MMHG | WEIGHT: 154.98 LBS | OXYGEN SATURATION: 98 % | HEIGHT: 63 IN

## 2023-08-09 DIAGNOSIS — C43.9 MELANOMA OF SKIN (H): Primary | ICD-10-CM

## 2023-08-09 PROCEDURE — 99205 OFFICE O/P NEW HI 60 MIN: CPT | Performed by: INTERNAL MEDICINE

## 2023-08-09 ASSESSMENT — PATIENT HEALTH QUESTIONNAIRE - PHQ9: SUM OF ALL RESPONSES TO PHQ QUESTIONS 1-9: 0

## 2023-08-09 ASSESSMENT — PAIN SCALES - GENERAL: PAINLEVEL: NO PAIN (0)

## 2023-08-09 NOTE — PROGRESS NOTES
MEDICAL ONCOLOGY CONSULT NOTE  Aug 9, 2023    Reason for consult: Melanoma    Referring Provider: Dr. Monterroso.     HISTORY OF PRESENT ILLNESS  Rachel Carvajal is a 37 year old female with PMH as stated below who is seen in the oncology clinic for consultation for melanoma.     Her history in short is as follows:    Ms. Carvajal noticed a mole on the anterior lateral aspect of her right thigh near the knee, change over the last year.  She brought it to the attention of her primary care who did a excisional biopsy.    7/27/2023: Excisional biopsy: Malignant melanoma superficial spreading type, Breslow depth 0.8 mm, narrowly excised in planes examined.  Ulceration absent.  Lymphovascular invasion absent.  Roshan's level 3.  pT1b    She is overall doing well.  She denies any prior history of skin cancer.  Denies any enlarged nodes.  Denies any weight loss or appetite loss.  Denies any pain anywhere.    REVIEW OF SYSTEMS  A 12-point ROS negative except as in HPI      Current Outpatient Medications   Medication Sig Dispense Refill    buPROPion (WELLBUTRIN XL) 150 MG 24 hr tablet TAKE 1 TABLET(150 MG) BY MOUTH EVERY MORNING 30 tablet 0    sertraline (ZOLOFT) 50 MG tablet Take 1 tablet (50 mg) by mouth daily 30 tablet 2       Allergies   Allergen Reactions    Penicillins Rash     Immunization History   Administered Date(s) Administered    COVID-19 Monovalent 18+ (Moderna) 09/22/2021, 11/12/2021    Influenza (IIV3) PF 12/21/2011    Influenza Vaccine >6 months (Alfuria,Fluzone) 09/29/2017, 01/26/2021    TDAP (Adacel,Boostrix) 06/03/2021    TDAP Vaccine (Adacel) 09/08/2017    TDAP Vaccine (Boostrix) 06/26/2012       Past Medical History:   Diagnosis Date    Acute appendicitis with localized peritonitis 06/09/2018    Dichorionic diamniotic twin pregnancy 01/14/2021    AMA  DNA negative  Hx of genital HSV  Initiate suppression therapy @ 31 w Gestational HTN Depression Elective sterilization TWINS  Di/Di  Girl/Boy Failed 1  hr GTT.  Passed 3 hr GTT  WEEKLY  US for BPP with Doppler.  Also weekly NST.  ECHO normal for both fetus 1 and 2 NOTE:  Normal growth for Twin A (1) on 6/16/21 7/15/21:  Dopplers normal for both  EFW:  Twin A 5th %tile  Twin B 46th%tile Rh posi    Elderly multigravida with antepartum condition or complication 2021    Gestational hypertension, third trimester 2021    Glucose intolerance of pregnancy 2021    Failed screen, Normal 3 hour GTT    Herpes simplex infection of genitourinary system 2017    Mild episode of recurrent major depressive disorder (H) 2016    Pre-eclampsia in third trimester 2021       Past Surgical History:   Procedure Laterality Date    COMBINED  SECTION, SALPINGECTOMY BILATERAL Bilateral 2021    Procedure:  SECTION, WITH BILATERAL SALPINGECTOMY;  Surgeon: James Lenz MD;  Location: [facility]    ENT SURGERY      TONSILECTOMY    GYN SURGERY      invetro fertilization    LAPAROSCOPIC APPENDECTOMY  06/10/2018    [facility]       SOCIAL HISTORY  History   Smoking Status    Never   Smokeless Tobacco    Never    Social History    Substance and Sexual Activity      Alcohol use: Not Currently        Alcohol/week: 0.0 standard drinks of alcohol        Comment: rare     History   Drug Use No       FAMILY HISTORY  Family History   Problem Relation Age of Onset    Hypertension Mother     Other Cancer Maternal Grandfather     Diabetes Paternal Grandfather     Coronary Artery Disease No family hx of     Hyperlipidemia No family hx of     Cerebrovascular Disease No family hx of     Breast Cancer No family hx of     Colon Cancer No family hx of     Prostate Cancer No family hx of     Anesthesia Reaction No family hx of     Asthma No family hx of     Osteoporosis No family hx of     Genetic Disorder No family hx of     Thyroid Disease No family hx of        PHYSICAL EXAMINATION  /74   Pulse 87   Temp 98.1  F (36.7  C) (Tympanic)   Ht 1.6 m (5'  "3\")   Wt 70.3 kg (154 lb 15.7 oz)   LMP 06/23/2023 (Approximate)   SpO2 98%   BMI 27.45 kg/m    Wt Readings from Last 2 Encounters:   08/09/23 70.3 kg (154 lb 15.7 oz)   07/27/23 71.2 kg (157 lb)     Physical Exam  Constitutional:       Appearance: Normal appearance.   Cardiovascular:      Rate and Rhythm: Normal rate.   Pulmonary:      Effort: Pulmonary effort is normal.   Lymphadenopathy:      Lower Body: No left inguinal adenopathy.   Skin:     Comments: Healed area anterior left thigh where biopsy was done.   Neurological:      General: No focal deficit present.      Mental Status: She is alert and oriented to person, place, and time.   Psychiatric:         Mood and Affect: Mood normal.         Behavior: Behavior normal.       Laboratory and imaging:     Latest Reference Range & Units 03/21/22 14:28   WBC 4.0 - 11.0 10e3/uL 6.8   Hemoglobin 11.7 - 15.7 g/dL 12.3   Hematocrit 35.0 - 47.0 % 37.5   Platelet Count 150 - 450 10e3/uL 260     ASSESSMENT AND PLAN    1.  Malignant melanoma:    Status post excisional biopsy.  PT1b.  Discussed that she would need a wider excision.  She has also questions regarding if she would need a sentinel lymph node biopsy.  Calculated risk of sentinel node metastasis based on MSKCC melanoma nomogram is 3%.  Discussed at T1b it is not a category 1 recommendation and it would be a discussion regarding risk versus benefit with surgery.    Currently no imaging or labs recommended based on NCCN guidelines.  Will wait for final path after wide excision for further recommendations.  I will also place a referral to dermatology for ongoing skin surveillance.    I would recommend follow-up back in clinic once she has had the wide excision to go over the pathology report and will make further recommendations after that.    Total time spent on the patient on day of encounter was 60 minutes doing chart review, review of test results, interpretation of results, patient visit and documentation.  "      Jessica Perry MD

## 2023-08-09 NOTE — NURSING NOTE
"Oncology Rooming Note    August 9, 2023 10:01 AM   Rachel Carvajal is a 37 year old female who presents for:    Chief Complaint   Patient presents with    Oncology Clinic Visit     New consult. Malignant melanoma of left lower extremity      Initial Vitals: /74   Pulse 87   Temp 98.1  F (36.7  C) (Tympanic)   Ht 1.6 m (5' 3\")   Wt 70.3 kg (154 lb 15.7 oz)   LMP 06/23/2023 (Approximate)   SpO2 98%   BMI 27.45 kg/m   Estimated body mass index is 27.45 kg/m  as calculated from the following:    Height as of this encounter: 1.6 m (5' 3\").    Weight as of this encounter: 70.3 kg (154 lb 15.7 oz). Body surface area is 1.77 meters squared.  No Pain (0) Comment: Data Unavailable   Patient's last menstrual period was 06/23/2023 (approximate).  Allergies reviewed: Yes  Medications reviewed: Yes    Medications: Medication refills not needed today.  Pharmacy name entered into [facility]:    [id number] - [place] [id number] AT [facility] [address]  [place] [id number] [place] [facility] - [address]    Clinical concerns: none       Magda Lucas LPN              "

## 2023-08-16 ENCOUNTER — PREP FOR PROCEDURE (OUTPATIENT)
Dept: SURGERY | Facility: OTHER | Age: 38
End: 2023-08-16

## 2023-08-16 ENCOUNTER — OFFICE VISIT (OUTPATIENT)
Dept: SURGERY | Facility: OTHER | Age: 38
End: 2023-08-16
Attending: NURSE PRACTITIONER
Payer: COMMERCIAL

## 2023-08-16 VITALS
HEIGHT: 63 IN | SYSTOLIC BLOOD PRESSURE: 122 MMHG | OXYGEN SATURATION: 100 % | RESPIRATION RATE: 14 BRPM | WEIGHT: 155 LBS | DIASTOLIC BLOOD PRESSURE: 80 MMHG | BODY MASS INDEX: 27.46 KG/M2 | HEART RATE: 68 BPM

## 2023-08-16 DIAGNOSIS — C43.9 MELANOMA OF SKIN (H): Primary | ICD-10-CM

## 2023-08-16 DIAGNOSIS — C43.72 MALIGNANT MELANOMA OF LEFT LOWER EXTREMITY (H): Primary | ICD-10-CM

## 2023-08-16 PROCEDURE — 99213 OFFICE O/P EST LOW 20 MIN: CPT | Performed by: NURSE PRACTITIONER

## 2023-08-16 ASSESSMENT — PAIN SCALES - GENERAL: PAINLEVEL: NO PAIN (0)

## 2023-08-16 NOTE — PROGRESS NOTES
CLINIC NOTE - CONSULT  2023    Patient:Rachel Carvajal    Referring Physician: Mary Sim MD    Reason for Referral: Melanoma left lower extremity    This is a 37 year old female with melanoma on the left lower extremity.      It has been biopsied and proven to be melanoma.  She needs further excision of the area.  She has seen oncology and is scheduled to see dermatology tomorrow.    Past Medical History:  Past Medical History:   Diagnosis Date    Acute appendicitis with localized peritonitis 2018    Dichorionic diamniotic twin pregnancy 2021    AMA  DNA negative  Hx of genital HSV  Initiate suppression therapy @ 31 w Gestational HTN Depression Elective sterilization TWINS  Di/Di  Girl/Boy Failed 1 hr GTT.  Passed 3 hr GTT  WEEKLY  US for BPP with Doppler.  Also weekly NST.  ECHO normal for both fetus 1 and 2 NOTE:  Normal growth for Twin A (1) on 6/16/21 7/15/21:  Dopplers normal for both  EFW:  Twin A 5th %tile  Twin B 46th%tile Rh posi    Elderly multigravida with antepartum condition or complication 2021    Gestational hypertension, third trimester 2021    Glucose intolerance of pregnancy 2021    Failed screen, Normal 3 hour GTT    Herpes simplex infection of genitourinary system 2017    Mild episode of recurrent major depressive disorder (H) 2016    Pre-eclampsia in third trimester 2021       Past Surgical History:  Past Surgical History:   Procedure Laterality Date    COMBINED  SECTION, SALPINGECTOMY BILATERAL Bilateral 2021    Procedure:  SECTION, WITH BILATERAL SALPINGECTOMY;  Surgeon: James Lenz MD;  Location: [facility]    ENT SURGERY      TONSILECTOMY    GYN SURGERY      invetro fertilization    LAPAROSCOPIC APPENDECTOMY  06/10/2018    [facility]       Family History History:  Family History   Problem Relation Age of Onset    Hypertension Mother     Other Cancer Maternal Grandfather     Diabetes Paternal Grandfather   "   Coronary Artery Disease No family hx of     Hyperlipidemia No family hx of     Cerebrovascular Disease No family hx of     Breast Cancer No family hx of     Colon Cancer No family hx of     Prostate Cancer No family hx of     Anesthesia Reaction No family hx of     Asthma No family hx of     Osteoporosis No family hx of     Genetic Disorder No family hx of     Thyroid Disease No family hx of        History of Tobacco Use:  History   Smoking Status    Never   Smokeless Tobacco    Never       Current Medications:  Current Outpatient Medications   Medication Sig Dispense Refill    buPROPion (WELLBUTRIN XL) 150 MG 24 hr tablet TAKE 1 TABLET(150 MG) BY MOUTH EVERY MORNING 30 tablet 0    sertraline (ZOLOFT) 50 MG tablet Take 1 tablet (50 mg) by mouth daily 30 tablet 2       Allergies:  Allergies   Allergen Reactions    Penicillins Rash       ROS:  Constitutional: negative  Eyes: negative  Ears, nose, mouth, throat, and face: negative  Respiratory: negative  Cardiovascular: negative  Gastrointestinal: negative  Genitourinary:negative  Integument/breast: positive for melanoma  Hematologic/lymphatic: negative  Musculoskeletal: negative  Neurological: negative  Behavioral/Psych: negative  Endocrine: negative  Allergic/Immunologic: negative    PHYSICAL EXAM:     Vital signs: /80 (BP Location: Right arm, Cuff Size: Adult Regular)   Pulse 68   Resp 14   Ht 1.6 m (5' 3\")   Wt 70.3 kg (155 lb)   LMP 06/23/2023 (Approximate)   SpO2 100%   BMI 27.46 kg/m     BMI: Body mass index is 27.46 kg/m .   General: Normal, healthy, cooperative, in no acute distress, alert   Skin: no rashes   Lungs: clear to auscultation   CV: Regular rate and rhythm   Abdominal: non-distended, soft, non-tender to palpation   Extremities: No cyanosis, clubbing or edema noted bilaterally in Upper and Lower Extremities   Neurological: without deficit    ASSESSMENT: 37 year old female with melanoma on the left lower extremity.    PLAN: Discussed " options with the patient.  Will plan on taking the patient to the OR for surgical excision.

## 2023-08-16 NOTE — H&P (VIEW-ONLY)
CLINIC NOTE - CONSULT  2023    Patient:Rachel Carvajal    Referring Physician: Mary Sim MD    Reason for Referral: Melanoma left lower extremity    This is a 37 year old female with melanoma on the left lower extremity.      It has been biopsied and proven to be melanoma.  She needs further excision of the area.  She has seen oncology and is scheduled to see dermatology tomorrow.    Past Medical History:  Past Medical History:   Diagnosis Date    Acute appendicitis with localized peritonitis 2018    Dichorionic diamniotic twin pregnancy 2021    AMA  DNA negative  Hx of genital HSV  Initiate suppression therapy @ 31 w Gestational HTN Depression Elective sterilization TWINS  Di/Di  Girl/Boy Failed 1 hr GTT.  Passed 3 hr GTT  WEEKLY  US for BPP with Doppler.  Also weekly NST.  ECHO normal for both fetus 1 and 2 NOTE:  Normal growth for Twin A (1) on 6/16/21 7/15/21:  Dopplers normal for both  EFW:  Twin A 5th %tile  Twin B 46th%tile Rh posi    Elderly multigravida with antepartum condition or complication 2021    Gestational hypertension, third trimester 2021    Glucose intolerance of pregnancy 2021    Failed screen, Normal 3 hour GTT    Herpes simplex infection of genitourinary system 2017    Mild episode of recurrent major depressive disorder (H) 2016    Pre-eclampsia in third trimester 2021       Past Surgical History:  Past Surgical History:   Procedure Laterality Date    COMBINED  SECTION, SALPINGECTOMY BILATERAL Bilateral 2021    Procedure:  SECTION, WITH BILATERAL SALPINGECTOMY;  Surgeon: James Lenz MD;  Location: [facility]    ENT SURGERY      TONSILECTOMY    GYN SURGERY      invetro fertilization    LAPAROSCOPIC APPENDECTOMY  06/10/2018    [facility]       Family History History:  Family History   Problem Relation Age of Onset    Hypertension Mother     Other Cancer Maternal Grandfather     Diabetes Paternal Grandfather   "   Coronary Artery Disease No family hx of     Hyperlipidemia No family hx of     Cerebrovascular Disease No family hx of     Breast Cancer No family hx of     Colon Cancer No family hx of     Prostate Cancer No family hx of     Anesthesia Reaction No family hx of     Asthma No family hx of     Osteoporosis No family hx of     Genetic Disorder No family hx of     Thyroid Disease No family hx of        History of Tobacco Use:  History   Smoking Status    Never   Smokeless Tobacco    Never       Current Medications:  Current Outpatient Medications   Medication Sig Dispense Refill    buPROPion (WELLBUTRIN XL) 150 MG 24 hr tablet TAKE 1 TABLET(150 MG) BY MOUTH EVERY MORNING 30 tablet 0    sertraline (ZOLOFT) 50 MG tablet Take 1 tablet (50 mg) by mouth daily 30 tablet 2       Allergies:  Allergies   Allergen Reactions    Penicillins Rash       ROS:  Constitutional: negative  Eyes: negative  Ears, nose, mouth, throat, and face: negative  Respiratory: negative  Cardiovascular: negative  Gastrointestinal: negative  Genitourinary:negative  Integument/breast: positive for melanoma  Hematologic/lymphatic: negative  Musculoskeletal: negative  Neurological: negative  Behavioral/Psych: negative  Endocrine: negative  Allergic/Immunologic: negative    PHYSICAL EXAM:     Vital signs: /80 (BP Location: Right arm, Cuff Size: Adult Regular)   Pulse 68   Resp 14   Ht 1.6 m (5' 3\")   Wt 70.3 kg (155 lb)   LMP 06/23/2023 (Approximate)   SpO2 100%   BMI 27.46 kg/m     BMI: Body mass index is 27.46 kg/m .   General: Normal, healthy, cooperative, in no acute distress, alert   Skin: no rashes   Lungs: clear to auscultation   CV: Regular rate and rhythm   Abdominal: non-distended, soft, non-tender to palpation   Extremities: No cyanosis, clubbing or edema noted bilaterally in Upper and Lower Extremities   Neurological: without deficit    ASSESSMENT: 37 year old female with melanoma on the left lower extremity.    PLAN: Discussed " options with the patient.  Will plan on taking the patient to the OR for surgical excision.

## 2023-08-17 ENCOUNTER — TRANSFERRED RECORDS (OUTPATIENT)
Dept: HEALTH INFORMATION MANAGEMENT | Facility: CLINIC | Age: 38
End: 2023-08-17

## 2023-08-17 DIAGNOSIS — C43.9 MELANOMA OF SKIN (H): Primary | ICD-10-CM

## 2023-08-17 NOTE — PATIENT INSTRUCTIONS
Thank you for allowing Muriel Saravia CNP and our surgical team to participate in your care. Please call our health unit coordinator at [phone] with scheduling questions or the nurse at [phone] with any other questions or concerns.      You have been scheduled for: SENTINEL NODE BIOPSY// EXCISION MELANOMA LEFT LOWER EXTREMITY ABOVE THE KNEE  with  on AUGUST 24TH.     Please see handout for additional instruction.    You WILL NOT need a pre-operative appointment with your primary care provider.  You may call [phone] or [phone] with any questions.

## 2023-08-21 ENCOUNTER — ANESTHESIA EVENT (OUTPATIENT)
Dept: SURGERY | Facility: HOSPITAL | Age: 38
End: 2023-08-21
Payer: COMMERCIAL

## 2023-08-21 NOTE — ANESTHESIA PREPROCEDURE EVALUATION
Anesthesia Pre-Procedure Evaluation    Patient: Rachel Carvajal   MRN: [id number] : 1985        Procedure : Procedure(s):  Wide Local Excision Left Anterior Thigh, with Left Groin [place] lymph node biopsy          Past Medical History:   Diagnosis Date     Acute appendicitis with localized peritonitis 2018     Dichorionic diamniotic twin pregnancy 2021    AMA  DNA negative  Hx of genital HSV  Initiate suppression therapy @ 31 w Gestational HTN Depression Elective sterilization TWINS  Di/Di  Girl/Boy Failed 1 hr GTT.  Passed 3 hr GTT  WEEKLY  US for BPP with Doppler.  Also weekly NST.  ECHO normal for both fetus 1 and 2 NOTE:  Normal growth for Twin A (1) on 6/16/21 7/15/21:  Dopplers normal for both  EFW:  Twin A 5th %tile  Twin B 46th%tile Rh posi     Elderly multigravida with antepartum condition or complication 2021     Gestational hypertension, third trimester 2021     Glucose intolerance of pregnancy 2021    Failed screen, Normal 3 hour GTT     Herpes simplex infection of genitourinary system 2017     Mild episode of recurrent major depressive disorder (H) 2016     Pre-eclampsia in third trimester 2021      Past Surgical History:   Procedure Laterality Date     COMBINED  SECTION, SALPINGECTOMY BILATERAL Bilateral 2021    Procedure:  SECTION, WITH BILATERAL SALPINGECTOMY;  Surgeon: James Lenz MD;  Location: [facility]     ENT SURGERY      TONSILECTOMY     GYN SURGERY      invetro fertilization     LAPAROSCOPIC APPENDECTOMY  06/10/2018    [facility]      Allergies   Allergen Reactions     Penicillins Rash      Social History     Tobacco Use     Smoking status: Never     Smokeless tobacco: Never   Substance Use Topics     Alcohol use: Not Currently     Alcohol/week: 0.0 standard drinks of alcohol     Comment: rare      Wt Readings from Last 1 Encounters:   23 70.3 kg (155 lb)        Anesthesia Evaluation   Pt has had prior  anesthetic.     No history of anesthetic complications       ROS/MED HX  ENT/Pulmonary:  - neg pulmonary ROS     Neurologic:  - neg neurologic ROS     Cardiovascular:     (+)  hypertension-range: not on medicatons/ -   -  - -                                      METS/Exercise Tolerance:     Hematologic:  - neg hematologic  ROS     Musculoskeletal:  - neg musculoskeletal ROS     GI/Hepatic:  - neg GI/hepatic ROS     Renal/Genitourinary:  - neg Renal ROS     Endo:  - neg endo ROS     Psychiatric/Substance Use:     (+) psychiatric history depression       Infectious Disease: Comment: HSV      Malignancy:   (+) Malignancy, History of Skin.Skin CA Active status post.      Other:  - neg other ROS          Physical Exam    Airway        Mallampati: II   TM distance: > 3 FB   Neck ROM: full   Mouth opening: > 3 cm    Respiratory Devices and Support         Dental       (+) Completely normal teeth      Cardiovascular   cardiovascular exam normal       Rhythm and rate: regular and normal     Pulmonary   pulmonary exam normal        breath sounds clear to auscultation       OUTSIDE LABS:  CBC:   Lab Results   Component Value Date    WBC 6.8 03/21/2022    WBC 7.0 08/17/2021    HGB 12.3 03/21/2022    HGB 12.9 08/17/2021    HCT 37.5 03/21/2022    HCT 38.3 08/17/2021     03/21/2022     08/17/2021     BMP:   Lab Results   Component Value Date     08/17/2021     07/30/2021    POTASSIUM 4.1 08/17/2021    POTASSIUM 3.8 07/30/2021    CHLORIDE 107 08/17/2021    CHLORIDE 107 07/30/2021    CO2 26 08/17/2021    CO2 21 07/30/2021    BUN 18 08/17/2021    BUN 6 (L) 07/30/2021    CR 0.73 08/17/2021    CR 0.58 07/30/2021    GLC 85 08/17/2021    GLC 86 07/30/2021     COAGS: No results found for: PTT, INR, FIBR  POC:   Lab Results   Component Value Date    HCG Positive (A) 04/13/2017     HEPATIC:   Lab Results   Component Value Date    ALBUMIN 3.7 08/17/2021    PROTTOTAL 7.0 08/17/2021    ALT 25 08/17/2021    AST 21  08/17/2021    ALKPHOS 63 08/17/2021    BILITOTAL 0.5 08/17/2021     OTHER:   Lab Results   Component Value Date    A1C 5.2 06/10/2021    SHERRELL 8.9 08/17/2021    MAG 2.1 08/17/2021    TSH 1.09 03/21/2022    T4 0.79 03/21/2022       Anesthesia Plan    ASA Status:  2    NPO Status:  NPO Appropriate    Anesthesia Type: General.     - Airway: LMA   Induction: Intravenous.   Maintenance: Balanced.        Consents    Anesthesia Plan(s) and associated risks, benefits, and realistic alternatives discussed. Questions answered and patient/representative(s) expressed understanding.     - Discussed:     - Discussed with:  Patient      - Extended Intubation/Ventilatory Support Discussed: No.      - Patient is DNR/DNI Status: No     Use of blood products discussed: No .     Postoperative Care            Comments:    Other Comments: Manjit 8/16/23            RAVINDRA Jeffers CRNA

## 2023-08-22 DIAGNOSIS — F33.0 MILD EPISODE OF RECURRENT MAJOR DEPRESSIVE DISORDER (H): ICD-10-CM

## 2023-08-23 NOTE — TELEPHONE ENCOUNTER
Wellbutrin      Last Written Prescription Date:  02/13/23  Last Fill Quantity: 30,   # refills: 0  Last Office Visit: 07/27/23  Future Office visit:    Next 5 appointments (look out 90 days)      Sep 14, 2023  3:00 PM  (Arrive by 2:45 PM)  Return Visit with Jessica Perry MD  [facility] ([facility] ) [address] [place] [id number]  [phone]

## 2023-08-24 ENCOUNTER — ANESTHESIA (OUTPATIENT)
Dept: SURGERY | Facility: HOSPITAL | Age: 38
End: 2023-08-24
Payer: COMMERCIAL

## 2023-08-24 ENCOUNTER — APPOINTMENT (OUTPATIENT)
Dept: LAB | Facility: HOSPITAL | Age: 38
End: 2023-08-24
Attending: SURGERY
Payer: COMMERCIAL

## 2023-08-24 ENCOUNTER — HOSPITAL ENCOUNTER (OUTPATIENT)
Facility: HOSPITAL | Age: 38
Discharge: HOME | End: 2023-08-24
Attending: SURGERY | Admitting: SURGERY
Payer: COMMERCIAL

## 2023-08-24 ENCOUNTER — HOSPITAL ENCOUNTER (OUTPATIENT)
Dept: NUCLEAR MEDICINE | Facility: HOSPITAL | Age: 38
Discharge: HOME | End: 2023-08-24
Attending: SURGERY | Admitting: SURGERY
Payer: COMMERCIAL

## 2023-08-24 VITALS
BODY MASS INDEX: 27.29 KG/M2 | TEMPERATURE: 98.7 F | DIASTOLIC BLOOD PRESSURE: 95 MMHG | OXYGEN SATURATION: 98 % | WEIGHT: 154 LBS | RESPIRATION RATE: 16 BRPM | HEIGHT: 63 IN | SYSTOLIC BLOOD PRESSURE: 131 MMHG | HEART RATE: 83 BPM

## 2023-08-24 DIAGNOSIS — C43.9 MELANOMA OF SKIN (H): ICD-10-CM

## 2023-08-24 DIAGNOSIS — C43.9 MALIGNANT MELANOMA, UNSPECIFIED SITE (H): Primary | ICD-10-CM

## 2023-08-24 LAB — HCG UR QL: NEGATIVE

## 2023-08-24 PROCEDURE — 710N000010 HC RECOVERY PHASE 1, LEVEL 2, PER MIN: Performed by: SURGERY

## 2023-08-24 PROCEDURE — 250N000025 HC SEVOFLURANE, PER MIN: Performed by: SURGERY

## 2023-08-24 PROCEDURE — 258N000003 HC RX IP 258 OP 636: Performed by: NURSE ANESTHETIST, CERTIFIED REGISTERED

## 2023-08-24 PROCEDURE — 78195 LYMPH SYSTEM IMAGING: CPT

## 2023-08-24 PROCEDURE — 81025 URINE PREGNANCY TEST: CPT | Performed by: NURSE ANESTHETIST, CERTIFIED REGISTERED

## 2023-08-24 PROCEDURE — 38525 BIOPSY/REMOVAL LYMPH NODES: CPT | Mod: LT | Performed by: SURGERY

## 2023-08-24 PROCEDURE — 250N000013 HC RX MED GY IP 250 OP 250 PS 637: Performed by: SURGERY

## 2023-08-24 PROCEDURE — 11606 EXC TR-EXT MAL+MARG >4 CM: CPT | Mod: LT | Performed by: SURGERY

## 2023-08-24 PROCEDURE — 250N000011 HC RX IP 250 OP 636: Performed by: SURGERY

## 2023-08-24 PROCEDURE — 88305 TISSUE EXAM BY PATHOLOGIST: CPT | Mod: TC | Performed by: SURGERY

## 2023-08-24 PROCEDURE — 370N000017 HC ANESTHESIA TECHNICAL FEE, PER MIN: Performed by: SURGERY

## 2023-08-24 PROCEDURE — A9541 TC99M SULFUR COLLOID: HCPCS | Performed by: RADIOLOGY

## 2023-08-24 PROCEDURE — 360N000075 HC SURGERY LEVEL 2, PER MIN: Performed by: SURGERY

## 2023-08-24 PROCEDURE — 999N000141 HC STATISTIC PRE-PROCEDURE NURSING ASSESSMENT: Performed by: SURGERY

## 2023-08-24 PROCEDURE — 12034 INTMD RPR S/TR/EXT 7.6-12.5: CPT | Mod: 59 | Performed by: SURGERY

## 2023-08-24 PROCEDURE — 250N000011 HC RX IP 250 OP 636: Mod: JZ | Performed by: NURSE ANESTHETIST, CERTIFIED REGISTERED

## 2023-08-24 PROCEDURE — 88305 TISSUE EXAM BY PATHOLOGIST: CPT | Mod: 26 | Performed by: PATHOLOGY

## 2023-08-24 PROCEDURE — 343N000001 HC RX 343: Performed by: RADIOLOGY

## 2023-08-24 PROCEDURE — 88307 TISSUE EXAM BY PATHOLOGIST: CPT | Mod: 26 | Performed by: PATHOLOGY

## 2023-08-24 PROCEDURE — 272N000001 HC OR GENERAL SUPPLY STERILE: Performed by: SURGERY

## 2023-08-24 PROCEDURE — 88342 IMHCHEM/IMCYTCHM 1ST ANTB: CPT | Mod: 26 | Performed by: PATHOLOGY

## 2023-08-24 PROCEDURE — 250N000011 HC RX IP 250 OP 636: Performed by: NURSE ANESTHETIST, CERTIFIED REGISTERED

## 2023-08-24 PROCEDURE — 710N000012 HC RECOVERY PHASE 2, PER MINUTE: Performed by: SURGERY

## 2023-08-24 PROCEDURE — 250N000009 HC RX 250: Performed by: NURSE ANESTHETIST, CERTIFIED REGISTERED

## 2023-08-24 PROCEDURE — 11606 EXC TR-EXT MAL+MARG >4 CM: CPT | Performed by: NURSE ANESTHETIST, CERTIFIED REGISTERED

## 2023-08-24 RX ORDER — CEFAZOLIN SODIUM/WATER 2 G/20 ML
2 SYRINGE (ML) INTRAVENOUS SEE ADMIN INSTRUCTIONS
Status: DISCONTINUED | OUTPATIENT
Start: 2023-08-24 | End: 2023-08-24 | Stop reason: HOSPADM

## 2023-08-24 RX ORDER — BUPIVACAINE HYDROCHLORIDE 5 MG/ML
INJECTION, SOLUTION EPIDURAL; INTRACAUDAL
Status: DISCONTINUED
Start: 2023-08-24 | End: 2023-08-24 | Stop reason: HOSPADM

## 2023-08-24 RX ORDER — LIDOCAINE HYDROCHLORIDE 20 MG/ML
INJECTION, SOLUTION INFILTRATION; PERINEURAL PRN
Status: DISCONTINUED | OUTPATIENT
Start: 2023-08-24 | End: 2023-08-24

## 2023-08-24 RX ORDER — NALOXONE HYDROCHLORIDE 0.4 MG/ML
0.2 INJECTION, SOLUTION INTRAMUSCULAR; INTRAVENOUS; SUBCUTANEOUS
Status: DISCONTINUED | OUTPATIENT
Start: 2023-08-24 | End: 2023-08-24 | Stop reason: HOSPADM

## 2023-08-24 RX ORDER — NALOXONE HYDROCHLORIDE 0.4 MG/ML
0.4 INJECTION, SOLUTION INTRAMUSCULAR; INTRAVENOUS; SUBCUTANEOUS
Status: DISCONTINUED | OUTPATIENT
Start: 2023-08-24 | End: 2023-08-24 | Stop reason: HOSPADM

## 2023-08-24 RX ORDER — DEXAMETHASONE SODIUM PHOSPHATE 4 MG/ML
INJECTION, SOLUTION INTRA-ARTICULAR; INTRALESIONAL; INTRAMUSCULAR; INTRAVENOUS; SOFT TISSUE PRN
Status: DISCONTINUED | OUTPATIENT
Start: 2023-08-24 | End: 2023-08-24

## 2023-08-24 RX ORDER — ONDANSETRON 2 MG/ML
INJECTION INTRAMUSCULAR; INTRAVENOUS PRN
Status: DISCONTINUED | OUTPATIENT
Start: 2023-08-24 | End: 2023-08-24

## 2023-08-24 RX ORDER — HYDRALAZINE HYDROCHLORIDE 20 MG/ML
2.5-5 INJECTION INTRAMUSCULAR; INTRAVENOUS EVERY 10 MIN PRN
Status: DISCONTINUED | OUTPATIENT
Start: 2023-08-24 | End: 2023-08-24 | Stop reason: HOSPADM

## 2023-08-24 RX ORDER — BUPROPION HYDROCHLORIDE 150 MG/1
TABLET ORAL
Qty: 90 TABLET | Refills: 1 | Status: SHIPPED | OUTPATIENT
Start: 2023-08-24 | End: 2023-09-22

## 2023-08-24 RX ORDER — MEPERIDINE HYDROCHLORIDE 25 MG/ML
12.5 INJECTION INTRAMUSCULAR; INTRAVENOUS; SUBCUTANEOUS EVERY 5 MIN PRN
Status: DISCONTINUED | OUTPATIENT
Start: 2023-08-24 | End: 2023-08-24 | Stop reason: HOSPADM

## 2023-08-24 RX ORDER — FENTANYL CITRATE 50 UG/ML
50 INJECTION, SOLUTION INTRAMUSCULAR; INTRAVENOUS EVERY 5 MIN PRN
Status: DISCONTINUED | OUTPATIENT
Start: 2023-08-24 | End: 2023-08-24 | Stop reason: HOSPADM

## 2023-08-24 RX ORDER — SODIUM CHLORIDE, SODIUM LACTATE, POTASSIUM CHLORIDE, CALCIUM CHLORIDE 600; 310; 30; 20 MG/100ML; MG/100ML; MG/100ML; MG/100ML
INJECTION, SOLUTION INTRAVENOUS CONTINUOUS
Status: DISCONTINUED | OUTPATIENT
Start: 2023-08-24 | End: 2023-08-24 | Stop reason: HOSPADM

## 2023-08-24 RX ORDER — ALBUTEROL SULFATE 0.83 MG/ML
2.5 SOLUTION RESPIRATORY (INHALATION) EVERY 4 HOURS PRN
Status: DISCONTINUED | OUTPATIENT
Start: 2023-08-24 | End: 2023-08-24 | Stop reason: HOSPADM

## 2023-08-24 RX ORDER — HYDROMORPHONE HYDROCHLORIDE 1 MG/ML
0.5 INJECTION, SOLUTION INTRAMUSCULAR; INTRAVENOUS; SUBCUTANEOUS EVERY 5 MIN PRN
Status: DISCONTINUED | OUTPATIENT
Start: 2023-08-24 | End: 2023-08-24 | Stop reason: HOSPADM

## 2023-08-24 RX ORDER — PROPOFOL 10 MG/ML
INJECTION, EMULSION INTRAVENOUS PRN
Status: DISCONTINUED | OUTPATIENT
Start: 2023-08-24 | End: 2023-08-24

## 2023-08-24 RX ORDER — KETOROLAC TROMETHAMINE 30 MG/ML
INJECTION, SOLUTION INTRAMUSCULAR; INTRAVENOUS PRN
Status: DISCONTINUED | OUTPATIENT
Start: 2023-08-24 | End: 2023-08-24

## 2023-08-24 RX ORDER — CEFAZOLIN SODIUM/WATER 2 G/20 ML
2 SYRINGE (ML) INTRAVENOUS
Status: COMPLETED | OUTPATIENT
Start: 2023-08-24 | End: 2023-08-24

## 2023-08-24 RX ORDER — ONDANSETRON 2 MG/ML
4 INJECTION INTRAMUSCULAR; INTRAVENOUS EVERY 30 MIN PRN
Status: DISCONTINUED | OUTPATIENT
Start: 2023-08-24 | End: 2023-08-24 | Stop reason: HOSPADM

## 2023-08-24 RX ORDER — LIDOCAINE 40 MG/G
CREAM TOPICAL
Status: DISCONTINUED | OUTPATIENT
Start: 2023-08-24 | End: 2023-08-24 | Stop reason: HOSPADM

## 2023-08-24 RX ORDER — LABETALOL 20 MG/4 ML (5 MG/ML) INTRAVENOUS SYRINGE
10
Status: DISCONTINUED | OUTPATIENT
Start: 2023-08-24 | End: 2023-08-24 | Stop reason: HOSPADM

## 2023-08-24 RX ORDER — FENTANYL CITRATE 50 UG/ML
INJECTION, SOLUTION INTRAMUSCULAR; INTRAVENOUS PRN
Status: DISCONTINUED | OUTPATIENT
Start: 2023-08-24 | End: 2023-08-24

## 2023-08-24 RX ORDER — KETAMINE HYDROCHLORIDE 10 MG/ML
INJECTION INTRAMUSCULAR; INTRAVENOUS PRN
Status: DISCONTINUED | OUTPATIENT
Start: 2023-08-24 | End: 2023-08-24

## 2023-08-24 RX ORDER — LIDOCAINE HYDROCHLORIDE 10 MG/ML
INJECTION, SOLUTION EPIDURAL; INFILTRATION; INTRACAUDAL; PERINEURAL
Status: DISCONTINUED
Start: 2023-08-24 | End: 2023-08-24 | Stop reason: HOSPADM

## 2023-08-24 RX ORDER — ONDANSETRON 4 MG/1
4 TABLET, ORALLY DISINTEGRATING ORAL EVERY 30 MIN PRN
Status: DISCONTINUED | OUTPATIENT
Start: 2023-08-24 | End: 2023-08-24 | Stop reason: HOSPADM

## 2023-08-24 RX ORDER — OXYCODONE HYDROCHLORIDE 5 MG/1
5 TABLET ORAL
Status: COMPLETED | OUTPATIENT
Start: 2023-08-24 | End: 2023-08-24

## 2023-08-24 RX ORDER — HYDROCODONE BITARTRATE AND ACETAMINOPHEN 5; 325 MG/1; MG/1
1 TABLET ORAL EVERY 6 HOURS PRN
Qty: 18 TABLET | Refills: 0 | Status: SHIPPED | OUTPATIENT
Start: 2023-08-24 | End: 2023-08-27

## 2023-08-24 RX ADMIN — MIDAZOLAM 2 MG: 1 INJECTION INTRAMUSCULAR; INTRAVENOUS at 09:47

## 2023-08-24 RX ADMIN — FENTANYL CITRATE 50 MCG: 50 INJECTION, SOLUTION INTRAMUSCULAR; INTRAVENOUS at 11:45

## 2023-08-24 RX ADMIN — ONDANSETRON 4 MG: 2 INJECTION INTRAMUSCULAR; INTRAVENOUS at 09:50

## 2023-08-24 RX ADMIN — Medication 20 MG: at 10:06

## 2023-08-24 RX ADMIN — OXYCODONE HYDROCHLORIDE 5 MG: 5 TABLET ORAL at 12:21

## 2023-08-24 RX ADMIN — KETOROLAC TROMETHAMINE 30 MG: 30 INJECTION, SOLUTION INTRAMUSCULAR at 10:28

## 2023-08-24 RX ADMIN — DEXAMETHASONE SODIUM PHOSPHATE 10 MG: 4 INJECTION, SOLUTION INTRA-ARTICULAR; INTRALESIONAL; INTRAMUSCULAR; INTRAVENOUS; SOFT TISSUE at 09:50

## 2023-08-24 RX ADMIN — Medication 2.04 MILLICURIE: at 08:23

## 2023-08-24 RX ADMIN — PROPOFOL 200 MG: 10 INJECTION, EMULSION INTRAVENOUS at 09:50

## 2023-08-24 RX ADMIN — Medication 2 G: at 09:36

## 2023-08-24 RX ADMIN — FENTANYL CITRATE 100 MCG: 50 INJECTION, SOLUTION INTRAMUSCULAR; INTRAVENOUS at 11:03

## 2023-08-24 RX ADMIN — Medication 30 MG: at 09:50

## 2023-08-24 RX ADMIN — SODIUM CHLORIDE, POTASSIUM CHLORIDE, SODIUM LACTATE AND CALCIUM CHLORIDE: 600; 310; 30; 20 INJECTION, SOLUTION INTRAVENOUS at 09:37

## 2023-08-24 RX ADMIN — LIDOCAINE HYDROCHLORIDE 80 MG: 20 INJECTION, SOLUTION INFILTRATION; PERINEURAL at 09:50

## 2023-08-24 ASSESSMENT — ACTIVITIES OF DAILY LIVING (ADL)
ADLS_ACUITY_SCORE: 33
ADLS_ACUITY_SCORE: 35
ADLS_ACUITY_SCORE: 35
ADLS_ACUITY_SCORE: 36

## 2023-08-24 NOTE — ANESTHESIA CARE TRANSFER NOTE
Patient: Rachel Carvajal    Procedure: Procedure(s):  Wide Local Excision Left Anterior Thigh, with Left Groin [place] lymph node biopsy       Diagnosis: Melanoma of skin (H) [C43.9]  Diagnosis Additional Information: No value filed.    Anesthesia Type:   General     Note:    Oropharynx: spontaneously breathing  Level of Consciousness: drowsy  Oxygen Supplementation: nasal cannula  Level of Supplemental Oxygen (L/min / FiO2): 2  Independent Airway: airway patency satisfactory and stable  Dentition: dentition unchanged  Vital Signs Stable: post-procedure vital signs reviewed and stable  Report to RN Given: handoff report given  Patient transferred to: PACU    Handoff Report: Identifed the Patient, Identified the Reponsible Provider, Reviewed the pertinent medical history, Discussed the surgical course, Reviewed Intra-OP anesthesia mangement and issues during anesthesia, Set expectations for post-procedure period and Allowed opportunity for questions and acknowledgement of understanding    Vitals:  Vitals Value Taken Time   BP     Temp     Pulse     Resp     SpO2         Electronically Signed By: RAVINDRA Jeffers CRNA  August 24, 2023  11:04 AM

## 2023-08-24 NOTE — ANESTHESIA POSTPROCEDURE EVALUATION
Patient: Rachel Carvajal    Procedure: Procedure(s):  Wide Local Excision Left Anterior Thigh, with Left Groin [place] lymph node biopsy       Anesthesia Type:  General    Note:  Disposition: Outpatient   Postop Pain Control: Uneventful            Sign Out: Well controlled pain   PONV: No   Neuro/Psych: Uneventful            Sign Out: Acceptable/Baseline neuro status   Airway/Respiratory: Uneventful            Sign Out: Acceptable/Baseline resp. status   CV/Hemodynamics: Uneventful            Sign Out: Acceptable CV status; No obvious hypovolemia; No obvious fluid overload   Other NRE: NONE   DID A NON-ROUTINE EVENT OCCUR? No       Last vitals:  Vitals Value Taken Time   /89 08/24/23 1205   Temp 98.6  F (37  C) 08/24/23 1205   Pulse 78 08/24/23 1205   Resp 12 08/24/23 1205   SpO2 95 % 08/24/23 1208   Vitals shown include unvalidated device data.    Electronically Signed By: RAVINDRA Muñoz CRNA  August 24, 2023  1:21 PM

## 2023-08-24 NOTE — OR NURSING
Face to face report given with opportunity to observe patient.    Report given to Navi Reyes RN   8/24/2023  12:38 PM

## 2023-08-24 NOTE — ANESTHESIA PROCEDURE NOTES
Airway       Patient location during procedure: OR  Staff -        CRNA: Aj Vegas APRN CRNA       Performed By: CRNA  Consent for Airway        Urgency: elective  Indications and Patient Condition       Indications for airway management: sergio-procedural       Induction type:intravenous       Mask difficulty assessment: 1 - vent by mask    Final Airway Details       Final airway type: supraglottic airway    Supraglottic Airway Details        Type: LMA       Brand: I-Gel       LMA size: 3    Post intubation assessment        Placement verified by: capnometry, equal breath sounds and chest rise        Number of attempts at approach: 1       Secured with: plastic tape       Ease of procedure: easy       Dentition: Intact and Unchanged

## 2023-08-24 NOTE — OR NURSING
PACU Respiratory Event Documentation     1) Episodes of Apnea greater than or equal to 10 seconds: 0    2) Bradypnea - less than 8 breaths per minute: 0    3) Pain score on 0 to 10 scale: 2    4) Pain-sedation mismatch (yes or no): no    5) Repeated 02 desaturation less than 90% (yes or no): no    Anesthesia notified? (yes or no): na    Any of the above events occuring repeatedly in separate 30 minute intervals may be considered recurrent PACU respiratory events.

## 2023-08-24 NOTE — INTERVAL H&P NOTE
"I have reviewed the surgical (or preoperative) H&P that is linked to this encounter, and examined the patient. There are no significant changes    Clinical Conditions Present on Arrival:  Clinically Significant Risk Factors Present on Admission                  # Overweight: Estimated body mass index is 27.28 kg/m  as calculated from the following:    Height as of this encounter: 1.6 m (5' 3\").    Weight as of this encounter: 69.9 kg (154 lb).       "

## 2023-08-24 NOTE — DISCHARGE INSTRUCTIONS
Post-Anesthesia Patient Instructions    IMMEDIATELY FOLLOWING SURGERY:  Do not drive or operate machinery for the first twenty four hours after surgery.  Do not make any important decisions for twenty four hours after surgery or while taking narcotic pain medications or sedatives.  If you develop intractable nausea and vomiting or a severe headache please notify your doctor immediately.    FOLLOW-UP:  Please make an appointment with your surgeon as instructed. You do not need to follow up with anesthesia unless specifically instructed to do so.    WOUND CARE INSTRUCTIONS (if applicable):  Keep a dry clean dressing on the anesthesia/puncture wound site if there is drainage.  Once the wound has quit draining you may leave it open to air.  Generally you should leave the bandage intact for twenty four hours unless there is drainage.  If the epidural site drains for more than 36-48 hours please call the anesthesia department.    QUESTIONS?:  Please feel free to call your physician or the hospital  if you have any questions, and they will be happy to assist you.   [place] Lymph Node Biopsy: About This Test  What is it?  A sentinel lymph node biopsy is a surgery to take out lymph node tissue to look for cancer that has spread into the lymph system.  The lymph system is a network of vessels that carries material between the body tissues and the bloodstream. The sentinel lymph node is the first node in the body where cancer cells may be found if the cancer has spread from the original site.  Why is this test done?  This test is done to see if a cancer has spread from its original site. This information helps stage a cancer. The stage is a way for doctors to describe how far the cancer has spread. Your treatment choices will be based partly on the type and stage of cancer.  How do you prepare for the test?   Follow the instructions exactly about when to stop eating and drinking. If you don't, your test may be  canceled. If your doctor told you to take your medicines on the day of the test, take them with only a sip of water.     Be sure you have someone to take you home. Anesthesia and pain medicine will make it unsafe for you to drive or get home on your own.     Tell your doctor ALL the medicines, vitamins, supplements, and herbal remedies you take. Some may increase the risk of problems during your test. Your doctor will tell you if you should stop taking any of them before the test and how soon to do it.     If you take a medicine that prevents blood clots, your doctor may tell you to stop taking it before your test. Or your doctor may tell you to keep taking it. (These medicines include aspirin and other blood thinners.) Make sure that you understand exactly what your doctor wants you to do.   How is the test done?  Your doctor injects a dye, a tracer, or both into your body near your cancer site. The dye stains the sentinel lymph node or nodes so they can be seen. The tracer travels to the sentinel lymph node where it can be detected.  Your doctor removes the sentinel node or nodes. The node is tested for cancer cells. The results help your doctor decide whether to remove any more nodes, either during the same surgery or at a later time.  You will have some stitches and a bandage over the biopsy site.  How long does the test take?  The biopsy usually takes 30 to 60 minutes. It may take longer if you have surgery to remove the cancer at the same time.  What happens after the test?  If you had general anesthesia, you may feel drowsy for several hours after the biopsy. You may have a mild sore throat from the tube used to help you breathe during the biopsy.  Throat lozenges and gargling with warm salt water may help soothe your sore throat. You may get medicine at the biopsy site that will help with the pain for 6 to 12 hours. You may have more pain after this medicine wears off.  The biopsy site may be sore for  "several days.  The doctor will tell you what to do if you have any bleeding, numbness, or swelling at the biopsy site.  You might be able to go home the same day.  Your skin may be blue from the dye for several days after the test. The dye may also turn your urine green for 1 to 2 days.  Allow the area to heal. Don't move quickly or lift anything heavy until you are feeling better.  During your follow-up visit, your doctor will discuss the results of your biopsy with you and take out any stitches.  Follow-up care is a key part of your treatment and safety. Be sure to make and go to all appointments, and call your doctor if you are having problems. It's also a good idea to keep a list of the medicines you take. Ask your doctor when you can expect to have your test results.  Where can you learn more?  Go to [email or web address]://[email or web address]/patiented  Enter V333 in the search box to learn more about \"[place] Lymph Node Biopsy: About This Test.\"  Current as of: November 30, 2022               Content Version: 13.7    2822-8640 [employer].   Care instructions adapted under license by your healthcare professional. If you have questions about a medical condition or this instruction, always ask your healthcare professional. [employer] disclaims any warranty or liability for your use of this information.    Melanoma Excision Surgery: What to Expect at Home  Your Recovery  Excision of a melanoma is a type of surgery to remove, or excise, a melanoma from your skin. Melanoma is a form of skin cancer in which abnormal skin cells grow out of control.  You may have stitches until the surgical wound heals. This may cause a scar that should fade with time. How quickly your wound heals depends on its size. Most wounds take 1 to 3 weeks to heal. If a large area of skin was removed, you may have a skin graft. In that case, healing may take longer.  Some soreness around the site of the wound is normal. Your doctor " may recommend an over-the-counter medicine or give you a prescription to help if you have pain.  Your doctor may give you specific instructions on when you can do your normal activities again, such as driving and going back to work.  This care sheet gives you a general idea about how long it will take for you to recover. But each person recovers at a different pace. Follow the steps below to get better as quickly as possible.  How can you care for yourself at home?  Activity    If you have stitches, check with your doctor about when you can do your normal activities.     If you have a skin graft, avoid exercise that stretches the skin graft for at least 3 weeks after surgery, unless your doctor gives you other instructions.   Medicines    Your doctor will tell you if and when you can restart your medicines. He or she will also give you instructions about taking any new medicines.     If you stopped taking aspirin or some other blood thinner, your doctor will tell you when to start taking it again.     Be safe with medicines. Read and follow all instructions on the label.  If the doctor gave you a prescription medicine for pain, take it as prescribed.  If you are not taking a prescription pain medicine, ask your doctor if you can take an over-the-counter medicine.   Wound care    You will have a dressing over the wound. A dressing helps the wound heal and protects it. Your doctor will tell you how to take care of this.     If you have stitches, your doctor will tell you when to come back to have them removed.     If you have a skin graft, your doctor will tell you how to change the bandages and when you don't need them anymore.     Wash the area daily with warm, soapy water, and pat it dry. Don't use hydrogen peroxide or alcohol. They can slow healing.     You may shower 24 to 48 hours after surgery. Pat the wound dry. Do not take a bath for the first 2 weeks, or until your doctor tells you it is okay.     If you  "have a skin graft, don't rub it for 3 to 4 weeks.   Follow-up care is a key part of your treatment and safety. Be sure to make and go to all appointments, and call your doctor if you are having problems. It's also a good idea to know your test results and keep a list of the medicines you take.  When should you call for help?   Call 911 anytime you think you may need emergency care. For example, call if:    You passed out (lost consciousness).     You are short of breath.   Call your doctor now or seek immediate medical care if:    You have pain that does not get better after you take pain medicine.     You have loose stitches, or your wound comes open.     Bright red blood has soaked through the bandage over your incision.     You cannot pass stools or gas.     You are sick to your stomach or cannot drink fluids.     You have signs of a blood clot in your leg (called a deep vein thrombosis), such as:  Pain in your calf, back of the knee, thigh, or groin.  Redness or swelling in your leg.     You have symptoms of infection, such as:  Increased pain, swelling, warmth, or redness.  Red streaks leading from the incision.  Pus draining from the incision.  A fever.   Watch closely for changes in your health, and be sure to contact your doctor if you have any problems.  Where can you learn more?  Go to https://www.[employer]/patiented  Enter S741 in the search box to learn more about \"Melanoma Excision Surgery: What to Expect at Home.\"  Current as of: March 22, 2023               Content Version: 13.7    8940-0210 [employer].   Care instructions adapted under license by your healthcare professional. If you have questions about a medical condition or this instruction, always ask your healthcare professional. [employer] disclaims any warranty or liability for your use of this information.            "

## 2023-08-24 NOTE — OR NURSING
Patient and responsible adult given discharge instructions with no questions regarding instructions. Brandon score 19/20. Pain level 2-3/10.  Discharged from unit via wheelchair. Patient discharged to home.

## 2023-08-24 NOTE — OP NOTE
REPORT OF OPERATION  DATE OF PROCEDURE: 8/24/2023    PATIENT: Rachel Carvajal    SURGERY PERFORMED: Wide local excision of the left upper thigh.  Left groin sentinel node biopsy.    PREOPERATIVE DIAGNOSIS: Melanoma of the left upper thigh.  Need for wide local excision and sentinel node biopsy.     POSTOPERATIVE DIAGNOSIS: Same    SURGEON: Burt Avina MD    ASSISTANTS: None    ANESTHESIA: General Endotracheal Anesthesia    COMPLICATIONS: None apparent    TRANSFUSIONS: None    TISSUE TO PATHOLOGY: Reexcision of melanoma of left upper thigh to pathology for pathological diagnosis.  [place] node x1 from left groin to pathology for pathological diagnosis.     FINDINGS: Lesion on left upper thigh consistent with previous incision.  Single hot and blue sentinel node of the left groin.     INDICATIONS: This is a 37 year old female with recently diagnosed melanoma of the left thigh.  Depth was 0.8 mm.  After discussion with her we will take her to the operating room for a wide local excision to reexcise with margins and do a left sentinel groin lymph node biopsy.      DESCRIPTIONS OF PROCEDURE IN DETAIL: After consent was obtained the patient was taken to the operative suite and glenn in the supine position.  The patient was identified and the correct patient was confirmed.  General Endotracheal Anesthesia was given by anesthesia.  Prior to coming to the operating room the previous excision site was injected with technetium 99 and a methylene blue dye.  The patient was sterilely prepped and draped in the usual fashion.  A time out was performed verifying the correct patient and the correct procedure.  The entire operative team was in agreement.  All necessary equipment and supplies were in the room.    After identification of the prior biopsy site a  4 cm x 12 cm ellipsoid incision the skin was then sharply entered and dissection was carried down to the underlying fat.  The size was necessitated because the  previous biopsy had been done in a transverse rather longitudinal orientation.  The lesion was then removed in its entirely.  Hemostasis was assured.  The lesion was then marked with suture for orientation (Short = Superior aspect, Medium = Medial aspect, Long = Lateral aspect).  The lesion was sent to pathology for pathological diagnosis.  Hemostasis was assured.  The deep tissues were re-approximated with 3-0 Vicryl and he skin was closed with vertical mattress sutures of 2-0 nylon and the skin edges were reapproximated with running 4-0 nylon sutures.  Sterile dressing were applied.       The previous the biopsy site was interrogated prior to excision and had a count of 96218.  The left groin was then interrogated and showed a hotspot with 9014 counts.  Using this site the skin was sharply entered and dissection carried down to isolation of a hot node.  The node was blue.  The gamma counter was extensively utilized during this dissection and localization.  A total of 1 sentinel nodes were identified.  The counts of the node was 07121.  This was sent to pathology for pathological diagnosis.  At the conclusion of the isolation of sentinel nodes the background counts in the left groin was 430   Hemostasis was assured.  The deep fascia was then closed with simple interrupted 3-0 Vicryl.  The dermis was closed with 3-0 Vicryl.  The skin was closed in a subcuticular fashion with 4-0 Monocryl.  Sterile dressings were applied.     All needle, sponge and instrument counts were correct x2.  The patient was awakened in the operating room extubated without difficulty and taken to the recovery room in stable condition tolerated the procedure well.

## 2023-08-30 LAB
PATH REPORT.COMMENTS IMP SPEC: NORMAL
PATH REPORT.COMMENTS IMP SPEC: NORMAL
PATH REPORT.FINAL DX SPEC: NORMAL
PATH REPORT.GROSS SPEC: NORMAL
PATH REPORT.MICROSCOPIC SPEC OTHER STN: NORMAL
PATH REPORT.RELEVANT HX SPEC: NORMAL
PATHOLOGY SYNOPTIC REPORT: NORMAL
PHOTO IMAGE: NORMAL

## 2023-08-31 DIAGNOSIS — F33.0 MILD EPISODE OF RECURRENT MAJOR DEPRESSIVE DISORDER (H): ICD-10-CM

## 2023-09-01 NOTE — TELEPHONE ENCOUNTER
sertraline (ZOLOFT) 50 MG tablet       Last Written Prescription Date:  6/6/23  Last Fill Quantity: 30,   # refills: 2  Last Office Visit: 7/27/23  Future Office visit:

## 2023-09-13 ENCOUNTER — OFFICE VISIT (OUTPATIENT)
Dept: SURGERY | Facility: OTHER | Age: 38
End: 2023-09-13
Attending: NURSE PRACTITIONER
Payer: COMMERCIAL

## 2023-09-13 VITALS
WEIGHT: 154 LBS | TEMPERATURE: 98.7 F | HEIGHT: 63 IN | DIASTOLIC BLOOD PRESSURE: 82 MMHG | BODY MASS INDEX: 27.29 KG/M2 | OXYGEN SATURATION: 98 % | HEART RATE: 80 BPM | SYSTOLIC BLOOD PRESSURE: 138 MMHG

## 2023-09-13 DIAGNOSIS — Z98.890 STATUS POST EXCISIONAL BIOPSY: Primary | ICD-10-CM

## 2023-09-13 PROCEDURE — 99024 POSTOP FOLLOW-UP VISIT: CPT | Performed by: NURSE PRACTITIONER

## 2023-09-13 ASSESSMENT — PAIN SCALES - GENERAL: PAINLEVEL: NO PAIN (0)

## 2023-09-13 NOTE — PROGRESS NOTES
"CLINIC NOTE - POST-OP SURGERY  9/13/2023    Patient:Rachel Carvajal    Procedure: Wide local excision of the left upper thigh.  Left groin sentinel node biopsy.     This is a 38 year old female who is 3 weeks s/p Wide local excision of the left upper thigh.  Left groin sentinel node biopsy.      Current Medications:  Current Outpatient Medications   Medication Sig Dispense Refill    buPROPion (WELLBUTRIN XL) 150 MG 24 hr tablet TAKE 1 TABLET(150 MG) BY MOUTH EVERY MORNING 90 tablet 1    sertraline (ZOLOFT) 50 MG tablet TAKE 1 TABLET(50 MG) BY MOUTH DAILY 30 tablet 4       Allergies:  Allergies   Allergen Reactions    Penicillins Rash       PHYSICAL EXAM:   Vital signs: /82 (BP Location: Right arm, Cuff Size: Adult Regular)   Pulse 80   Temp 98.7  F (37.1  C) (Tympanic)   Ht 1.6 m (5' 3\")   Wt 69.9 kg (154 lb)   SpO2 98%   BMI 27.28 kg/m     BMI: Body mass index is 27.28 kg/m .   General: Normal, healthy, cooperative, in no acute distress, alert   Lungs: respirations are non-labored   Abdominal: non-distended   Wounds:  Well healed surgical scars consistent with her operation.     PATHOLOGY:  Case Report   Date Value Ref Range Status   08/24/2023   Final    Surgical Pathology Report                         Case: [id number]                                  Authorizing Provider:  Burt Avina MD  Collected:           08/24/2023 10:28 AM          Ordering Location:     [facility] Operating Room     Received:            08/24/2023 11:51 AM          Pathologist:           Glen Osorio DO                                                         Specimens:   A) - Thigh, Left, Re excsion left thigh melanoma                                                    B) - Groin, Left, left groin sentinel node                                                  Final Diagnosis   Date Value Ref Range Status   08/24/2023   Final    A.  Skin, left thigh melanoma, re-excision:  -No residual melanoma " identified.  -Focal area of dermal fibroplasia consistent with previous biopsy site.  -See synoptic report below.    B.  [place] lymph node, left groin, excisional biopsy:  -One lymph node negative for metastatic melanoma.          ASSESSMENT:    38 year old female who is 3 weeks s/p Wide local excision of the left upper thigh.  Left groin sentinel node biopsy.  Doing well.     PLAN:   Running suture was removed without problems.    Follow-up next week for removal of retention sutures.  Sooner with problems/concerns.

## 2023-09-20 ENCOUNTER — OFFICE VISIT (OUTPATIENT)
Dept: SURGERY | Facility: OTHER | Age: 38
End: 2023-09-20
Attending: NURSE PRACTITIONER
Payer: COMMERCIAL

## 2023-09-20 VITALS
OXYGEN SATURATION: 98 % | HEIGHT: 63 IN | RESPIRATION RATE: 14 BRPM | WEIGHT: 154 LBS | TEMPERATURE: 98.4 F | DIASTOLIC BLOOD PRESSURE: 78 MMHG | SYSTOLIC BLOOD PRESSURE: 126 MMHG | HEART RATE: 83 BPM | BODY MASS INDEX: 27.29 KG/M2

## 2023-09-20 DIAGNOSIS — Z98.890 STATUS POST EXCISIONAL BIOPSY: Primary | ICD-10-CM

## 2023-09-20 PROCEDURE — 99024 POSTOP FOLLOW-UP VISIT: CPT | Performed by: NURSE PRACTITIONER

## 2023-09-20 ASSESSMENT — PAIN SCALES - GENERAL: PAINLEVEL: NO PAIN (0)

## 2023-09-20 NOTE — PROGRESS NOTES
"CLINIC NOTE - POST-OP SURGERY  9/20/2023    Patient:Rachel Carvajal    Procedure: Wide local excision of the left upper thigh.  Left groin sentinel node biopsy.     This is a 38 year old female who is 4 weeks s/p Wide local excision of the left upper thigh.  Left groin sentinel node biopsy.      Current Medications:  Current Outpatient Medications   Medication Sig Dispense Refill    buPROPion (WELLBUTRIN XL) 150 MG 24 hr tablet TAKE 1 TABLET(150 MG) BY MOUTH EVERY MORNING 90 tablet 1    sertraline (ZOLOFT) 50 MG tablet TAKE 1 TABLET(50 MG) BY MOUTH DAILY 30 tablet 4       Allergies:  Allergies   Allergen Reactions    Penicillins Rash       PHYSICAL EXAM:   Vital signs: /78 (BP Location: Right arm, Cuff Size: Adult Regular)   Pulse 83   Temp 98.4  F (36.9  C) (Tympanic)   Resp 14   Ht 1.6 m (5' 3\")   Wt 69.9 kg (154 lb)   SpO2 98%   BMI 27.28 kg/m     BMI: Body mass index is 27.28 kg/m .   General: Normal, healthy, cooperative, in no acute distress, alert   Lungs: respirations are non-labored   Abdominal: non-distended   Wounds:  Well healed surgical scars consistent with her operation.     PATHOLOGY:  Case Report   Date Value Ref Range Status   08/24/2023   Final    Surgical Pathology Report                         Case: [id number]                                  Authorizing Provider:  Burt Avina MD  Collected:           08/24/2023 10:28 AM          Ordering Location:     [facility] Operating [facility]     Received:            08/24/2023 11:51 AM          Pathologist:           Glen Osorio DO                                                         Specimens:   A) - Thigh, Left, Re excsion left thigh melanoma                                                    B) - Groin, Left, left groin sentinel node                                                  Final Diagnosis   Date Value Ref Range Status   08/24/2023   Final    A.  Skin, left thigh melanoma, re-excision:  -No residual melanoma " identified.  -Focal area of dermal fibroplasia consistent with previous biopsy site.  -See synoptic report below.    B.  [place] lymph node, left groin, excisional biopsy:  -One lymph node negative for metastatic melanoma.          ASSESSMENT:    38 year old female who is 4 weeks s/p Wide local excision of the left upper thigh.  Left groin sentinel node biopsy.  Doing well.     PLAN:   Remaining sutures was removed without problems.    Follow-up as needed

## 2023-09-21 ENCOUNTER — TRANSFERRED RECORDS (OUTPATIENT)
Dept: HEALTH INFORMATION MANAGEMENT | Facility: CLINIC | Age: 38
End: 2023-09-21
Payer: COMMERCIAL

## 2023-09-21 ENCOUNTER — TELEPHONE (OUTPATIENT)
Dept: ONCOLOGY | Facility: OTHER | Age: 38
End: 2023-09-21

## 2023-09-21 DIAGNOSIS — F33.0 MILD EPISODE OF RECURRENT MAJOR DEPRESSIVE DISORDER (H): ICD-10-CM

## 2023-09-21 NOTE — TELEPHONE ENCOUNTER
From: Muriel Saravia NP    make sure she has a follow up with Dr. Perry/ Dr. Perry wanted to see her after surgery was complete.  Spoke with pt to schedule this and pt denied as she stated there isnt a reason for a follow up with Dr. Perry. Advised that Dr. Perry recommended it and pt stated if she changes her mind or if something else is needed she will reach out or Dr. Perry can advise what else is needed.

## 2023-09-22 RX ORDER — BUPROPION HYDROCHLORIDE 150 MG/1
TABLET ORAL
Qty: 30 TABLET | Refills: 2 | Status: SHIPPED | OUTPATIENT
Start: 2023-09-22 | End: 2024-04-05

## 2023-09-22 NOTE — TELEPHONE ENCOUNTER
Wellbutrin      Last Written Prescription Date:  8/24/23  Last Fill Quantity: 90,   # refills: 1  Last Office Visit: 7/27/23  Future Office visit:       Routing refill request to provider for review/approval because:

## 2024-02-05 DIAGNOSIS — F33.0 MILD EPISODE OF RECURRENT MAJOR DEPRESSIVE DISORDER (H): ICD-10-CM

## 2024-02-05 NOTE — TELEPHONE ENCOUNTER
Zoloft  Last Written Prescription Date: 9/1/23  Last Fill Quantity: 30 # of Refills: 4  Last Office Visit: 7/27/23

## 2024-02-06 NOTE — TELEPHONE ENCOUNTER
Attempt # 1  Outcome: Left Message   Comment: lvm for pt to call back to schedule a follow up with pcp

## 2024-02-08 NOTE — TELEPHONE ENCOUNTER
Attempt # 2  Outcome: Left Message   Comment: lvm for pt to call us back to schedule with pcp for a med review

## 2024-02-09 NOTE — TELEPHONE ENCOUNTER
Attempt # 3  Outcome: Letter sent - max attempts reached   Comment: max attempts via phone - letter sent out

## 2024-03-08 DIAGNOSIS — F33.0 MILD EPISODE OF RECURRENT MAJOR DEPRESSIVE DISORDER (H): ICD-10-CM

## 2024-03-08 NOTE — TELEPHONE ENCOUNTER
SERTRALINE 50MG TABLETS         Last Written Prescription Date:  2/9/24  Last Fill Quantity: 30,   # refills: 0  Last Office Visit: 7/27/23  Future Office visit:       Routing refill request to provider for review/approval because:  SSRIs Protocol [id number]/08/2024 03:15 AM   Protocol Details PHQ-9 score less than 5 in past 6 months    Recent (6 mo) or future (30 days) visit within the authorizing provider's specialty         1/6/2023     2:24 PM 7/7/2023     2:20 PM 8/9/2023    10:00 AM   PHQ   PHQ-9 Total Score 0 0 0   Q9: Thoughts of better off dead/self-harm past 2 weeks Not at all Not at all Not at all

## 2024-03-11 NOTE — TELEPHONE ENCOUNTER
Attempt # 2  Outcome: Left Message   Comment: med review with PCP    7 year old with history of recurrent episodes of uncontrolled emesis, being admitted for emesis x4 days associated with decreased PO intake. This is Shiela's 4th episode within the last 6 months, has stereotypical pattern to her episodes which start at the end of the month and last for about 10 days. Has had extensive workup for emesis done during her past hospital admissions, workup has so far been negative. Most likely diagnosis of cyclic vomiting syndrome given her pattern of emesis, along with having greater than 3 episodes in past six months and having an otherwise negative work-up. She also has hypertension associated with her episodes for which she is being followed by nephrology team.     Plan:   Cyclic Vomiting Syndrome  - D10 NS @1.5 MIVF to limit ketosis and catabolism   - IV Tera 4mg PRN   - IV Ativan 0.05 mg/kg q6 hours    Hypertension  - Per nephrology team 7 year old with history of recurrent episodes of uncontrolled emesis, being admitted for emesis x4 days associated with decreased PO intake. This is Shiela's 3rd episode within the last 6 months, has stereotypical pattern to her episodes which start at the end of the month and last for about 10 days. Has had extensive workup for emesis done during her past hospital admissions, workup has so far been negative. Most likely diagnosis of cyclic vomiting syndrome given her pattern of emesis, along with having had 3 episodes in past six months and having an otherwise negative work-up. She also has hypertension associated with her episodes for which she is being followed by nephrology team.     Plan:   Cyclic Vomiting Syndrome  - D10 NS @1.5 MIVF to limit ketosis and catabolism   - IV Tera 4mg PRN   - IV Ativan 0.05 mg/kg q6 hours    Hypertension  - Per nephrology team 7 year old with history of recurrent episodes of uncontrolled emesis, being admitted for emesis x4 days associated with decreased PO intake. This is Shiela's 3rd episode within the last 6 months, has stereotypical pattern to her episodes which start at the end of the month and last for about 10 days. Has had extensive workup for emesis done during her past hospital admissions, workup has so far been negative. Most likely diagnosis of cyclic vomiting syndrome given her pattern of emesis, along with having had 3 episodes in past six months and having an otherwise negative work-up. She also has hypertension associated with her episodes for which she is being followed by nephrology team.     Plan:   Cyclic Vomiting Syndrome  - D10 NS @1.5 MIVF to limit ketosis and catabolism   - IV Tera 4mg PRN   - IV Ativan 0.05 mg/kg q6 hours  - Additional abortive therapies as needed    Hypertension  - Per nephrology team

## 2024-04-03 ENCOUNTER — MYC MEDICAL ADVICE (OUTPATIENT)
Dept: FAMILY MEDICINE | Facility: OTHER | Age: 39
End: 2024-04-03

## 2024-04-05 DIAGNOSIS — F33.0 MILD EPISODE OF RECURRENT MAJOR DEPRESSIVE DISORDER (H): ICD-10-CM

## 2024-04-05 RX ORDER — BUPROPION HYDROCHLORIDE 150 MG/1
TABLET ORAL
Qty: 90 TABLET | Refills: 0 | Status: SHIPPED | OUTPATIENT
Start: 2024-04-05 | End: 2024-04-15

## 2024-04-05 NOTE — TELEPHONE ENCOUNTER
BUPROPION XL 150MG TABLETS (24 H)           Last Written Prescription Date:  9/22/23  Last Fill Quantity: 30,   # refills: 2  Last Office Visit: 7/27/23  Future Office visit:    Next 5 appointments (look out 90 days)      Apr 15, 2024  1:30 PM  (Arrive by 1:15 PM)  Provider Visit with Mary Rankin MD  [facility] ([facility] ) [address]  [facility] [address]  [phone]             Routing refill request to provider for review/approval because:      SSRIs Protocol [id number]/05/2024 03:15 AM   Protocol Details PHQ-9 score less than 5 in past 6 months           1/6/2023     2:24 PM 7/7/2023     2:20 PM 8/9/2023    10:00 AM   PHQ   PHQ-9 Total Score 0 0 0   Q9: Thoughts of better off dead/self-harm past 2 weeks Not at all Not at all Not at all

## 2024-04-09 ENCOUNTER — MYC REFILL (OUTPATIENT)
Dept: FAMILY MEDICINE | Facility: OTHER | Age: 39
End: 2024-04-09

## 2024-04-09 DIAGNOSIS — F33.0 MILD EPISODE OF RECURRENT MAJOR DEPRESSIVE DISORDER (H): ICD-10-CM

## 2024-04-09 NOTE — TELEPHONE ENCOUNTER
Disp Refills Start End MARCOS   sertraline (ZOLOFT) 50 MG tablet 30 tablet 0 3/18/2024       Future Office visit:    Next 5 appointments (look out 90 days)      Apr 15, 2024  1:30 PM  (Arrive by 1:15 PM)  Provider Visit with Mary Rankin MD  [facility] ([facility] ) [address]  [facility] [address]  [phone]             Routing refill request to provider for review/approval because:

## 2024-04-09 NOTE — TELEPHONE ENCOUNTER
Patient comment: I have an appointment on april 15th so i only need 5 pills to hold menover till the appointment. Thanks     SSRIs Protocol [id number]/09/2024 10:16 AM   Protocol Details PHQ-9 score less than 5 in past 6 months              1/6/2023     2:24 PM 7/7/2023     2:20 PM 8/9/2023    10:00 AM   PHQ   PHQ-9 Total Score 0 0 0   Q9: Thoughts of better off dead/self-harm past 2 weeks Not at all Not at all Not at all

## 2024-04-10 NOTE — PROGRESS NOTES
"  Assessment & Plan     1. Mild episode of recurrent major depressive disorder (H24)  No changes to current medications recommended, will renew at current doses.  Follow-up on annual basis.  - buPROPion (WELLBUTRIN XL) 150 MG 24 hr tablet; Take 1 tablet (150 mg) by mouth every morning  Dispense: 90 tablet; Refill: 3  - sertraline (ZOLOFT) 50 MG tablet; Take 1 tablet (50 mg) by mouth daily  Dispense: 90 tablet; Refill: 3    2. Malignant melanoma of left lower extremity (H)  Continue working with Dermatology.    Of note, BP is mildly elevated.  Recheck with nurse only visit in 2 weeks.       The longitudinal plan of care for the diagnosis(es)/condition(s) as documented were addressed during this visit. Due to the added complexity in care, I will continue to support Rachel in the subsequent management and with ongoing continuity of care.       BMI  Estimated body mass index is 27.14 kg/m  as calculated from the following:    Height as of 9/20/23: 1.6 m (5' 3\").    Weight as of this encounter: 69.5 kg (153 lb 3.2 oz).       Return in about 2 weeks (around 4/29/2024) for nurse only BP check.      Dominick Ramos is a 38 year old, presenting for the following health issues:  Depression    HPI     Depression   How are you doing with your depression since your last visit? No change  Are you having other symptoms that might be associated with depression? No  Have you had a significant life event?  No   Are you feeling anxious or having panic attacks?   No  Do you have any concerns with your use of alcohol or other drugs? No    Social History     Tobacco Use    Smoking status: Never    Smokeless tobacco: Never   Vaping Use    Vaping status: Never Used   Substance Use Topics    Alcohol use: Not Currently     Alcohol/week: 0.0 standard drinks of alcohol     Comment: rare    Drug use: No         7/7/2023     2:20 PM 8/9/2023    10:00 AM 4/15/2024     1:17 PM   PHQ   PHQ-9 Total Score 0 0 0   Q9: Thoughts of better off " dead/self-harm past 2 weeks Not at all Not at all Not at all         1/6/2023     2:00 PM 7/7/2023     2:20 PM 4/15/2024     1:17 PM   JOHANA-7 SCORE   Total Score  0 (minimal anxiety) 2 (minimal anxiety)   Total Score 0 0 2         4/15/2024     1:17 PM   Last PHQ-9   1.  Little interest or pleasure in doing things 0   2.  Feeling down, depressed, or hopeless 0   3.  Trouble falling or staying asleep, or sleeping too much 0   4.  Feeling tired or having little energy 0   5.  Poor appetite or overeating 0   6.  Feeling bad about yourself 0   7.  Trouble concentrating 0   8.  Moving slowly or restless 0   Q9: Thoughts of better off dead/self-harm past 2 weeks 0   PHQ-9 Total Score 0         4/15/2024     1:17 PM   JOHANA-7    1. Feeling nervous, anxious, or on edge 1   2. Not being able to stop or control worrying 0   3. Worrying too much about different things 0   4. Trouble relaxing 0   5. Being so restless that it is hard to sit still 0   6. Becoming easily annoyed or irritable 1   7. Feeling afraid, as if something awful might happen 0   JOHANA-7 Total Score 2   If you checked any problems, how difficult have they made it for you to do your work, take care of things at home, or get along with other people? Somewhat difficult       Suicide Assessment Five-step Evaluation and Treatment (SAFE-T)      Patient does follow routinely for skin checks with Dermatology.        Review of Systems  Constitutional, HEENT, cardiovascular, pulmonary, gi and gu systems are negative, except as otherwise noted.      Objective    BP (!) 144/96   Pulse 69   Temp 99.2  F (37.3  C) (Tympanic)   Resp 16   Wt 69.5 kg (153 lb 3.2 oz)   SpO2 100%   Breastfeeding No   BMI 27.14 kg/m    Body mass index is 27.14 kg/m .  Physical Exam   GENERAL: alert and no distress  PSYCH: mentation appears normal, affect normal/bright          Signed Electronically by: Mary Rankin MD

## 2024-04-15 ENCOUNTER — OFFICE VISIT (OUTPATIENT)
Dept: FAMILY MEDICINE | Facility: OTHER | Age: 39
End: 2024-04-15
Attending: FAMILY MEDICINE
Payer: COMMERCIAL

## 2024-04-15 VITALS
OXYGEN SATURATION: 100 % | DIASTOLIC BLOOD PRESSURE: 96 MMHG | WEIGHT: 153.2 LBS | TEMPERATURE: 99.2 F | RESPIRATION RATE: 16 BRPM | SYSTOLIC BLOOD PRESSURE: 144 MMHG | HEART RATE: 69 BPM | BODY MASS INDEX: 27.14 KG/M2

## 2024-04-15 DIAGNOSIS — C43.72 MALIGNANT MELANOMA OF LEFT LOWER EXTREMITY (H): ICD-10-CM

## 2024-04-15 DIAGNOSIS — F33.0 MILD EPISODE OF RECURRENT MAJOR DEPRESSIVE DISORDER (H): Primary | ICD-10-CM

## 2024-04-15 PROCEDURE — 99213 OFFICE O/P EST LOW 20 MIN: CPT | Performed by: FAMILY MEDICINE

## 2024-04-15 PROCEDURE — G2211 COMPLEX E/M VISIT ADD ON: HCPCS | Performed by: FAMILY MEDICINE

## 2024-04-15 RX ORDER — BUPROPION HYDROCHLORIDE 150 MG/1
150 TABLET ORAL EVERY MORNING
Qty: 90 TABLET | Refills: 3 | Status: SHIPPED | OUTPATIENT
Start: 2024-04-15

## 2024-04-15 ASSESSMENT — PATIENT HEALTH QUESTIONNAIRE - PHQ9
SUM OF ALL RESPONSES TO PHQ QUESTIONS 1-9: 0
SUM OF ALL RESPONSES TO PHQ QUESTIONS 1-9: 0

## 2024-04-15 ASSESSMENT — ANXIETY QUESTIONNAIRES
8. IF YOU CHECKED OFF ANY PROBLEMS, HOW DIFFICULT HAVE THESE MADE IT FOR YOU TO DO YOUR WORK, TAKE CARE OF THINGS AT HOME, OR GET ALONG WITH OTHER PEOPLE?: SOMEWHAT DIFFICULT
2. NOT BEING ABLE TO STOP OR CONTROL WORRYING: NOT AT ALL
1. FEELING NERVOUS, ANXIOUS, OR ON EDGE: SEVERAL DAYS
7. FEELING AFRAID AS IF SOMETHING AWFUL MIGHT HAPPEN: NOT AT ALL
5. BEING SO RESTLESS THAT IT IS HARD TO SIT STILL: NOT AT ALL
6. BECOMING EASILY ANNOYED OR IRRITABLE: SEVERAL DAYS
4. TROUBLE RELAXING: NOT AT ALL
GAD7 TOTAL SCORE: 2
7. FEELING AFRAID AS IF SOMETHING AWFUL MIGHT HAPPEN: NOT AT ALL
GAD7 TOTAL SCORE: 2
3. WORRYING TOO MUCH ABOUT DIFFERENT THINGS: NOT AT ALL
IF YOU CHECKED OFF ANY PROBLEMS ON THIS QUESTIONNAIRE, HOW DIFFICULT HAVE THESE PROBLEMS MADE IT FOR YOU TO DO YOUR WORK, TAKE CARE OF THINGS AT HOME, OR GET ALONG WITH OTHER PEOPLE: SOMEWHAT DIFFICULT
GAD7 TOTAL SCORE: 2

## 2024-04-15 ASSESSMENT — PAIN SCALES - GENERAL: PAINLEVEL: NO PAIN (0)

## 2024-04-16 PROBLEM — C43.72: Status: ACTIVE | Noted: 2024-04-16

## 2024-04-26 NOTE — PROGRESS NOTES
Patient presents to Clinic for Nurse Only Visit- BP Check per Dr. Monterroso.     LOV: 4/15/24 with Dr. Motnerroso /96  No BP Meds  Current Outpatient Medications   Medication Sig Dispense Refill    buPROPion (WELLBUTRIN XL) 150 MG 24 hr tablet Take 1 tablet (150 mg) by mouth every morning 90 tablet 3    sertraline (ZOLOFT) 50 MG tablet Take 1 tablet (50 mg) by mouth daily 90 tablet 3     No current facility-administered medications for this visit.     BP Readings from Last 3 Encounters:   04/29/24 (!) 142/84   04/15/24 (!) 144/96   09/20/23 126/78            4/29/2024 2:05 PM 4/29/2024 2:14 PM    /82 142/84   BP Location Right arm Right arm   Patient Position Sitting Sitting   Cuff Size Adult Regular Adult Regular   Pulse 69    SpO2 100 %      Discussed the above readings with Dr. Monterroso.  She advises to have patient watch sodium intake, make sure to be taking in enough fluids, and increase activity.    Discussed advise from Dr. Monterroso with the patient.  Patient states she has been working out daily for the past 6 months, drinks at least 2 Brian mugs (about 40 ounces each) daily, and does not add salt to food.  Will provided this update to Dr. Monterroso to see if this changes her recommendations and to advise on follow-up.    Kaitlyn Donohue, RN Care Coordinator  [facility]

## 2024-04-29 ENCOUNTER — ALLIED HEALTH/NURSE VISIT (OUTPATIENT)
Dept: FAMILY MEDICINE | Facility: OTHER | Age: 39
End: 2024-04-29
Attending: FAMILY MEDICINE
Payer: COMMERCIAL

## 2024-04-29 VITALS — DIASTOLIC BLOOD PRESSURE: 84 MMHG | HEART RATE: 69 BPM | SYSTOLIC BLOOD PRESSURE: 142 MMHG | OXYGEN SATURATION: 100 %

## 2024-04-29 DIAGNOSIS — Z01.30 BP CHECK: Primary | ICD-10-CM

## 2024-05-01 ENCOUNTER — MYC MEDICAL ADVICE (OUTPATIENT)
Dept: FAMILY MEDICINE | Facility: OTHER | Age: 39
End: 2024-05-01

## 2024-05-01 DIAGNOSIS — A60.00 HERPES SIMPLEX INFECTION OF GENITOURINARY SYSTEM: Primary | ICD-10-CM

## 2024-05-07 RX ORDER — ACYCLOVIR 400 MG/1
400 TABLET ORAL EVERY 12 HOURS
Qty: 60 TABLET | Refills: 5 | Status: SHIPPED | OUTPATIENT
Start: 2024-05-07

## 2024-05-07 NOTE — TELEPHONE ENCOUNTER
Pended up past acyclovir 400 mg BID order per PCP message below and pt in agreement.  Please review and sign is appropriate.

## 2024-06-29 ENCOUNTER — HEALTH MAINTENANCE LETTER (OUTPATIENT)
Age: 39
End: 2024-06-29

## 2024-09-07 ENCOUNTER — HEALTH MAINTENANCE LETTER (OUTPATIENT)
Age: 39
End: 2024-09-07

## 2024-10-10 DIAGNOSIS — F33.0 MILD EPISODE OF RECURRENT MAJOR DEPRESSIVE DISORDER (H): ICD-10-CM

## 2024-10-10 DIAGNOSIS — A60.00 HERPES SIMPLEX INFECTION OF GENITOURINARY SYSTEM: ICD-10-CM

## 2024-10-10 RX ORDER — ACYCLOVIR 400 MG/1
400 TABLET ORAL EVERY 12 HOURS
Qty: 180 TABLET | Refills: 1 | Status: SHIPPED | OUTPATIENT
Start: 2024-10-10

## 2024-10-10 NOTE — TELEPHONE ENCOUNTER
Zoloft      Last Written Prescription Date:  04/15/24  Last Fill Quantity: 90,   # refills: 3  Last Office Visit: 08/01/24  Future Office visit:

## 2024-10-10 NOTE — TELEPHONE ENCOUNTER
Zovirax      Last Written Prescription Date:  05/07/24  Last Fill Quantity: 60,   # refills: 5  Last Office Visit: 08/01/24  Future Office visit:

## 2024-11-08 DIAGNOSIS — F33.0 MILD EPISODE OF RECURRENT MAJOR DEPRESSIVE DISORDER (H): ICD-10-CM

## 2024-11-08 RX ORDER — BUPROPION HYDROCHLORIDE 150 MG/1
150 TABLET ORAL EVERY MORNING
Qty: 90 TABLET | Refills: 0 | Status: SHIPPED | OUTPATIENT
Start: 2024-11-08

## 2024-11-08 NOTE — TELEPHONE ENCOUNTER
Rx Protocol Bupropion [id number]/08/2024 09:21 AM   Protocol Details PHQ-9 score of less than 5 in past 6 months           7/7/2023     2:20 PM 8/9/2023    10:00 AM 4/15/2024     1:17 PM   PHQ   PHQ-9 Total Score 0 0 0   Q9: Thoughts of better off dead/self-harm past 2 weeks Not at all  Not at all Not at all        Patient-reported

## 2024-11-08 NOTE — TELEPHONE ENCOUNTER
Wellbutrin      Last Written Prescription Date:  04/15/24  Last Fill Quantity: 90,   # refills: 3  Last Office Visit: 08/01/24  Future Office visit:

## 2025-07-13 ENCOUNTER — HEALTH MAINTENANCE LETTER (OUTPATIENT)
Age: 40
End: 2025-07-13

## 2025-07-21 DIAGNOSIS — F33.0 MILD EPISODE OF RECURRENT MAJOR DEPRESSIVE DISORDER: ICD-10-CM

## 2025-07-21 RX ORDER — BUPROPION HYDROCHLORIDE 150 MG/1
150 TABLET ORAL EVERY MORNING
Qty: 90 TABLET | Refills: 0 | OUTPATIENT
Start: 2025-07-21

## 2025-07-21 NOTE — TELEPHONE ENCOUNTER
bupropion (WELLBUTRIN XL) 150 mg 24 hr tablet     Last Written Prescription Date:  03/07/2025  Last Fill Quantity: 90,   # refills: 0  Last Office Visit: 08/01/2024  Future Office visit:       Routing refill request to provider for review/approval because:  Protocol failed on the Wellbutrin.

## 2025-07-28 ENCOUNTER — MYC REFILL (OUTPATIENT)
Dept: FAMILY MEDICINE | Facility: OTHER | Age: 40
End: 2025-07-28

## 2025-07-28 DIAGNOSIS — A60.00 HERPES SIMPLEX INFECTION OF GENITOURINARY SYSTEM: ICD-10-CM

## 2025-07-29 NOTE — TELEPHONE ENCOUNTER
acyclovir (ZOVIRAX) 400 MG tablet       Last Written Prescription Date:  10/10/24  Last Fill Quantity: 180,   # refills: 1  Last Office Visit: 4/15/24  Future Office visit:     Patient comment: [place] medication. Can i get enough for 6 days? Also, can you send it to [facility] pharmacy in [place]. Thank you.   Routing refill request to provider for review/approval because:    Antivirals [id number]/28/2025 08:50 PM   Protocol Details Recent (12 month) or future (90 days) visit with authorizing provider's specialty (provided they have been seen in the past 15 months)

## 2025-07-30 NOTE — TELEPHONE ENCOUNTER
"Future Appointments 7/30/2025 - 1/26/2026        Date Visit Type Length Department Provider     8/7/2025  2:30 PM [facility] OFFICE VISIT  30 min [facility] Mary Rankin MD    Location Instructions:     \"[facility] is located off [address] on [address]. Enter through the front entrance to register for your appointment.\"                   e    "

## 2025-07-31 RX ORDER — ACYCLOVIR 400 MG/1
400 TABLET ORAL EVERY 12 HOURS
Qty: 14 TABLET | Refills: 0 | Status: SHIPPED | OUTPATIENT
Start: 2025-07-31

## 2025-08-07 ENCOUNTER — OFFICE VISIT (OUTPATIENT)
Dept: FAMILY MEDICINE | Facility: OTHER | Age: 40
End: 2025-08-07
Attending: FAMILY MEDICINE
Payer: COMMERCIAL

## 2025-08-07 VITALS
BODY MASS INDEX: 26.75 KG/M2 | WEIGHT: 151 LBS | TEMPERATURE: 98.7 F | DIASTOLIC BLOOD PRESSURE: 90 MMHG | HEIGHT: 63 IN | SYSTOLIC BLOOD PRESSURE: 128 MMHG | RESPIRATION RATE: 16 BRPM | OXYGEN SATURATION: 99 % | HEART RATE: 69 BPM

## 2025-08-07 DIAGNOSIS — Z12.31 ENCOUNTER FOR SCREENING MAMMOGRAM FOR BREAST CANCER: ICD-10-CM

## 2025-08-07 DIAGNOSIS — F33.0 MILD EPISODE OF RECURRENT MAJOR DEPRESSIVE DISORDER: Primary | ICD-10-CM

## 2025-08-07 RX ORDER — SERTRALINE HYDROCHLORIDE 100 MG/1
100 TABLET, FILM COATED ORAL DAILY
Qty: 90 TABLET | Refills: 3 | Status: SHIPPED | OUTPATIENT
Start: 2025-08-07

## 2025-08-07 ASSESSMENT — ANXIETY QUESTIONNAIRES
7. FEELING AFRAID AS IF SOMETHING AWFUL MIGHT HAPPEN: NOT AT ALL
IF YOU CHECKED OFF ANY PROBLEMS ON THIS QUESTIONNAIRE, HOW DIFFICULT HAVE THESE PROBLEMS MADE IT FOR YOU TO DO YOUR WORK, TAKE CARE OF THINGS AT HOME, OR GET ALONG WITH OTHER PEOPLE: NOT DIFFICULT AT ALL
4. TROUBLE RELAXING: SEVERAL DAYS
5. BEING SO RESTLESS THAT IT IS HARD TO SIT STILL: SEVERAL DAYS
GAD7 TOTAL SCORE: 2
GAD7 TOTAL SCORE: 2
8. IF YOU CHECKED OFF ANY PROBLEMS, HOW DIFFICULT HAVE THESE MADE IT FOR YOU TO DO YOUR WORK, TAKE CARE OF THINGS AT HOME, OR GET ALONG WITH OTHER PEOPLE?: NOT DIFFICULT AT ALL
7. FEELING AFRAID AS IF SOMETHING AWFUL MIGHT HAPPEN: NOT AT ALL
1. FEELING NERVOUS, ANXIOUS, OR ON EDGE: NOT AT ALL
6. BECOMING EASILY ANNOYED OR IRRITABLE: NOT AT ALL
3. WORRYING TOO MUCH ABOUT DIFFERENT THINGS: NOT AT ALL
2. NOT BEING ABLE TO STOP OR CONTROL WORRYING: NOT AT ALL
GAD7 TOTAL SCORE: 2

## 2025-08-07 ASSESSMENT — PATIENT HEALTH QUESTIONNAIRE - PHQ9
SUM OF ALL RESPONSES TO PHQ QUESTIONS 1-9: 0
SUM OF ALL RESPONSES TO PHQ QUESTIONS 1-9: 0
10. IF YOU CHECKED OFF ANY PROBLEMS, HOW DIFFICULT HAVE THESE PROBLEMS MADE IT FOR YOU TO DO YOUR WORK, TAKE CARE OF THINGS AT HOME, OR GET ALONG WITH OTHER PEOPLE: NOT DIFFICULT AT ALL

## 2025-08-07 ASSESSMENT — PAIN SCALES - GENERAL: PAINLEVEL_OUTOF10: NO PAIN (0)

## (undated) DEVICE — PACK-BASIN SET-UP

## (undated) DEVICE — SOL NACL 0.9% IRRIG 1000ML BOTTLE 2F7124

## (undated) DEVICE — CANISTER-SUCTION 2000CC

## (undated) DEVICE — CLIP APPLIER 11" MED LIGACLIP MCM20

## (undated) DEVICE — GLV-8.5 BIOGEL LATEX

## (undated) DEVICE — GLOVE 8.5 PROTEXIS PI CLSC PF BD CUF STRL LF 12IN 2D72PL85X

## (undated) DEVICE — CATH TRAY-16FR METER W/STATLOCK LATEX]

## (undated) DEVICE — PACK BASIN SET UP SUTCNBSBBA

## (undated) DEVICE — DRSG KERLIX SUPER SPONGE 6X6.75" 2585

## (undated) DEVICE — PACK LAPAROTOMY CUSTOM SBA32LPMBG

## (undated) DEVICE — COVER LT HANDLE 2/PK 5160-2FG

## (undated) DEVICE — LABEL-STERILE PREPRINTED FOR OR

## (undated) DEVICE — LIGHT HANDLE COVER FOR SKYTRON LIGHTS

## (undated) DEVICE — LIGASURE-IMPACT SEALER/DIVIDER

## (undated) DEVICE — SCD SLEEVE-KNEE REG.

## (undated) DEVICE — FLUID TRAP FOR VIROSAFE FILTERS

## (undated) DEVICE — DRSG-SPONGE STERILE 8 X 4

## (undated) DEVICE — SLEEVE SCD EXPRESS KNEE LENGTH MED 9529

## (undated) DEVICE — STPL SKIN 35W 6.9MM  PXW35

## (undated) DEVICE — IRRIGATION-NACL 1000ML

## (undated) DEVICE — CANISTER SUCTION MEDI-VAC GUARDIAN 2000ML 90D 65651-220

## (undated) DEVICE — SUTURE-CHROMIC GUT 1 CTX 905H

## (undated) DEVICE — APPLICATOR-CHLORAPREP 26ML TINTED CHG 2%+ 70% IPA-SURGICAL

## (undated) DEVICE — TAPE-MEDIPORE 4" X 2YD

## (undated) DEVICE — IRRIGATION-H2O 1000ML

## (undated) DEVICE — SYRINGE-ASEPTO IRRIGATION

## (undated) DEVICE — LABEL STERILE PREPRINTED FOR OR FRRH01-2M

## (undated) DEVICE — DRSG-MEPILEX BORDER AG 4" X 8" (10CM X 20CM)

## (undated) DEVICE — SOL WATER IRRIG 1000ML BOTTLE 2F7114

## (undated) DEVICE — DRSG STERI STRIP 1/2X4" R1547

## (undated) DEVICE — Device

## (undated) DEVICE — PACK-C-SECTION-CUSTOM

## (undated) DEVICE — CAUTERY PAD-POLYHESIVE II ADULT

## (undated) DEVICE — TRAY SKIN PREP POVIDONE/IODINE DYND70372

## (undated) DEVICE — PREP CHLORAPREP 26ML TINTED HI-LITE ORANGE 930815

## (undated) DEVICE — SUTURE-VICRYL 0 CT J358H

## (undated) DEVICE — ESU GROUND PAD ADULT W/CORD E7507

## (undated) DEVICE — SUTURE-PDS II 0 CTX Z370T

## (undated) DEVICE — APPLICATOR BNZN TNCT RYN SWBSTK NWVN SNGL APLS1106

## (undated) DEVICE — GOWN-SURG XL LVL 3 REINFORCED

## (undated) RX ORDER — PROPOFOL 10 MG/ML
INJECTION, EMULSION INTRAVENOUS
Status: DISPENSED
Start: 2023-08-24

## (undated) RX ORDER — OXYTOCIN/0.9 % SODIUM CHLORIDE 30/500 ML
PLASTIC BAG, INJECTION (ML) INTRAVENOUS
Status: DISPENSED
Start: 2021-07-30

## (undated) RX ORDER — HYDROMORPHONE HYDROCHLORIDE 2 MG/ML
INJECTION, SOLUTION INTRAMUSCULAR; INTRAVENOUS; SUBCUTANEOUS
Status: DISPENSED
Start: 2021-07-30

## (undated) RX ORDER — KETOROLAC TROMETHAMINE 30 MG/ML
INJECTION, SOLUTION INTRAMUSCULAR; INTRAVENOUS
Status: DISPENSED
Start: 2023-08-24

## (undated) RX ORDER — DEXAMETHASONE SODIUM PHOSPHATE 10 MG/ML
INJECTION, SOLUTION INTRAMUSCULAR; INTRAVENOUS
Status: DISPENSED
Start: 2021-07-30

## (undated) RX ORDER — ONDANSETRON 2 MG/ML
INJECTION INTRAMUSCULAR; INTRAVENOUS
Status: DISPENSED
Start: 2021-07-30

## (undated) RX ORDER — FENTANYL CITRATE 50 UG/ML
INJECTION, SOLUTION INTRAMUSCULAR; INTRAVENOUS
Status: DISPENSED
Start: 2023-08-24

## (undated) RX ORDER — KETOROLAC TROMETHAMINE 30 MG/ML
INJECTION, SOLUTION INTRAMUSCULAR; INTRAVENOUS
Status: DISPENSED
Start: 2021-07-30

## (undated) RX ORDER — MISOPROSTOL 200 UG/1
TABLET ORAL
Status: DISPENSED
Start: 2021-07-30

## (undated) RX ORDER — FENTANYL CITRATE 50 UG/ML
INJECTION, SOLUTION INTRAMUSCULAR; INTRAVENOUS
Status: DISPENSED
Start: 2021-07-30

## (undated) RX ORDER — ONDANSETRON 2 MG/ML
INJECTION INTRAMUSCULAR; INTRAVENOUS
Status: DISPENSED
Start: 2023-08-24

## (undated) RX ORDER — FENTANYL CITRATE-0.9 % NACL/PF 10 MCG/ML
PLASTIC BAG, INJECTION (ML) INTRAVENOUS
Status: DISPENSED
Start: 2021-07-30

## (undated) RX ORDER — DEXAMETHASONE SODIUM PHOSPHATE 10 MG/ML
INJECTION, SOLUTION INTRAMUSCULAR; INTRAVENOUS
Status: DISPENSED
Start: 2023-08-24

## (undated) RX ORDER — EPHEDRINE SULFATE 50 MG/ML
INJECTION, SOLUTION INTRAVENOUS
Status: DISPENSED
Start: 2021-07-30

## (undated) RX ORDER — KETAMINE HCL IN NACL, ISO-OSM 100MG/10ML
SYRINGE (ML) INJECTION
Status: DISPENSED
Start: 2021-07-30